# Patient Record
Sex: MALE | Race: WHITE | NOT HISPANIC OR LATINO | Employment: FULL TIME | URBAN - METROPOLITAN AREA
[De-identification: names, ages, dates, MRNs, and addresses within clinical notes are randomized per-mention and may not be internally consistent; named-entity substitution may affect disease eponyms.]

---

## 2023-09-13 ENCOUNTER — HOSPITAL ENCOUNTER (INPATIENT)
Dept: DATA CONVERSION | Facility: HOSPITAL | Age: 23
Discharge: HOME HEALTH | End: 2023-09-18
Attending: UROLOGY | Admitting: UROLOGY
Payer: COMMERCIAL

## 2023-09-13 DIAGNOSIS — D62 ACUTE POSTHEMORRHAGIC ANEMIA: ICD-10-CM

## 2023-09-13 DIAGNOSIS — J45.909 UNSPECIFIED ASTHMA, UNCOMPLICATED (HHS-HCC): ICD-10-CM

## 2023-09-13 DIAGNOSIS — T81.31XA DISRUPTION OF EXTERNAL OPERATION (SURGICAL) WOUND, NOT ELSEWHERE CLASSIFIED, INITIAL ENCOUNTER: ICD-10-CM

## 2023-09-13 DIAGNOSIS — F64.9 GENDER IDENTITY DISORDER, UNSPECIFIED: ICD-10-CM

## 2023-09-13 DIAGNOSIS — F64.0 TRANSSEXUALISM: ICD-10-CM

## 2023-09-14 LAB
ALBUMIN (G/DL) IN SER/PLAS: 3.6 G/DL (ref 3.4–5)
ANION GAP IN SER/PLAS: 12 MMOL/L (ref 10–20)
CALCIUM (MG/DL) IN SER/PLAS: 8.7 MG/DL (ref 8.6–10.6)
CARBON DIOXIDE, TOTAL (MMOL/L) IN SER/PLAS: 24 MMOL/L (ref 21–32)
CHLORIDE (MMOL/L) IN SER/PLAS: 105 MMOL/L (ref 98–107)
CREATININE (MG/DL) IN SER/PLAS: 0.74 MG/DL (ref 0.5–1.3)
ERYTHROCYTE DISTRIBUTION WIDTH (RATIO) BY AUTOMATED COUNT: 13.2 % (ref 11.5–14.5)
ERYTHROCYTE MEAN CORPUSCULAR HEMOGLOBIN CONCENTRATION (G/DL) BY AUTOMATED: 32.9 G/DL (ref 32–36)
ERYTHROCYTE MEAN CORPUSCULAR VOLUME (FL) BY AUTOMATED COUNT: 90 FL (ref 80–100)
ERYTHROCYTES (10*6/UL) IN BLOOD BY AUTOMATED COUNT: 4.17 X10E12/L (ref 4.5–5.9)
GFR MALE: >90 ML/MIN/1.73M2
GLUCOSE (MG/DL) IN SER/PLAS: 75 MG/DL (ref 74–99)
HEMATOCRIT (%) IN BLOOD BY AUTOMATED COUNT: 37.4 % (ref 41–52)
HEMOGLOBIN (G/DL) IN BLOOD: 12.3 G/DL (ref 13.5–17.5)
LEUKOCYTES (10*3/UL) IN BLOOD BY AUTOMATED COUNT: 12.7 X10E9/L (ref 4.4–11.3)
NRBC (PER 100 WBCS) BY AUTOMATED COUNT: 0 /100 WBC (ref 0–0)
PHOSPHATE (MG/DL) IN SER/PLAS: 3.8 MG/DL (ref 2.5–4.9)
PLATELETS (10*3/UL) IN BLOOD AUTOMATED COUNT: 196 X10E9/L (ref 150–450)
POTASSIUM (MMOL/L) IN SER/PLAS: 4.2 MMOL/L (ref 3.5–5.3)
SODIUM (MMOL/L) IN SER/PLAS: 137 MMOL/L (ref 136–145)
UREA NITROGEN (MG/DL) IN SER/PLAS: 11 MG/DL (ref 6–23)

## 2023-09-15 LAB
ALBUMIN (G/DL) IN SER/PLAS: 3.5 G/DL (ref 3.4–5)
ANION GAP IN SER/PLAS: 11 MMOL/L (ref 10–20)
CALCIUM (MG/DL) IN SER/PLAS: 8.6 MG/DL (ref 8.6–10.6)
CARBON DIOXIDE, TOTAL (MMOL/L) IN SER/PLAS: 27 MMOL/L (ref 21–32)
CHLORIDE (MMOL/L) IN SER/PLAS: 103 MMOL/L (ref 98–107)
CREATININE (MG/DL) IN SER/PLAS: 0.96 MG/DL (ref 0.5–1.3)
ERYTHROCYTE DISTRIBUTION WIDTH (RATIO) BY AUTOMATED COUNT: 13.1 % (ref 11.5–14.5)
ERYTHROCYTE MEAN CORPUSCULAR HEMOGLOBIN CONCENTRATION (G/DL) BY AUTOMATED: 32.3 G/DL (ref 32–36)
ERYTHROCYTE MEAN CORPUSCULAR VOLUME (FL) BY AUTOMATED COUNT: 93 FL (ref 80–100)
ERYTHROCYTES (10*6/UL) IN BLOOD BY AUTOMATED COUNT: 3.89 X10E12/L (ref 4.5–5.9)
GFR MALE: >90 ML/MIN/1.73M2
GLUCOSE (MG/DL) IN SER/PLAS: 83 MG/DL (ref 74–99)
HEMATOCRIT (%) IN BLOOD BY AUTOMATED COUNT: 36.2 % (ref 41–52)
HEMOGLOBIN (G/DL) IN BLOOD: 11.7 G/DL (ref 13.5–17.5)
LEUKOCYTES (10*3/UL) IN BLOOD BY AUTOMATED COUNT: 9.6 X10E9/L (ref 4.4–11.3)
NRBC (PER 100 WBCS) BY AUTOMATED COUNT: 0 /100 WBC (ref 0–0)
PHOSPHATE (MG/DL) IN SER/PLAS: 3.3 MG/DL (ref 2.5–4.9)
PLATELETS (10*3/UL) IN BLOOD AUTOMATED COUNT: 175 X10E9/L (ref 150–450)
POTASSIUM (MMOL/L) IN SER/PLAS: 4.2 MMOL/L (ref 3.5–5.3)
SODIUM (MMOL/L) IN SER/PLAS: 137 MMOL/L (ref 136–145)
UREA NITROGEN (MG/DL) IN SER/PLAS: 10 MG/DL (ref 6–23)

## 2023-09-16 LAB
ALBUMIN (G/DL) IN SER/PLAS: 3.4 G/DL (ref 3.4–5)
ANION GAP IN SER/PLAS: 12 MMOL/L (ref 10–20)
CALCIUM (MG/DL) IN SER/PLAS: 8.5 MG/DL (ref 8.6–10.6)
CARBON DIOXIDE, TOTAL (MMOL/L) IN SER/PLAS: 25 MMOL/L (ref 21–32)
CHLORIDE (MMOL/L) IN SER/PLAS: 103 MMOL/L (ref 98–107)
CREATININE (MG/DL) IN SER/PLAS: 0.75 MG/DL (ref 0.5–1.3)
ERYTHROCYTE DISTRIBUTION WIDTH (RATIO) BY AUTOMATED COUNT: 13.1 % (ref 11.5–14.5)
ERYTHROCYTE MEAN CORPUSCULAR HEMOGLOBIN CONCENTRATION (G/DL) BY AUTOMATED: 32.9 G/DL (ref 32–36)
ERYTHROCYTE MEAN CORPUSCULAR VOLUME (FL) BY AUTOMATED COUNT: 90 FL (ref 80–100)
ERYTHROCYTES (10*6/UL) IN BLOOD BY AUTOMATED COUNT: 4.04 X10E12/L (ref 4.5–5.9)
GFR MALE: >90 ML/MIN/1.73M2
GLUCOSE (MG/DL) IN SER/PLAS: 85 MG/DL (ref 74–99)
HEMATOCRIT (%) IN BLOOD BY AUTOMATED COUNT: 36.5 % (ref 41–52)
HEMOGLOBIN (G/DL) IN BLOOD: 12 G/DL (ref 13.5–17.5)
LEUKOCYTES (10*3/UL) IN BLOOD BY AUTOMATED COUNT: 8.6 X10E9/L (ref 4.4–11.3)
NRBC (PER 100 WBCS) BY AUTOMATED COUNT: 0 /100 WBC (ref 0–0)
PHOSPHATE (MG/DL) IN SER/PLAS: 2.8 MG/DL (ref 2.5–4.9)
PLATELETS (10*3/UL) IN BLOOD AUTOMATED COUNT: 180 X10E9/L (ref 150–450)
POTASSIUM (MMOL/L) IN SER/PLAS: 4.3 MMOL/L (ref 3.5–5.3)
SODIUM (MMOL/L) IN SER/PLAS: 136 MMOL/L (ref 136–145)
UREA NITROGEN (MG/DL) IN SER/PLAS: 10 MG/DL (ref 6–23)

## 2023-09-30 NOTE — PROGRESS NOTES
Service: Urology     Subjective Data:   ELLI REDMOND is a 23 year old Male who is Hospital Day # 5 and POD #4 for 1. ALT Phalloplasty Delayed;;2. Split Thickness Skin Graft Nerve Transfer/VAC;     NAEON. Penrose accidentally removed during dressing change yesterday. Sat in chair for 1hr yesterday.    Overnight Events: Patient had an uneventful night.     Objective Data:     Objective Information:        T   P  R  BP   MAP  SpO2   Value  37.1  67  16  115/71      97%  Date/Time 9/17 8:20 9/17 8:20 9/17 8:20 9/17 8:20    9/17 8:20  Range  (36.8C - 37.2C )  (61 - 73 )  (16 - 18 )  (107 - 120 )/ (66 - 76 )    (96% - 99% )  Highest temp of 37.2 C was recorded at 9/16 18:01         Intake                                   Output      Enteral - Oral         240 mL               Urine                  2400 mL   IV Fluids              300 mL    Physical Exam Narrative:  ·  Physical Exam:    General: Laying in bed. NAD.   Eyes: EOMI  ENMT: no apparent injury, no lesions seen, MMM  Head/neck: NCAT  Cardiac: regular rate in chart  Pulm: normal respiratory effort  GI: soft, NT/ND, no masses palpated  : Indwelling urethral catheter draining clear yellow urine. neophallus warm and without discoloration. ventral side covered in integra and xeroform  Msk: BOURNE  Extremities: normal extremities  Skin: warm, dry, no lesions noted  Neuro: AOx3  Psych: appropriate mood and behavior      Recent Lab Results:    Results:    CBC: 9/16/2023 06:39              \     Hgb     /                              \     12.0 L    /  WBC  ----------------  Plt               8.6       ----------------    180              /     Hct     \                              /     36.5 L    \            RBC: 4.04 L    MCV: 90           RFP: 9/16/2023 06:39  NA+        Cl-     BUN  /                         136    103    10  /  --------------------------------  Glucose                ---------------------------  85    K+     HCO3-   Creat \                          4.3    25    0.75  \  Calcium : 8.5 LAnion Gap : 12          Albumin : 3.4     Phos : 2.8      Assessment and Plan:   Daily Risk Screen:  ·  Does patient have an indwelling urinary catheter? yes   ·  Plan for indwelling urinary catheter removal today? yes   ·  Does patient have a central line? n/a consulting service     Code Status:  ·  Code Status Full Code     Assessment:    Beck Galdamez is a 23 year old Male who is presenting for a ALT phalloplasty w/left thigh flap with Inez Castillo and Francine (9/13). POst op course uncomplicated at this  time.      Plan:  - Reg diet  - pain control  - HLIV, no labs  - off bedrest  - IS 10x/hr  - Ancef  - SQH, SCDs  - perez in place  - appreciate co management by plastics    Seen and discussed with Chief resident Dr. Bourgeois.    Filipe Triana MD  Urology Resident (PGY-4)  Urology Adult [id number]  Urology Pediatric [id number]                 Attestation:   Note Completion:  I am a:  Resident/Fellow   Attending Attestation I reviewed the resident/fellow?s documentation and discussed the patient with the resident/fellow.  I agree with the resident/fellow?s medical  decision making as documented in the note.          Electronic Signatures:  Filipe Triana (Resident))  (Signed 17-Sep-2023 09:17)   Authored: Service, Subjective Data, Objective Data, Assessment  and Plan, Note Completion  Bladimir Castillo)  (Signed 19-Sep-2023 07:52)   Authored: Note Completion   Co-Signer: Service, Subjective Data, Objective Data, Assessment and Plan, Note Completion      Last Updated: 19-Sep-2023 07:52 by Bladimir Castillo)

## 2023-09-30 NOTE — PROGRESS NOTES
"        Service: Plastic Surgery     Subjective Data:   ELLI REDMOND is a 23 year old Male who is Hospital Day # 5 and POD #4 for 1. ALT Phalloplasty Delayed;;2. Split Thickness Skin Graft Nerve Transfer/VAC;     Pt sitting in bedside chair. Endorses nausea this AM relating to constipation and possible reflux from having to eat meals in bed while on bedrest. Concern overnight for urinary discomfort and feeling  of having to urinate but not being able to despite Perez catheter insertion. Urology has been paged to assess Perez. Patient has been able to get OOB without much issue but does not \"muscle pain\" to the LLE with dangling and extension. No fevers, chills,  SOB, CP.    Overnight Events: Patient had an uneventful night.     Objective Data:     Objective Information:      T   P  R  BP   MAP  SpO2   Value  37.1  67  16  115/71      97%  Date/Time 9/17 8:20 9/17 8:20 9/17 8:20 9/17 8:20    9/17 8:20  Range  (36.8C - 37.2C )  (61 - 73 )  (16 - 18 )  (107 - 120 )/ (66 - 76 )    (96% - 99% )  Highest temp of 37.2 C was recorded at 9/16 18:01      Pain reported at 9/16 8:04: 2 = Mild    ---- Intake and Output  -----  Mn/Dy/Year Time  Intake   Output  Net  Sep 17, 2023 6:00 am  0   450  -450  Sep 16, 2023 10:00 pm  0   650  -650  Sep 16, 2023 2:00 pm  540   1300  -760    The Intake and Output Totals for the last 24 hours are:      Intake   Output  Net      540   2400  -1860    Physical Exam by System:    Constitutional: A&Ox3, NAD, calm and cooperative.   Eyes: PERRL, EOMI, clear sclera.   ENMT: MMM.   Head/Neck: Normocephalic, atraumatic.   Respiratory/Thorax: Respirations even and unlabored.   Cardiovascular: RRR.   Gastrointestinal: Soft, non-tender, non-distended.   Genitourinary: Voiding per perez catheter in native  urethra with clear, yellow urine. Phallus in appropriate position, maintaining 45 degree angle with mesh panties/kerlix fluff for support. Phallus warm, soft and with appropriate coloring. Slightly " less edematous. Strong arterial doppler signals on R  lateral mid phallus and dorsal aspect of distal phallus. Suture line at base of phallus intact. Integra intact to the volar aspect of the phallus. S/p self removal of Penrose drain, scant bloody drainage to dressing at previous insertion site.   Extremities: LLE in ACE wrap with underlying wound  vac in place without alarm for leak or malfunction, holding appropriate suction at -125mmHg. RLE STSG donor site with overlying xeroform intact.   Neurological: Alert and oriented x3.   Psychological: Appropriate mood and behavior.   Skin: Warm and dry, no lesions, no rashes.     Medication:    Medications:          Continuous Medications       --------------------------------  No continuous medications are active       Scheduled Medications       --------------------------------    1. Acetaminophen:  650  mg  Oral  Every 6 Hours    2. Aspirin Chewable:  81  mg  Oral  Daily    3. ceFAZolin 2 gram/ D5W 100 mL Premix IVPB:  100  mL  IntraVenous Piggyback  Every 8 Hours    4. Docusate:  100  mg  Oral  2 Times a Day    5. Heparin SubCutaneous:  5000  unit(s)  SubCutaneous  Once    6. Heparin SubCutaneous:  5000  unit(s)  SubCutaneous  Every 8 Hours    7. Lidocaine 4% TransDermal:  2  patch  TransDermal  Every 24 Hours    8. Methocarbamol:  500  mg  Oral  4 Times a Day         PRN Medications       --------------------------------    1. Bisacodyl Enteric Coated:  5  mg  Oral  Once    2. HYDROmorphone Injectable:  0.2  mg  IntraVenous Push  Every 2 Hours    3. Ondansetron Injectable:  4  mg  IntraVenous Push  Every 6 Hours    4. oxyCODONE Immediate Release:  5  mg  Oral  Every 4 Hours    5. oxyCODONE Immediate Release:  10  mg  Oral  Every 4 Hours    6. Sodium Chloride 0.9% Injectable Flush:  10  mL  IntraVenous Flush  Every 8 Hours and as Needed        Assessment and Plan:   Daily Risk Screen:  ·  Does patient have an indwelling urinary catheter? yes   ·  Plan for indwelling  urinary catheter removal today? no   ·  The patient continues to require indwelling urinary catheterization for perioperative use for selected surgical procedures     Code Status:  ·  Code Status Full Code     Assessment:    ELLI REDMOND is a 23 year old male who is s/p delayed left ALT phalloplasty from left donor site, split thickness skin graft (right thigh donor site) Integra placement  and vac placement to left thigh ALT site on 9/13 with Urology and Plastic Surgery.      Plan/Recommendations:   S/p delayed left ALT phalloplasty and STSG   - Continue Q4hour phallus per nursing       · Assess temperature, turgor, color, incisions, positioning plus Doppler pulses along phallus shaft       · Please alert plastic surgery immediately with any acute changes or concerns including but not limited to pallor, coolness to touch, decreased or absent doppler pulse  - Maintain phallus at 45 degree angle with Kerlix fluffs   - Ok for warm compress to neophallus       · Ensure a barrier between neophallus and warm compress   - Maintain wound vac to LLE STSG site at -125mmHg low continuous suction       · Plan for removal of wound vac on POD5 (9/18) per plastic surgery APPs       · Monitor and record wound vac output      · Keep wound vac plugged in at all times while patient is resting in bed      · Notify plastic surgery DIONTE immediately with concerns of wound vac leak or malfunction  - Leave STSG donor site WOODY (s/p outer dressing removal POD#2)       · Leave residual Xeroform intact, dressing will dry out and self remove      · Okay to trim edges of Xeroform as it self removes   - OT consult for phallus support (to be completed Monday 9/18)   - Okay to continue regular diet  - Okay to be OOB from plastic surgery perspective       · Ensure neophallus is always supported and maintains postioning at 45degrees   - Crowe catheter per Urology  - Remainder of care per primary service   - Plastic surgery will continue to follow in  post operative period   - Follow up appointment with plastic surgery scheduled for 9/21     Patient and plan discussed with Dr. Francine Roque PA-C   Plastic and Reconstructive Surgery   Available via [email or web address], pager: [phone] or Team phones: [phone]     Time spent on the assessment of patient, gathering and interpreting data, review of medical record/patient history, personally reviewing radiographic imaging and formulation of this note 30 minutes. With greater than 50% spent in personal discussion with  patient.       Electronic Signatures:  Christine Roque (PAC)  (Signed 17-Sep-2023 09:56)   Authored: Service, Subjective Data, Objective Data, Assessment  and Plan, Note Completion      Last Updated: 17-Sep-2023 09:56 by Christine Roque (PAC)

## 2023-09-30 NOTE — H&P
History & Physical Reviewed:   I have reviewed the History and Physical dated:  13-Sep-2023   History and Physical reviewed and relevant findings noted. Patient examined to review pertinent physical  findings.: No significant changes   Home Medications Reviewed: no changes noted   Allergies Reviewed: no changes noted       ERAS (Enhanced Recovery After Surgery):  ·  ERAS Patient: no     Consent:   COVID-19 Consent:  ·  COVID-19 Risk Consent Surgeon has reviewed key risks related to the risk of eve COVID-19 and if they contract COVID-19 what the risks are.     Attestation:   Note Completion:  I am a:  Resident/Fellow   Attending Attestation I saw and evaluated the patient.  I personally obtained the key and critical portions of the history and physical exam or was physically present for key and  critical portions performed by the resident/fellow. I reviewed the resident/fellow?s documentation and discussed the patient with the resident/fellow.  I agree with the resident/fellow?s medical decision making as documented in the note.     I personally evaluated the patient on 13-Sep-2023         Electronic Signatures:  Juan Carlos Martinez (Resident))  (Signed 13-Sep-2023 05:02)   Authored: History & Physical Reviewed, ERAS, Consent,  Note Completion  Bladimir Castillo)  (Signed 13-Sep-2023 08:28)   Authored: Note Completion   Co-Signer: History & Physical Reviewed, ERAS, Consent, Note Completion      Last Updated: 13-Sep-2023 08:28 by Bladimir Castillo)

## 2023-09-30 NOTE — PROGRESS NOTES
Service: Urology     Subjective Data:   ELLI REDMOND is a 23 year old Male who is Hospital Day # 4 and POD #3 for 1. ALT Phalloplasty Delayed;;2. Split Thickness Skin Graft Nerve Transfer/VAC;     Patient doing well overnight.    Overnight Events: Patient had an uneventful night.     Objective Data:     Objective Information:      T   P  R  BP   MAP  SpO2   Value  36.8  73  16  120/76      96%  Date/Time 9/16 5:47 9/16 5:47 9/16 5:47 9/16 5:47    9/16 5:47  Range  (36.5C - 37C )  (72 - 91 )  (16 - 16 )  (107 - 120 )/ (61 - 76 )    (95% - 98% )  Highest temp of 37 C was recorded at 9/15 21:56      Pain reported at 9/15 21:40: 5 = Moderate    ---- Intake and Output  -----  Mn/Dy/Year Time  Intake   Output  Net  Sep 16, 2023 6:00 am  0   450  -450  Sep 15, 2023 10:00 pm  540   1400  -860  Sep 15, 2023 2:00 pm  960   1650  -690    The Intake and Output Totals for the last 24 hours are:      Intake   Output  Net      1500   3500  -2000    Physical Exam Narrative:  ·  Physical Exam:    General: Laying in bed. NAD.   Eyes: EOMI  ENMT: no apparent injury, no lesions seen, MMM  Head/neck: NCAT  Cardiac: regular rate in chart  Pulm: normal respiratory effort  GI: soft, NT/ND, no masses palpated  : Indwelling urethral catheter draining clear yellow urine. neophallus warm and without discoloration. ventral side covered in integra and xeroform  Msk: BOURNE  Extremities: normal extremities  Skin: warm, dry, no lesions noted  Neuro: AOx3  Psych: appropriate mood and behavior      Medication:    Medications:          Continuous Medications       --------------------------------    1. Sodium Chloride 0.9% Infusion:  1000  mL  IntraVenous  <Continuous>         Scheduled Medications       --------------------------------    1. Acetaminophen:  650  mg  Oral  Every 6 Hours    2. Aspirin Chewable:  81  mg  Oral  Daily    3. ceFAZolin 2 gram/ D5W 100 mL Premix IVPB:  100  mL  IntraVenous Piggyback  Every 8 Hours    4. Docusate:   100  mg  Oral  2 Times a Day    5. Heparin SubCutaneous:  5000  unit(s)  SubCutaneous  Once    6. Heparin SubCutaneous:  5000  unit(s)  SubCutaneous  Every 8 Hours    7. Lidocaine 4% TransDermal:  2  patch  TransDermal  Every 24 Hours    8. Methocarbamol:  500  mg  Oral  4 Times a Day         PRN Medications       --------------------------------    1. Bisacodyl Enteric Coated:  5  mg  Oral  Once    2. HYDROmorphone Injectable:  0.2  mg  IntraVenous Push  Every 2 Hours    3. Ondansetron Injectable:  4  mg  IntraVenous Push  Every 6 Hours    4. oxyCODONE Immediate Release:  5  mg  Oral  Every 4 Hours    5. oxyCODONE Immediate Release:  10  mg  Oral  Every 4 Hours    6. Sodium Chloride 0.9% Injectable Flush:  10  mL  IntraVenous Flush  Every 8 Hours and as Needed        Recent Lab Results:    Results:    CBC: 9/16/2023 06:39              \     Hgb     /                              \     12.0 L    /  WBC  ----------------  Plt               8.6       ----------------    180              /     Hct     \                              /     36.5 L    \            RBC: 4.04 L    MCV: 90           RFP: 9/16/2023 06:39  NA+        Cl-     BUN  /                         136    103    10  /  --------------------------------  Glucose                ---------------------------  85    K+     HCO3-   Creat \                         4.3    25    0.75  \  Calcium : 8.5 LAnion Gap : 12          Albumin : 3.4     Phos : 2.8      Assessment and Plan:   Daily Risk Screen:  ·  Does patient have an indwelling urinary catheter? yes   ·  Plan for indwelling urinary catheter removal today? yes   ·  Does patient have a central line? n/a consulting service     Code Status:  ·  Code Status Full Code     Assessment:    Beck Galdamez is a 23 year old Male who is presenting for a ALT phalloplasty w/left thigh flap with Inez Castillo and Francine yesterday.     Plan:  - Reg diet  - pain control  - HLIV, no labs  - off bedrest  - IS 10x/hr  -  Ancef  - SQH, SCDs  - perez in place  - appreciate co management by plastics    Seen and discussed with Chief resident Dr. Bourgeois.    Agustín Ward MD   Urology - PGY3  Pager [phone]                 Attestation:   Note Completion:  I am a:  Resident/Fellow   Attending Attestation I reviewed the resident/fellow?s documentation and discussed the patient with the resident/fellow.  I agree with the resident/fellow?s medical  decision making as documented in the note.          Electronic Signatures:  Agustín Wrad (Resident))  (Signed 16-Sep-2023 10:25)   Authored: Service, Subjective Data, Objective Data, Assessment  and Plan, Note Completion  Bladimir Castillo)  (Signed 19-Sep-2023 07:52)   Authored: Note Completion   Co-Signer: Service, Subjective Data, Objective Data, Assessment and Plan, Note Completion      Last Updated: 19-Sep-2023 07:52 by Bladimir Castillo)

## 2023-09-30 NOTE — PROGRESS NOTES
Service: Plastic Surgery     Subjective Data:   ELLI REDMOND is a 23 year old Male who is Hospital Day # 4 and POD #3 for 1. ALT Phalloplasty Delayed;;2. Split Thickness Skin Graft Nerve Transfer/VAC;     Pt resting comfortably in bed. Doing well this AM. No acute concerns but is slightly anxious about being OOB for first time. Pain well controlled. Tolerating diet with no N/V. No fevers, chills, SOB,  CP.    Overnight Events: Patient had an uneventful night.     Objective Data:     Objective Information:      T   P  R  BP   MAP  SpO2   Value  36.8  73  16  120/76      96%  Date/Time 9/16 5:47 9/16 5:47 9/16 5:47 9/16 5:47    9/16 5:47  Range  (36.5C - 37C )  (72 - 91 )  (16 - 16 )  (107 - 120 )/ (61 - 76 )    (95% - 98% )  Highest temp of 37 C was recorded at 9/15 21:56      Pain reported at 9/15 21:40: 5 = Moderate    Physical Exam by System:    Constitutional: A&Ox3, NAD, calm and cooperative.   Eyes: PERRL, EOMI, clear sclera.   ENMT: MMM.   Head/Neck: Normocephalic, atraumatic.   Respiratory/Thorax: Respirations even and unlabored.   Cardiovascular: RRR.   Gastrointestinal: Soft, non-tender, non-distended.   Genitourinary: Voiding per perez catheter in native  urethra with clear, yellow urine. Phallus in appropriate position, maintaining 45 degree angle with mesh panties/kerlix fluff for support. Phallus warm, soft and with appropriate coloring. Strong arterial doppler signals on R lateral mid phallus and dorsal  aspect of distal phallus. Suture line at base of phallus intact. Integra intact to the volar aspect of the phallus. Bloody drainage from penrose drain.   Extremities: LLE in ACE wrap with underlying wound  vac in place without alarm for leak or malfunction, holding appropriate suction at -125mmHg. RLE STSG donor site with overlying xeroform intact.   Neurological: Alert and oriented x3.   Psychological: Appropriate mood and behavior.   Skin: Warm and dry, no lesions, no rashes.      Medication:    Medications:          Continuous Medications       --------------------------------  No continuous medications are active       Scheduled Medications       --------------------------------    1. Acetaminophen:  650  mg  Oral  Every 6 Hours    2. Aspirin Chewable:  81  mg  Oral  Daily    3. ceFAZolin 2 gram/ D5W 100 mL Premix IVPB:  100  mL  IntraVenous Piggyback  Every 8 Hours    4. Docusate:  100  mg  Oral  2 Times a Day    5. Heparin SubCutaneous:  5000  unit(s)  SubCutaneous  Once    6. Heparin SubCutaneous:  5000  unit(s)  SubCutaneous  Every 8 Hours    7. Lidocaine 4% TransDermal:  2  patch  TransDermal  Every 24 Hours    8. Methocarbamol:  500  mg  Oral  4 Times a Day         PRN Medications       --------------------------------    1. Bisacodyl Enteric Coated:  5  mg  Oral  Once    2. HYDROmorphone Injectable:  0.2  mg  IntraVenous Push  Every 2 Hours    3. Ondansetron Injectable:  4  mg  IntraVenous Push  Every 6 Hours    4. oxyCODONE Immediate Release:  5  mg  Oral  Every 4 Hours    5. oxyCODONE Immediate Release:  10  mg  Oral  Every 4 Hours    6. Sodium Chloride 0.9% Injectable Flush:  10  mL  IntraVenous Flush  Every 8 Hours and as Needed        Recent Lab Results:    Results:    CBC: 9/16/2023 06:39              \     Hgb     /                              \     12.0 L    /  WBC  ----------------  Plt               8.6       ----------------    180              /     Hct     \                              /     36.5 L    \            RBC: 4.04 L    MCV: 90           RFP: 9/16/2023 06:39  NA+        Cl-     BUN  /                         136    103    10  /  --------------------------------  Glucose                ---------------------------  85    K+     HCO3-   Creat \                         4.3    25    0.75  \  Calcium : 8.5 LAnion Gap : 12          Albumin : 3.4     Phos : 2.8      Assessment and Plan:   Daily Risk Screen:  ·  Does patient have an indwelling urinary  catheter? yes   ·  Plan for indwelling urinary catheter removal today? no   ·  The patient continues to require indwelling urinary catheterization for perioperative use for selected surgical procedures     Code Status:  ·  Code Status Full Code     Assessment:    ELLI REDMOND is a 23 year old male who is s/p delayed left ALT phalloplasty from left donor site, split thickness skin graft (right thigh donor site) Integra placement  and vac placement to left thigh ALT site on 9/13 with Urology and Plastic Surgery.      Plan/Recommendations:   S/p delayed left ALT phalloplasty and STSG   - Continue Q4hour phallus per nursing       · Assess temperature, turgor, color, incisions, positioning plus Doppler pulses along phallus shaft       · Please alert plastic surgery immediately with any acute changes or concerns including but not limited to pallor, coolness to touch, decreased or absent doppler pulse  - Maintain phallus at 45 degree angle with Kerlix fluffs   - Ok for warm compress to neophallus       · Ensure a barrier between neophallus and warm compress   - Maintain wound vac to LLE STSG site at -125mmHg low continuous suction       · Plan for removal of wound vac on POD5 (9/18) per plastic surgery APPs       · Monitor and record wound vac output      · Keep wound vac plugged in at all times while patient is resting in bed      · Notify plastic surgery DIONTE immediately with concerns of wound vac leak or malfunction  - Leave STSG donor site WOODY (s/p outer dressing removal POD#2)       · Leave residual Xeroform intact, dressing will dry out and self remove      · Okay to trim edges of Xeroform as it self removes   - OT consult for phallus support   - Okay to continue regular diet  - Okay to be OOB from plastic surgery perspective       · Ensure neophallus is always supported and maintains postioning at 45degrees   - Crowe and penrose drain per Urology  - Remainder of care per primary service   - Plastic surgery will  continue to follow in post operative period   - Follow up appointment with plastic surgery scheduled for 9/21     Patient and plan discussed with Dr. Francine Roque PA-C   Plastic and Reconstructive Surgery   Available via [employer], pager: [phone] or Team phones: [phone]     Time spent on the assessment of patient, gathering and interpreting data, review of medical record/patient history, personally reviewing radiographic imaging and formulation of this note 30 minutes. With greater than 50% spent in personal discussion with  patient.       Electronic Signatures:  Christine Roque (PAC)  (Signed 16-Sep-2023 10:50)   Authored: Service, Subjective Data, Objective Data, Assessment  and Plan, Note Completion      Last Updated: 16-Sep-2023 10:50 by Christine Roque (PAC)

## 2023-09-30 NOTE — PROGRESS NOTES
Service: Urology     Subjective Data:   ELLI REDMOND is a 23 year old Male who is Hospital Day # 2 and POD #1 for 1. ALT Phalloplasty Delayed;;2. Split Thickness Skin Graft Nerve Transfer/VAC;     9/14. Patient doing well during AM rounds. His vitals signs are stable. Denies any nausea/vomiting.    Overnight Events: Patient had an uneventful night.     Objective Data:     Objective Information:      T   P  R  BP   MAP  SpO2   Value  36.4  86  17  103/53      98%  Date/Time 9/14 6:00 9/14 6:00 9/14 6:00 9/14 6:00    9/14 6:00  Range  (36.4C - 37C )  (71 - 86 )  (17 - 18 )  (103 - 120 )/ (53 - 76 )    (96% - 98% )  Highest temp of 37 C was recorded at 9/13 21:27      Pain reported at 9/14 5:00: 4 = Moderate    ---- Intake and Output  -----  Mn/Dy/Year Time  Intake   Output  Net  Sep 14, 2023 6:00 am  600   1100  -500  Sep 13, 2023 10:00 pm  2625   1805  820    The Intake and Output Totals for the last 24 hours are:      Intake   Output  Net      3225   2905  320      ---- Intake and Output  -----  Mn/Dy/Year Time  Intake   Output  Net  Sep 14, 2023 6:00 am  600   1100  -500  Sep 13, 2023 10:00 pm  2625   1805  820    The Intake and Output Totals for the last 24 hours are:      Intake   Output  Net      3225   2905  320    Physical Exam Narrative:  ·  Physical Exam:    General: Laying in bed. NAD.   Eyes: EOMI  ENMT: no apparent injury, no lesions seen, MMM  Head/neck: NCAT  Cardiac: regular rate in chart  Pulm: normal respiratory effort  GI: soft, NT/ND, no masses palpated  : Indwelling urethral catheter draining clear yellow urine   Msk: BOURNE  Extremities: normal extremities  Skin: warm, dry, no lesions noted  Neuro: AOx3  Psych: appropriate mood and behavior      Medication:    Medications:          Continuous Medications       --------------------------------    1. Sodium Chloride 0.9% Infusion:  1000  mL  IntraVenous  <Continuous>         Scheduled Medications       --------------------------------    1.  Acetaminophen:  650  mg  Oral  Every 6 Hours    2. Aspirin Chewable:  81  mg  Oral  Daily    3. ceFAZolin 2 gram/ D5W 100 mL Premix IVPB:  100  mL  IntraVenous Piggyback  Every 8 Hours    4. Docusate:  100  mg  Oral  2 Times a Day    5. Heparin SubCutaneous:  5000  unit(s)  SubCutaneous  Once    6. Heparin SubCutaneous:  5000  unit(s)  SubCutaneous  Every 8 Hours    7. Lidocaine 4% TransDermal:  2  patch  TransDermal  Every 24 Hours    8. Methocarbamol:  500  mg  Oral  4 Times a Day         PRN Medications       --------------------------------    1. Bisacodyl Enteric Coated:  5  mg  Oral  Once    2. HYDROmorphone Injectable:  0.2  mg  IntraVenous Push  Every 2 Hours    3. Ondansetron Injectable:  4  mg  IntraVenous Push  Every 6 Hours    4. oxyCODONE Immediate Release:  5  mg  Oral  Every 4 Hours    5. oxyCODONE Immediate Release:  10  mg  Oral  Every 4 Hours    6. Sodium Chloride 0.9% Injectable Flush:  10  mL  IntraVenous Flush  Every 8 Hours and as Needed        Recent Lab Results:    Results:        I have reviewed these laboratory results:    Complete Blood Count  14-Sep-2023 05:52:00      Result Value    White Blood Cell Count  12.7   H   Nucleated Erythrocyte Count  0.0    Red Blood Cell Count  4.17   L   HGB  12.3   L   HCT  37.4   L   MCV  90    MCHC  32.9    PLT  196    RDW-CV  13.2        Assessment and Plan:   Daily Risk Screen:  ·  Does patient have an indwelling urinary catheter? yes   ·  Plan for indwelling urinary catheter removal today? yes    ·  Does patient have a central line? n/a consulting service      Code Status:  ·  Code Status Full Code     Assessment:    Beck Galdamez is a 23 year old Male who is presenting for a ALT phalloplasty w/left thigh flap with Inez Castillo and Francine yesterday.     PMHx: Asthma, Anxiety ADD/ADHD  PSHx: Mastectomy, Hysterectomy  SH: Occasional alcohol, marijuana gummies  Fam Hx: Diabetes, CA  Alllergies: NKDA      9/14. Patient reports no issues during am rounds. His  vitals signs are stable. Denies any nausea/vomiting.    Plan:  - Keep patient NPO  - Continue bedrest  - F/U AM labs  - VAC dressing care  - Q1 hr flap checks   - Continue on antibiotics  - Appreciate  plastics co-management the patient  - Scheduled Tylenol, PRN Oxycodone for pain, PRN Dilaudid for breakthrough pa  - Maintain MAP >65  - Continue to monitor BP                                                                                                                                                                    - Encourage IS  - Maintain O2 >88%  - Bowel regimen  - PRN Zofran for nausea  - Daily BMP  - Strict I&Os  - Replete lytes PRN  - Trend CBC daily   - Transfusion not indicated at this time       Plan is preliminary until note is finalized.    Seen and discussed with Chief resident Dr. Rene.    Deny Ward MD  Urology Resident    [id number]                  Electronic Signatures:  Bladimir Castillo)  (Signed 14-Sep-2023 12:42)   Authored: Assessment and Plan, Note Completion   Co-Signer: Service, Subjective Data, Objective Data, Assessment and Plan  Deny Ward (ASST)  (Signed 14-Sep-2023 08:50)   Authored: Service, Subjective Data, Objective Data, Assessment  and Plan      Last Updated: 14-Sep-2023 12:42 by Bladimir Castillo)

## 2023-09-30 NOTE — PROGRESS NOTES
Service: Plastic Surgery     Subjective Data:   ELLI REDMOND is a 23 year old Male who is Hospital Day # 3 and POD #2 for 1. ALT Phalloplasty Delayed;;2. Split Thickness Skin Graft Nerve Transfer/VAC;     Pt resting comfortably in bed. Doing well this am. Pain well controlled. Tolerating diet with no N/V. No fevers, chills, SOB, CP.    Overnight Events: Patient had an uneventful night.     Objective Data:     Objective Information:      T   P  R  BP   MAP  SpO2   Value  36.7  75  16  107/61      95%  Date/Time 9/15 7:17 9/15 7:17 9/15 7:17 9/15 7:17    9/15 7:17  Range  (36.4C - 37.5C )  (71 - 86 )  (16 - 18 )  (103 - 112 )/ (51 - 61 )    (95% - 98% )  Highest temp of 37.5 C was recorded at 9/14 21:07      Pain reported at 9/15 9:53: 6 = Moderate    ---- Intake and Output  -----  Mn/Dy/Year Time  Intake   Output  Net  Sep 15, 2023 6:00 am  600   150  450  Sep 14, 2023 10:00 pm  300   650  -350  Sep 14, 2023 2:00 pm  0   1950  -1950    The Intake and Output Totals for the last 24 hours are:      Intake   Output  Net      900   2750  -1850    Physical Exam by System:    Constitutional: A&Ox3, NAD, calm and cooperative   Eyes: PERRL, EOMI, clear sclera   ENMT: MMM   Head/Neck: Normocephalic, atraumatic   Respiratory/Thorax: Respirations even and unlabored   Cardiovascular: RRR   Gastrointestinal: Soft, non-tender, non-distended   Genitourinary: Voiding per perez catheter in native  urethra with clear, yellow urine. Phallus in appropriate position, maintaining 45 degree angle with mesh panties/kerlix fluff for support. Phallus warm, soft and with appropriate coloring. Strong arterial doppler signals on R lateral mid phallus and dorsal  aspect of distal phallus. Suture line at base of phallus intact with scant bloody drainage. Integra intact to the volar aspect of the phallus. Bloody drainage from penrose drain.   Extremities: LLE in ACE wrap with underlying wound  vac in place without alarm for leak or  malfunction, holding appropriate suction at -125mmHg. RLE STSG donor site ACE wrap and ABD removed, xeroform intact overlying donor site with SS drainage.   Neurological: alert and oriented x3   Psychological: Appropriate mood and behavior   Skin: Warm and dry, no lesions, no rashes     Recent Lab Results:    Results:    CBC: 9/15/2023 06:55              \     Hgb     /                              \     11.7 L    /  WBC  ----------------  Plt               9.6       ----------------    175              /     Hct     \                              /     36.2 L    \            RBC: 3.89 L    MCV: 93           RFP: 9/15/2023 06:55  NA+        Cl-     BUN  /                         137    103    10  /  --------------------------------  Glucose                ---------------------------  83    K+     HCO3-   Creat \                         4.2    27    0.96  \  Calcium : 8.6Anion Gap : 11          Albumin : 3.5     Phos : 3.3      Assessment and Plan:   Daily Risk Screen:  ·  Does patient have an indwelling urinary catheter? yes   ·  Plan for indwelling urinary catheter removal today? no   ·  The patient continues to require indwelling urinary catheterization for perioperative use for selected surgical procedures     Code Status:  ·  Code Status Full Code     Assessment:    Assessment:  Mr. Galdamez is a 22yo male is who s/p delayed Left ALT phalloplasty from left donor site, Split thickness Skin graft (Right thigh donor site) and Integra placement, and vac placement to left thigh ALT site on 9/13/2023.     Doing well post-operatively. Pain well controlled. Phallus soft and warm with appropriate coloring, strong arterial doppler signals.     Plan:  - Phallus care:      · Doppler checks Q4h      · Maintain phallus at 45 degree angle with kerlix fluffs       · Assess temperature, turgor, color, incisions, positioning plus Doppler pulses along phallus shaft       · Please alert plastic surgery immediately with any  acute changes or concerns including but not limited to: pallor, coolness to touch,   decreased or absent doppler pulse      · Ok for warm compress to neophallus. Ensure a barrier between neophallus and warm compress.  - Wound vac care:      · Monitor and record wound vac output      · Keep wound vac plugged in at all times while patient is resting in bed      · Maintain low continuous suction at -125mmHg      · Notify plastic surgery DIONTE immediately with concerns of wound vac leak or malfunction      · Plan for removal of wound vac off on POD5 (9/18)  - STSG donor site care:       · Removed outer dressing on POD#2 (9/15), pt tolerated well. Xeroform intact with SS drainage      · Leave residual Xeroform intact and otherwise WOODY; Allow residual Xeroform to dry out and self remove  - OT consult for phallus support   - Regular diet  - IVF LR @75mL/hr, cont to decrease as PO intake increases  - Bedrest until POD3 (9/16), then OOB with assistance (ensure neophallus is always supported)  - Pain and nausea control per Urology  - Cont abx per Urology  - Crowe & penrose drain per Urology  - DVT ppx: ASA 81 mg QD, Heparin SQ, SCDs    Patient and plan discussed with Dr. Francine Cabrera PA-C   Plastic and Reconstructive Surgery   Available via [employer], pager: [phone] or Team phones: [phone]     Time spent on the assessment of patient, gathering and interpreting data, review of medical record/patient history, personally reviewing radiographic imaging and formulation of this note 30 minutes. With greater than 50% spent in personal discussion with  patient.        Electronic Signatures:  Vonda Cabrera (PAC)  (Signed 15-Sep-2023 12:52)   Authored: Service, Subjective Data, Objective Data, Assessment  and Plan, Note Completion      Last Updated: 15-Sep-2023 12:52 by Vonda Cabrera (PAC)

## 2023-09-30 NOTE — PROGRESS NOTES
Service: Urology     Subjective Data:   ELLI REDMOND is a 23 year old Male who is Hospital Day # 3 and POD #2 for 1. ALT Phalloplasty Delayed;;2. Split Thickness Skin Graft Nerve Transfer/VAC;     Patient doing well overnight. Still on bedrest. Denies fever, chills, nausea, vomiting.    Overnight Events: Patient had an uneventful night.     Objective Data:     Objective Information:      T   P  R  BP   MAP  SpO2   Value  36.7  75  16  107/61      95%  Date/Time 9/15 7:17 9/15 7:17 9/15 7:17 9/15 7:17    9/15 7:17  Range  (36.4C - 37.5C )  (71 - 86 )  (16 - 18 )  (103 - 112 )/ (51 - 61 )    (95% - 98% )  Highest temp of 37.5 C was recorded at 9/14 21:07      Pain reported at 9/15 0:09: 3 = Mild    ---- Intake and Output  -----  Mn/Dy/Year Time  Intake   Output  Net  Sep 15, 2023 6:00 am  600   150  450  Sep 14, 2023 10:00 pm  300   650  -350  Sep 14, 2023 2:00 pm  0   1950  -1950    The Intake and Output Totals for the last 24 hours are:      Intake   Output  Net      900   2750  -1850    Physical Exam Narrative:  ·  Physical Exam:    General: Laying in bed. NAD.   Eyes: EOMI  ENMT: no apparent injury, no lesions seen, MMM  Head/neck: NCAT  Cardiac: regular rate in chart  Pulm: normal respiratory effort  GI: soft, NT/ND, no masses palpated  : Indwelling urethral catheter draining clear yellow urine   Msk: BOURNE  Extremities: normal extremities  Skin: warm, dry, no lesions noted  Neuro: AOx3  Psych: appropriate mood and behavior      Recent Lab Results:    Results:    CBC: 9/15/2023 06:55              \     Hgb     /                              \     11.7 L    /  WBC  ----------------  Plt               9.6       ----------------    175              /     Hct     \                              /     36.2 L    \            RBC: 3.89 L    MCV: 93           RFP: 9/15/2023 06:55  NA+        Cl-     BUN  /                         137    103    10  /  --------------------------------  Glucose                 ---------------------------  83    K+     HCO3-   Creat \                         4.2    27    0.96  \  Calcium : 8.6Anion Gap : 11          Albumin : 3.5     Phos : 3.3      Assessment and Plan:   Daily Risk Screen:  ·  Does patient have an indwelling urinary catheter? yes   ·  Plan for indwelling urinary catheter removal today? yes   ·  Does patient have a central line? n/a consulting service     Code Status:  ·  Code Status Full Code     Assessment:    Beck Galdamez is a 23 year old Male who is presenting for a ALT phalloplasty w/left thigh flap with Inez Castillo and Francine yesterday.     PMHx: Asthma, Anxiety ADD/ADHD  PSHx: Mastectomy, Hysterectomy  SH: Occasional alcohol, marijuana gummies  Fam Hx: Diabetes, CA  Alllergies: NKDA    Plan:  - Reg diet  - Continue bedrest for 3 days post op  - F/U AM labs  - VAC dressing care  - Q4 hr flap checks   - Continue on antibiotics  - Appreciate  plastics co-management the patient  - Scheduled Tylenol, PRN Oxycodone for pain, PRN Dilaudid for breakthrough pa  - Maintain MAP >65  - Continue to monitor BP                                                                                                                                                                    - Encourage IS  - Maintain O2 >88%  - Bowel regimen  - PRN Zofran for nausea  - Daily BMP  - Strict I&Os  - Replete lytes PRN  - Trend CBC daily   - Transfusion not indicated at this time    Seen and discussed with Chief resident Dr. Rene.    Edil Chinchilla MD  Urology - PGY1  Pager [phone]                 Attestation:   Note Completion:  I am a:  Resident/Fellow   Attending Attestation I reviewed the resident/fellow?s documentation and discussed the patient with the resident/fellow.  I agree with the resident/fellow?s medical  decision making as documented in the note.          Electronic Signatures:  Bladimir Castillo)  (Signed 19-Sep-2023 07:51)   Authored: Note Completion   Co-Signer: Assessment and  Plan, Note Completion  Edil Chinchilla (Resident))  (Signed 15-Sep-2023 09:02)   Authored: Service, Subjective Data, Objective Data, Assessment  and Plan, Note Completion      Last Updated: 19-Sep-2023 07:51 by Bladimir Castillo)

## 2023-09-30 NOTE — PROGRESS NOTES
Service: Plastic Surgery     Subjective Data:   ELLI REDMOND is a 23 year old Male who is Hospital Day # 2 and POD #1 for 1. ALT Phalloplasty Delayed;;2. Split Thickness Skin Graft Nerve Transfer/VAC;     Pt resting comfortably in bed. States he is doing well this am, pain is well controlled. No fevers, chills, SOB, CP, N/V/D.    Overnight Events: Patient had an uneventful night.     Objective Data:     Objective Information:      T   P  R  BP   MAP  SpO2   Value  36.4  86  17  103/53      98%  Date/Time 9/14 6:00 9/14 6:00 9/14 6:00 9/14 6:00    9/14 6:00  Range  (36.4C - 37C )  (71 - 86 )  (17 - 18 )  (103 - 120 )/ (53 - 76 )    (96% - 98% )  Highest temp of 37 C was recorded at 9/13 21:27      Pain reported at 9/14 5:00: 4 = Moderate    ---- Intake and Output  -----  Mn/Dy/Year Time  Intake   Output  Net  Sep 14, 2023 6:00 am  600   1100  -500  Sep 13, 2023 10:00 pm  2625   1805  820    The Intake and Output Totals for the last 24 hours are:      Intake   Output  Net      7112   2905  320    Physical Exam by System:    Constitutional: A&Ox3, NAD, calm and cooperative   Eyes: PERRL, EOMI, clear sclera   ENMT: MMM   Head/Neck: Normocephalic, atraumatic   Respiratory/Thorax: Respirations even and unlabored   Cardiovascular: RRR   Gastrointestinal: Soft, non-tender, non-distended   Genitourinary: Voiding per perez catheter in native  urethra with clear, yellow urine. Phallus in appropriate position, maintaining 45 degree angle with mesh panties/kerlix fluff for support. Phallus warm, soft and with appropriate coloring. Strong arterial doppler signals on R lateral mid phallus and dorsal  aspect of distal phallus. Suture line at base of phallus intact with scant bloody drainage. Integra intact to the volar aspect of the phallus. Bloody drainage from penrose drain.   Extremities: LLE in ACE wrap with underlying wound  vac in place without alarm for leak or malfunction, holding appropriate suction at -125mmHg.  RLE STSG donor site wrapped in kerlix without signs of bleeding or strikethrough.   Neurological: alert and oriented x3   Psychological: Appropriate mood and behavior   Skin: Warm and dry, no lesions, no rashes     Medication:    Medications:          Continuous Medications       --------------------------------    1. Sodium Chloride 0.9% Infusion:  1000  mL  IntraVenous  <Continuous>         Scheduled Medications       --------------------------------    1. Acetaminophen:  650  mg  Oral  Every 6 Hours    2. Aspirin Chewable:  81  mg  Oral  Daily    3. ceFAZolin 2 gram/ D5W 100 mL Premix IVPB:  100  mL  IntraVenous Piggyback  Every 8 Hours    4. Docusate:  100  mg  Oral  2 Times a Day    5. Heparin SubCutaneous:  5000  unit(s)  SubCutaneous  Once    6. Heparin SubCutaneous:  5000  unit(s)  SubCutaneous  Every 8 Hours    7. Lidocaine 4% TransDermal:  2  patch  TransDermal  Every 24 Hours    8. Methocarbamol:  500  mg  Oral  4 Times a Day         PRN Medications       --------------------------------    1. Bisacodyl Enteric Coated:  5  mg  Oral  Once    2. HYDROmorphone Injectable:  0.2  mg  IntraVenous Push  Every 2 Hours    3. Ondansetron Injectable:  4  mg  IntraVenous Push  Every 6 Hours    4. oxyCODONE Immediate Release:  5  mg  Oral  Every 4 Hours    5. oxyCODONE Immediate Release:  10  mg  Oral  Every 4 Hours    6. Sodium Chloride 0.9% Injectable Flush:  10  mL  IntraVenous Flush  Every 8 Hours and as Needed        Recent Lab Results:    Results:    CBC: 9/14/2023 05:52              \     Hgb     /                              \     12.3 L    /  WBC  ----------------  Plt               12.7 H    ----------------    196              /     Hct     \                              /     37.4 L    \            RBC: 4.17 L    MCV: 90           RFP: 9/14/2023 05:52  NA+        Cl-     BUN  /                         137    105    11  /  --------------------------------  Glucose                 ---------------------------  75    K+     HCO3-   Creat \                         4.2    24    0.74  \  Calcium : 8.7Anion Gap : 12          Albumin : 3.6     Phos : 3.8      Assessment and Plan:   Daily Risk Screen:  ·  Does patient have an indwelling urinary catheter? yes   ·  Plan for indwelling urinary catheter removal today? no    ·  The patient continues to require indwelling urinary catheterization for perioperative use for selected surgical procedures     Code Status:  ·  Code Status Full Code     Assessment:    Assessment:  Mr. Galdamez is a 24yo male is who s/p delayed Left ALT phalloplasty from left donor site, Split thickness Skin graft (Right thigh donor site) and Integra placement, and vac placement to left thigh ALT site on 9/13/2023.     Doing well post-operatively. Pain well controlled. Phallus soft and warm with appropriate coloring, strong arterial doppler signals.     Plan:  - Phallus care:      > Doppler checks Q1 hour x 24hrs      > Maintain phallus at 45 degree angle with kerlix fluffs       > Assess temperature, turgor, color, incisions, positioning plus Doppler pulses along phallus shaft       > Please alert plastic surgery immediately with any acute changes or concerns including but not limited to: pallor, coolness to touch,  decreased or absent doppler pulse      > Ok for warm compress to neophallus. Ensure a barrier between neophallus and warm compress.  - Wound vac care:      > Monitor and record wound vac output      > Keep wound vac plugged in at all times while patient is resting in bed; maintain suction at 125mmHg      > Notify plastic surgery DIONTE immediately with concerns of wound vac leak or malfunction      > Plan for removal of wound vac off on POD5 (9/18)  - STSG donor site care:       > Plastics team will remove ace wrap on POD #2 (Friday 9/15)      > Notify plastics team of any drainage or strikethrough bleeding, reinforce PRN      > Upon external dressing removal, leave  residual Xeroform intact and otherwise WOODY; Allow residual Xeroform to dry out and self remove over time   - OT consult for phallus support   - OK for regular diet today   - IVF LR @100mL/hr while NPO, ok to decrease once tolerating diet  - Bedrest until POD3, then OOB with assistance (ensure neophallus is always supported)  - Pain and nausea control per Urology  - Cont abx per Urology  - Crowe & penrose drain per Urology  - DVT ppx: ASA 81 mg QD, Heparin SQ, SCDs    Patient and plan discussed with Dr. Francine Cabrera PA-C   Plastic and Reconstructive Surgery   Available via [employer], pager: [phone] or Team phones: [phone]     Time spent on the assessment of patient, gathering and interpreting data, review of medical record/patient history, personally reviewing radiographic imaging and formulation of this note 30 minutes. With greater than 50% spent in personal discussion with  patient.        Electronic Signatures:  oVnda Cabrera (PAC)  (Signed 14-Sep-2023 16:07)   Authored: Service, Subjective Data, Objective Data, Assessment  and Plan, Note Completion      Last Updated: 14-Sep-2023 16:07 by Vonda Cabrera (PAC)

## 2023-09-30 NOTE — PROGRESS NOTES
Service: Plastic Surgery     Subjective Data:   ELLI REDMOND is a 23 year old Male who is Hospital Day # 6 and POD #5 for 1. ALT Phalloplasty Delayed;;2. Split Thickness Skin Graft Nerve Transfer/VAC;     Pt awake resting in bed. Pain overall controlled. Continues to be OOB and doing well. Perez DC'd yesterday without issue, voiding independently. Anxious to have wound vac removed. No fevers, chills,  SOB, CP.    Overnight Events: Patient had an uneventful night.     Objective Data:     Objective Information:      T   P  R  BP   MAP  SpO2   Value  36.3  72  16  125/67      98%  Date/Time 9/18 5:38 9/18 5:38 9/18 5:38 9/18 5:38    9/18 5:38  Range  (36.3C - 37.3C )  (67 - 87 )  (16 - 16 )  (114 - 130 )/ (66 - 87 )    (97% - 100% )  Highest temp of 37.3 C was recorded at 9/17 22:16      Pain reported at 9/18 5:38: 3 = Mild    ---- Intake and Output  -----  Mn/Dy/Year Time  Intake   Output  Net  Sep 18, 2023 6:00 am  200   500  -300  Sep 17, 2023 10:00 pm  120   950  -830  Sep 17, 2023 2:00 pm  580   300  280    The Intake and Output Totals for the last 24 hours are:      Intake   Output  Net      900   1750  -850    Physical Exam by System:    Constitutional: A&Ox3, NAD, calm and cooperative.   Eyes: PERRL, EOMI, clear sclera.   ENMT: MMM.   Head/Neck: Normocephalic, atraumatic.   Respiratory/Thorax: Respirations even and unlabored.   Cardiovascular: RRR.   Gastrointestinal: Soft, non-tender, non-distended.   Genitourinary: S/p perez removal. Phallus in appropriate  position, maintaining 45 degree angle with mesh panties/kerlix fluff for support. Phallus warm, soft and with appropriate coloring. Slightly less edematous. Strong arterial doppler signals on R lateral mid phallus and dorsal aspect of distal phallus.  Suture line at base of phallus intact. Integra intact to the volar aspect of the phallus. S/p self removal of Penrose drain, scant bloody drainage to dressing at previous insertion site.    Extremities: Wound vac removed . RLE STSG donor site  with overlying xeroform intact. Skin graft recipient site intact with small area of shearing of graft in top left lateral corner, otherwise well healing appearing with minimal bloody oozing. Small region of numbness inferior to STSG (baseline from previous  surgical intervention per patient).   Neurological: Alert and oriented x3.   Psychological: Appropriate mood and behavior.   Skin: Warm and dry, no lesions, no rashes.     Medication:    Medications:          Continuous Medications       --------------------------------  No continuous medications are active       Scheduled Medications       --------------------------------    1. Acetaminophen:  650  mg  Oral  Every 6 Hours    2. Aspirin Chewable:  81  mg  Oral  Daily    3. Bacitracin 500 Units/gram Topical:  1  application(s)  Topical  Daily    4. ceFAZolin 2 gram/ D5W 100 mL Premix IVPB:  100  mL  IntraVenous Piggyback  Every 8 Hours    5. Docusate:  100  mg  Oral  2 Times a Day    6. Heparin SubCutaneous:  5000  unit(s)  SubCutaneous  Once    7. Heparin SubCutaneous:  5000  unit(s)  SubCutaneous  Every 8 Hours    8. Lidocaine 4% TransDermal:  2  patch  TransDermal  Every 24 Hours    9. Methocarbamol:  500  mg  Oral  4 Times a Day         PRN Medications       --------------------------------    1. Bisacodyl Enteric Coated:  5  mg  Oral  Once    2. Calcium Carbonate Chewable:  1000  mg  Oral  Every 2 Hours    3. HYDROmorphone Injectable:  0.2  mg  IntraVenous Push  Every 2 Hours    4. Ondansetron Injectable:  4  mg  IntraVenous Push  Every 6 Hours    5. Oxybutynin:  5  mg  Oral  3 Times a Day    6. oxyCODONE Immediate Release:  5  mg  Oral  Every 4 Hours    7. oxyCODONE Immediate Release:  10  mg  Oral  Every 4 Hours    8. Sodium Chloride 0.9% Injectable Flush:  10  mL  IntraVenous Flush  Every 8 Hours and as Needed        Assessment and Plan:   Daily Risk Screen:  ·  Does patient have an indwelling urinary  catheter? yes   ·  Plan for indwelling urinary catheter removal today? no   ·  The patient continues to require indwelling urinary catheterization for perioperative use for selected surgical procedures     Code Status:  ·  Code Status Full Code     Assessment:    ELLI REDMOND is a 23 year old male who is s/p delayed left ALT phalloplasty from left donor site, split thickness skin graft (right thigh donor site) Integra placement  and vac placement to left thigh ALT site on 9/13 with Urology and Plastic Surgery.      Plan/Recommendations:   S/p delayed left ALT phalloplasty and STSG   - Continue Q4hour phallus per nursing       · Assess temperature, turgor, color, incisions, positioning plus Doppler pulses along phallus shaft       · Please alert plastic surgery immediately with any acute changes or concerns including but not limited to pallor, coolness to touch, decreased or absent doppler pulse  - Maintain phallus at 45 degree angle with Kerlix fluffs  - Ok for warm compress to neophallus       · Ensure a barrier between neophallus and warm compress   - Wound vac to STSG on LLE removed at bedside per plastic surgery APPs without complication        · Pt to dress site with bacitracin, xeroform, ABD pad and secure with Kerlix daily   - Leave STSG donor site WOODY (s/p outer dressing removal POD#2)       · Leave residual Xeroform intact, dressing will dry out and self remove      · Okay to trim edges of Xeroform as it self removes   - OT consult for phallus support (to be completed Monday 9/18)   - Okay to continue regular diet  - Okay to be OOB from plastic surgery perspective       · Ensure neophallus is always supported and maintains postioning at 45degrees   - S/p removal of Crowe catheter per urology 9/17  - Remainder of care per primary service   - Okay for discharge from plastic surgery perspective once medically stable  - Plastic surgery will continue to follow in post operative period   - Follow up appointment  with plastic surgery scheduled for 9/21     Patient and plan discussed with Dr. Francine Roque PAMeghaC   Plastic and Reconstructive Surgery   Available via [employer], pager: [phone] or Team phones: [phone]     Time spent on the assessment of patient, gathering and interpreting data, review of medical record/patient history, personally reviewing radiographic imaging and formulation of this note 30 minutes. With greater than 50% spent in personal discussion with  patient.       Electronic Signatures:  Christine Roque (PAC)  (Signed 18-Sep-2023 08:15)   Authored: Service, Subjective Data, Objective Data, Assessment  and Plan, Note Completion      Last Updated: 18-Sep-2023 08:15 by Christine Roque (PAC)

## 2023-10-01 NOTE — OP NOTE
PROCEDURE DETAILS    Preoperative Diagnosis:  Gender identity disorder, unspecified, F64.9      Postoperative Diagnosis:  Gender Dysphoria  Surgeon: Alexander Escamilla  Resident/Fellow/Other Assistant: Bharti Giles    Procedure:  1.  Inset of previously delayed left ALT phalloplasty-this required increased procedural time and service due to significant adherence and scarring of ADM and previous flap  2.  Preparation of left wound bed for skin grafting, total 11 x 15 cm  3.  Split-thickness skin graft, 11 x 15 cm  4.  Placement of VAC greater than 50 cm  5.  Intraoperative ICG spy angiography for flap perfusion assessment  6.  Placement of Integra skin substitute     Anesthesia: Cosic, Ranjit  Estimated Blood Loss: 50  Findings: ~13cm Left ALT flap, doppler signals heard at the base and middle of the flap, Good perfusion of the flap using SPY. Doppler signals at the distal shaft at the end of the case.    Specimens(s) Collected: no,     Complications: None  Urine Output: 700  Drains and/or Catheters: perez catheter and Left thigh wound vac  Additional Details:     No qualified resident was available and Bharti Giles served as my full and primary assistant during the entire case.    Patient Returned To/Condition: PACU in a stable condition         Operative Report:     INDICATION  Damian is a 23-year-old transmale, who has met all WPATH criteria, previously we attempted ALT phalloplasty, but noted suboptimal perforators and perform surgical delay with placement of ADM.  He returns today for inset and split-thickness skin graft.  The patient is indicated for the above-stated procedure.       INFORMED CONSENT  Patient was told the risks, benefits, INDICATIONS, contraindications, and alternatives to the procedure. Risks include but not limited to pain, infection, bleeding, hematoma, seroma, injury to neurovascular and tendinous structures, bulky flap, delayed  closure of flap required, partial flap loss, insensate phallus  requiring second stage reconstruction, and need for subsequent surgeries.     The patient demonstrated understanding of these risks and agreed to proceed with surgery.   Advance directives discussed.  Team approach explained.      The patient consented and wished to proceed with the procedure and for medical photography if needed.     PROCEDURAL PAUSE  Prior to the beginning of the procedure, the patient's correct identity, side, site, and procedure to be performed were verified.  The patient was given intravenous antibiotics prior to skin incision.     PROCEDURAL NOTE  Damian  was brought to the operative theater and was transferred operating room table.  All bony prominences were well padded, and sequential compression devices were placed to each lower extremity. Patient was then secured with safety straps.    The patient was then placed under IV sedation, and our anesthesia colleagues administered general endotracheal anesthesia.    We began by marking the measurements of the previous ALT phalloplasty, we decrease the width of the phalloplasty approximately 2 cm on the lateral aspect.  We then reopened all incisions medially distally and made the new lateral incision.  We dissected  down medially and encountered a significant and exorbitant amount of scarring which is significantly delayed the procedure requiring increased procedural time and service.  We dissected meticulously to identify the previously placed acellular dermal matrix,  we dissected this distally and identified the 2 perforators which were previously marked and wrapped with ADM.  We then dissected laterally and inferiorly in order to free the flap, we performed the epithelialization of the left leg proximally to capture  venous perforators.  We reopened the medial incision and dissected free the flap as far as we could go medially, we then dissected deep to the rectus for Clayton muscle, following the descending branch of the lateral circumflex  femoral artery as far medially  as its takeoff from the profunda.  We then dissected circumferentially around the rectus femoris and delivered the flap under the rectus femoris.  We then dissected circumferentially around the sartorius muscle to create a connection to the pubic area.   At the pubis we created a circular incision in order to inset the flap.  From the pubic we dissected subcutaneously laterally until we were able to identify the previous pocket dissected from beneath the rectus and sartorius.  We carefully protected  the femoral arteries, thus created a pocket beneath the rectus femoris, sartorius and subcutaneous skin to deliver the phallus to the pubic area.  We then performed ICG angiography of the phallus before rolling it, proving excellent perfusion.  After this was completed we delivered the phallus into the pubic area, there was noted to be significant stricture upon the pedicle, pedicle was released  from all lateral connections to allow for more medialization.  We then incised and connected the pubic incision to the thigh dissection incision in order to release tension, we excised a portion of the sartorius muscle in order to create a trench to prevent  kinking on the pedicle.  After this was completed we dopplered the pedicle and confirmed Doppler inflow.      We then performed limited debulking of the flap, we attempted to roll the flap and close the flap but were unsure that the Lausier could be performed safely without compromising blood flow, and the decision was made to loosely close the phallus and place  Integra monolayer over the ventrum.  We did so with spanning 2-0 PDS suture, and we inset an Integra bilayer matrix over the ventral portion of the phallus.  After this we began insetting the phallus, tacking the proximal dermal flag subdermally using circumferential interrupted simple 2-0 deep dermal suture.  This was followed by interrupted simple 3-0 Monocryl suture in the skin.   A Penrose drain  was left exiting on the inferior lateral portion of the closure.    We then turned our attention to closing the donor site.  We performed quadricepsplasty to close the interval between the rectus femoris and the vastus lateralis with a running 3-0 STRATAFIX suture.  We then performed closure of the proximal medial  extension incision using running 3-0 strata fix and 2-0 PDS, followed by subcuticular 3-0 STRATAFIX suture.  We then performed circumferential imbricating/pursestring suture of the donor site to decrease the size from 14 x 16 to approximately  11 x 15 cm.  We then performed preparation of the wound bed in anticipation of skin grafting.  We turned attention to the contralateral thigh and obtained a split-thickness skin graft, 2 strips were taken, with a 3 inch guard, set to 1/12,000 of an inch, these were then meshed 3: 1.  We then customized the mesh skin graft to the left thigh donor  site with running 4-0 plain gut suture.  Over the right donor site we placed Kerecis omega-3 surgery close meshed 2: 1.  This was followed by Xeroform which was stapled to the skin, Telfa, ABD, Ace bandage.  A VAC bolster was placed over the donor site on the left thigh and set to 125 mmHg covered by an ABD and Ace bandage.  Bulky dressing was in place circumferentially around the phallus, Doppler was identified and mesh panties were placed with a hole cut  out for the phallus.    Total aspirin was placed 325 mg.    The patient tolerated the procedure well and was awakened from anesthesia without any difficulties, was transferred to the patient in stable condition, all needle counts were correct.    POST OP PLAN:  Patient will remain inpatient.  Patient will remain on bedrest for 3 days.  We will perform flap checks every for the next 48 hours followed by  every 2 for 48 hours thereafter.                        Attestation:   Note Completion:  Attending Attestation I was present for the entire procedure           Electronic Signatures:  Alexander Escamilla)  (Signed 14-Sep-2023 09:20)   Authored: Post-Operative Note, Chart Review, Note Completion   Co-Signer: Post-Operative Note, Chart Review  Bharti Giles (PA (PHYSICIAN))  (Signed 13-Sep-2023 18:11)   Authored: Post-Operative Note, Chart Review      Last Updated: 14-Sep-2023 09:20 by Alexander Escamilla)

## 2023-10-01 NOTE — OP NOTE
PROCEDURE DETAILS    Preoperative Diagnosis:  Gender Dysphoria  Postoperative Diagnosis:  Gender Dysphoria  Surgeon: Bladimir Castillo; Alexander Escamilla   Resident/Fellow/Other Assistant:  Juan Carlos Martinez      Procedure:  ALT Phalloplasty (Left thigh donor site)   Split thickness Skin graft (Right thigh donor site) and Integra placement  Anesthesia: General  Estimated Blood Loss: 50  Findings: ~13cm Left ALT flap, doppler signals heard at the base and middle of the flap, Good perfusion of the flap using SPY. Doppler signals at the distal shaft at the end of the case.    Specimens(s) Collected: no,     Complications: None  Urine Output: 700  Drains and/or Catheters: perez catheter and Left thigh wound vac  Patient Returned To/Condition: PACU in a stable condition                                 Attestation:   Note Completion:  Attending Attestation I was present for the entire procedure    I am a: Resident/Fellow         Electronic Signatures:  Juan Carlos Martinez (Resident))  (Signed 13-Sep-2023 16:28)   Authored: Post-Operative Note, Chart Review, Note Completion  Bladimir Castillo)  (Signed 14-Sep-2023 12:40)   Authored: Post-Operative Note, Chart Review, Note Completion   Co-Signer: Post-Operative Note, Chart Review, Note Completion      Last Updated: 14-Sep-2023 12:40 by Bladimir Castillo)

## 2023-10-09 ENCOUNTER — TELEPHONE (OUTPATIENT)
Dept: UROLOGY | Facility: CLINIC | Age: 23
End: 2023-10-09
Payer: COMMERCIAL

## 2023-10-09 DIAGNOSIS — G89.18 POST-OP PAIN: ICD-10-CM

## 2023-10-09 RX ORDER — GABAPENTIN 100 MG/1
100 CAPSULE ORAL 2 TIMES DAILY
Qty: 28 CAPSULE | Refills: 0 | Status: SHIPPED | OUTPATIENT
Start: 2023-10-09 | End: 2023-11-13 | Stop reason: HOSPADM

## 2023-10-31 RX ORDER — TESTOSTERONE CYPIONATE 1000 MG/10ML
0.6 INJECTION, SOLUTION INTRAMUSCULAR
COMMUNITY
Start: 2023-09-05

## 2023-10-31 NOTE — PREPROCEDURE INSTRUCTIONS
Current Medications   Medication Instructions    testosterone cypionate (Depo-Testosterone) 100 mg/mL injection As Directed                   NPO Instructions:    Do not eat any food after midnight the night before your surgery/procedure.    Additional Instructions:     Review your medication instructions, take indicated medications    Arrive in registration at 6:00am for 7:30am surgery    Enter through main entrance of [facility] hospital building, located at [address]. Proceed to registration, located in the back right hand corner. You will need your ID and insurance card for registration. Please ensure you have a responsible adult to drive you home.     Shower the morning of or night before your procedure. After you shower avoid lotions, powders, deodorants or anything applied to the skin. If you wear contacts or glasses, wear the glasses. If you do not have glasses, please bring a case for your contacts. You may wear hearing aids and dentures, bring a case for them or we will provide one. Make sure you wear something loose and comfortable. Keep in mind your surgery type and wear something that will accommodate incisions or bandages. Please remove all jewelry.     Nothing to eat or drink after midnight (including coffee, tea, gum etc). You may take medications discussed during phone call with a small sip of water.    For further questions Brenda MANN can be contacted at [phone] between 7AM-3PM.

## 2023-11-01 ENCOUNTER — ANESTHESIA (OUTPATIENT)
Dept: OPERATING ROOM | Facility: HOSPITAL | Age: 23
DRG: 876 | End: 2023-11-01
Payer: COMMERCIAL

## 2023-11-01 ENCOUNTER — ANESTHESIA EVENT (OUTPATIENT)
Dept: OPERATING ROOM | Facility: HOSPITAL | Age: 23
DRG: 876 | End: 2023-11-01
Payer: COMMERCIAL

## 2023-11-01 ENCOUNTER — HOSPITAL ENCOUNTER (OUTPATIENT)
Facility: HOSPITAL | Age: 23
Discharge: HOME | DRG: 876 | End: 2023-11-02
Attending: UROLOGY | Admitting: UROLOGY
Payer: COMMERCIAL

## 2023-11-01 DIAGNOSIS — S31.501A UNSPECIFIED OPEN WOUND OF UNSPECIFIED EXTERNAL GENITAL ORGANS, MALE, INITIAL ENCOUNTER: ICD-10-CM

## 2023-11-01 DIAGNOSIS — G89.18 ACUTE POSTOPERATIVE PAIN: ICD-10-CM

## 2023-11-01 DIAGNOSIS — F64.9 GENDER DYSPHORIA: Primary | ICD-10-CM

## 2023-11-01 LAB
ANION GAP SERPL CALC-SCNC: 10 MMOL/L (ref 10–20)
BUN SERPL-MCNC: 19 MG/DL (ref 6–23)
CALCIUM SERPL-MCNC: 9.1 MG/DL (ref 8.6–10.3)
CHLORIDE SERPL-SCNC: 105 MMOL/L (ref 98–107)
CO2 SERPL-SCNC: 28 MMOL/L (ref 21–32)
CREAT SERPL-MCNC: 0.68 MG/DL (ref 0.5–1.3)
ERYTHROCYTE [DISTWIDTH] IN BLOOD BY AUTOMATED COUNT: 12.7 % (ref 11.5–14.5)
GFR SERPL CREATININE-BSD FRML MDRD: >90 ML/MIN/1.73M*2
GLUCOSE SERPL-MCNC: 90 MG/DL (ref 74–99)
HCT VFR BLD AUTO: 43.9 % (ref 36–52)
HGB BLD-MCNC: 14.4 G/DL (ref 12–17.5)
MCH RBC QN AUTO: 30.1 PG (ref 26–34)
MCHC RBC AUTO-ENTMCNC: 32.8 G/DL (ref 32–36)
MCV RBC AUTO: 92 FL (ref 80–100)
NRBC BLD-RTO: 0 /100 WBCS (ref 0–0)
PLATELET # BLD AUTO: 291 X10*3/UL (ref 150–450)
PMV BLD AUTO: 9.8 FL (ref 7.5–11.5)
POTASSIUM SERPL-SCNC: 4.2 MMOL/L (ref 3.5–5.3)
RBC # BLD AUTO: 4.79 X10*6/UL (ref 4–5.9)
SODIUM SERPL-SCNC: 139 MMOL/L (ref 136–145)
WBC # BLD AUTO: 6.3 X10*3/UL (ref 4.4–11.3)

## 2023-11-01 PROCEDURE — 2500000004 HC RX 250 GENERAL PHARMACY W/ HCPCS (ALT 636 FOR OP/ED): Performed by: NURSE PRACTITIONER

## 2023-11-01 PROCEDURE — 2500000004 HC RX 250 GENERAL PHARMACY W/ HCPCS (ALT 636 FOR OP/ED)

## 2023-11-01 PROCEDURE — 2500000004 HC RX 250 GENERAL PHARMACY W/ HCPCS (ALT 636 FOR OP/ED): Performed by: UROLOGY

## 2023-11-01 PROCEDURE — 88305 TISSUE EXAM BY PATHOLOGIST: CPT | Mod: TC,SUR,PARLAB | Performed by: UROLOGY

## 2023-11-01 PROCEDURE — 3600000009 HC OR TIME - EACH INCREMENTAL 1 MINUTE - PROCEDURE LEVEL FOUR: Performed by: UROLOGY

## 2023-11-01 PROCEDURE — A15275 PR SUB GRFT F/S/N/H/F/G/M/D /<100SCM /<1ST 25 SCM

## 2023-11-01 PROCEDURE — 7100000001 HC RECOVERY ROOM TIME - INITIAL BASE CHARGE: Performed by: UROLOGY

## 2023-11-01 PROCEDURE — 2500000004 HC RX 250 GENERAL PHARMACY W/ HCPCS (ALT 636 FOR OP/ED): Performed by: ANESTHESIOLOGY

## 2023-11-01 PROCEDURE — 2500000001 HC RX 250 WO HCPCS SELF ADMINISTERED DRUGS (ALT 637 FOR MEDICARE OP): Performed by: NURSE PRACTITIONER

## 2023-11-01 PROCEDURE — A15275 PR SUB GRFT F/S/N/H/F/G/M/D /<100SCM /<1ST 25 SCM: Performed by: ANESTHESIOLOGY

## 2023-11-01 PROCEDURE — 2720000007 HC OR 272 NO HCPCS: Performed by: UROLOGY

## 2023-11-01 PROCEDURE — A4217 STERILE WATER/SALINE, 500 ML: HCPCS | Performed by: UROLOGY

## 2023-11-01 PROCEDURE — 2780000003 HC OR 278 NO HCPCS: Performed by: UROLOGY

## 2023-11-01 PROCEDURE — 92242 FLUORESCEIN&ICG ANGIOGRAPHY: CPT | Performed by: UROLOGY

## 2023-11-01 PROCEDURE — 85027 COMPLETE CBC AUTOMATED: CPT | Performed by: NURSE PRACTITIONER

## 2023-11-01 PROCEDURE — 80048 BASIC METABOLIC PNL TOTAL CA: CPT | Performed by: NURSE PRACTITIONER

## 2023-11-01 PROCEDURE — 3700000001 HC GENERAL ANESTHESIA TIME - INITIAL BASE CHARGE: Performed by: UROLOGY

## 2023-11-01 PROCEDURE — 1100000001 HC PRIVATE ROOM DAILY

## 2023-11-01 PROCEDURE — 14301 TIS TRNFR ANY 30.1-60 SQ CM: CPT | Performed by: UROLOGY

## 2023-11-01 PROCEDURE — 7100000002 HC RECOVERY ROOM TIME - EACH INCREMENTAL 1 MINUTE: Performed by: UROLOGY

## 2023-11-01 PROCEDURE — 14302 TIS TRNFR ADDL 30 SQ CM: CPT | Performed by: UROLOGY

## 2023-11-01 PROCEDURE — 3600000004 HC OR TIME - INITIAL BASE CHARGE - PROCEDURE LEVEL FOUR: Performed by: UROLOGY

## 2023-11-01 PROCEDURE — 88305 TISSUE EXAM BY PATHOLOGIST: CPT | Mod: TC,PARLAB | Performed by: UROLOGY

## 2023-11-01 PROCEDURE — 2500000005 HC RX 250 GENERAL PHARMACY W/O HCPCS

## 2023-11-01 PROCEDURE — 3700000002 HC GENERAL ANESTHESIA TIME - EACH INCREMENTAL 1 MINUTE: Performed by: UROLOGY

## 2023-11-01 PROCEDURE — 36415 COLL VENOUS BLD VENIPUNCTURE: CPT | Performed by: NURSE PRACTITIONER

## 2023-11-01 PROCEDURE — 15275 SKIN SUB GRAFT FACE/NK/HF/G: CPT | Performed by: UROLOGY

## 2023-11-01 PROCEDURE — 88305 TISSUE EXAM BY PATHOLOGIST: CPT | Performed by: STUDENT IN AN ORGANIZED HEALTH CARE EDUCATION/TRAINING PROGRAM

## 2023-11-01 DEVICE — FISH-SKIN GRAFT FOR SURGICAL USE. KERECIS® SURGICLOSE® IS INDICATED FOR THE MANAGEMENT OF WOUNDS INCLUDING: PARTIAL AND FULL THICKNESS WOUNDS, PRESSURE ULCERS, CHRONIC VASCULAR ULCERS, DIABETIC ULCERS, TRAUMA WOUNDS (ABRASIONS, LACERATIONS, SECOND-DEGREE BURNS, SKIN TEARS), SURGICAL WOUNDS (DONOR SITE/GRAFTS, POST-MOHS SURGERY, POST-LASER SURGERY, PODIATRIC, WOUND DEHISCENCE) AND DRAINING WOUNDS.IFU: HTTPS://WWW.KERECIS.COM/IFUS/IFU-KERECIS-SURGICLOSE/
Type: IMPLANTABLE DEVICE | Site: PENIS | Status: FUNCTIONAL
Brand: KERECIS® SURGICLOSE®

## 2023-11-01 RX ORDER — ACETAMINOPHEN 500 MG
1000 TABLET ORAL EVERY 6 HOURS PRN
Qty: 30 TABLET | Refills: 0 | Status: ON HOLD | OUTPATIENT
Start: 2023-11-01 | End: 2023-11-13

## 2023-11-01 RX ORDER — ACETAMINOPHEN 325 MG/1
975 TABLET ORAL ONCE
Status: COMPLETED | OUTPATIENT
Start: 2023-11-01 | End: 2023-11-01

## 2023-11-01 RX ORDER — SODIUM CHLORIDE 0.9 G/100ML
IRRIGANT IRRIGATION AS NEEDED
Status: DISCONTINUED | OUTPATIENT
Start: 2023-11-01 | End: 2023-11-01 | Stop reason: HOSPADM

## 2023-11-01 RX ORDER — MEPERIDINE HYDROCHLORIDE 25 MG/ML
12.5 INJECTION INTRAMUSCULAR; INTRAVENOUS; SUBCUTANEOUS EVERY 10 MIN PRN
Status: DISCONTINUED | OUTPATIENT
Start: 2023-11-01 | End: 2023-11-01 | Stop reason: HOSPADM

## 2023-11-01 RX ORDER — SODIUM CHLORIDE 9 MG/ML
75 INJECTION, SOLUTION INTRAVENOUS CONTINUOUS
Status: DISCONTINUED | OUTPATIENT
Start: 2023-11-01 | End: 2023-11-01

## 2023-11-01 RX ORDER — CEFAZOLIN SODIUM 2 G/100ML
2 INJECTION, SOLUTION INTRAVENOUS ONCE
Status: COMPLETED | OUTPATIENT
Start: 2023-11-01 | End: 2023-11-01

## 2023-11-01 RX ORDER — SODIUM CHLORIDE, SODIUM LACTATE, POTASSIUM CHLORIDE, CALCIUM CHLORIDE 600; 310; 30; 20 MG/100ML; MG/100ML; MG/100ML; MG/100ML
100 INJECTION, SOLUTION INTRAVENOUS CONTINUOUS
Status: DISCONTINUED | OUTPATIENT
Start: 2023-11-01 | End: 2023-11-01

## 2023-11-01 RX ORDER — ALBUTEROL SULFATE 0.83 MG/ML
2.5 SOLUTION RESPIRATORY (INHALATION) ONCE AS NEEDED
Status: DISCONTINUED | OUTPATIENT
Start: 2023-11-01 | End: 2023-11-01 | Stop reason: HOSPADM

## 2023-11-01 RX ORDER — HYDRALAZINE HYDROCHLORIDE 20 MG/ML
5 INJECTION INTRAMUSCULAR; INTRAVENOUS EVERY 30 MIN PRN
Status: DISCONTINUED | OUTPATIENT
Start: 2023-11-01 | End: 2023-11-01 | Stop reason: HOSPADM

## 2023-11-01 RX ORDER — LIDOCAINE HCL/PF 100 MG/5ML
SYRINGE (ML) INTRAVENOUS AS NEEDED
Status: DISCONTINUED | OUTPATIENT
Start: 2023-11-01 | End: 2023-11-01

## 2023-11-01 RX ORDER — MIDAZOLAM HYDROCHLORIDE 1 MG/ML
1 INJECTION, SOLUTION INTRAMUSCULAR; INTRAVENOUS ONCE AS NEEDED
Status: DISCONTINUED | OUTPATIENT
Start: 2023-11-01 | End: 2023-11-01 | Stop reason: HOSPADM

## 2023-11-01 RX ORDER — LABETALOL HYDROCHLORIDE 5 MG/ML
5 INJECTION, SOLUTION INTRAVENOUS ONCE AS NEEDED
Status: DISCONTINUED | OUTPATIENT
Start: 2023-11-01 | End: 2023-11-01 | Stop reason: HOSPADM

## 2023-11-01 RX ORDER — DIPHENHYDRAMINE HYDROCHLORIDE 50 MG/ML
25 INJECTION INTRAMUSCULAR; INTRAVENOUS ONCE AS NEEDED
Status: DISCONTINUED | OUTPATIENT
Start: 2023-11-01 | End: 2023-11-01 | Stop reason: HOSPADM

## 2023-11-01 RX ORDER — KETOROLAC TROMETHAMINE 30 MG/ML
15 INJECTION, SOLUTION INTRAMUSCULAR; INTRAVENOUS EVERY 6 HOURS
Status: DISCONTINUED | OUTPATIENT
Start: 2023-11-01 | End: 2023-11-02 | Stop reason: HOSPADM

## 2023-11-01 RX ORDER — AMOXICILLIN 250 MG
2 CAPSULE ORAL 2 TIMES DAILY
Status: DISCONTINUED | OUTPATIENT
Start: 2023-11-01 | End: 2023-11-02 | Stop reason: HOSPADM

## 2023-11-01 RX ORDER — MORPHINE SULFATE 2 MG/ML
2 INJECTION, SOLUTION INTRAMUSCULAR; INTRAVENOUS EVERY 5 MIN PRN
Status: DISCONTINUED | OUTPATIENT
Start: 2023-11-01 | End: 2023-11-01 | Stop reason: HOSPADM

## 2023-11-01 RX ORDER — OXYCODONE HYDROCHLORIDE 5 MG/1
5 TABLET ORAL EVERY 4 HOURS PRN
Status: DISCONTINUED | OUTPATIENT
Start: 2023-11-01 | End: 2023-11-02 | Stop reason: HOSPADM

## 2023-11-01 RX ORDER — OXYCODONE HYDROCHLORIDE 5 MG/1
5 TABLET ORAL EVERY 6 HOURS PRN
Qty: 4 TABLET | Refills: 0 | Status: SHIPPED | OUTPATIENT
Start: 2023-11-01 | End: 2023-11-13 | Stop reason: HOSPADM

## 2023-11-01 RX ORDER — METOPROLOL TARTRATE 1 MG/ML
5 INJECTION, SOLUTION INTRAVENOUS ONCE
Status: DISCONTINUED | OUTPATIENT
Start: 2023-11-01 | End: 2023-11-01 | Stop reason: HOSPADM

## 2023-11-01 RX ORDER — ONDANSETRON HYDROCHLORIDE 2 MG/ML
INJECTION, SOLUTION INTRAVENOUS AS NEEDED
Status: DISCONTINUED | OUTPATIENT
Start: 2023-11-01 | End: 2023-11-01

## 2023-11-01 RX ORDER — FENTANYL CITRATE 50 UG/ML
INJECTION, SOLUTION INTRAMUSCULAR; INTRAVENOUS AS NEEDED
Status: DISCONTINUED | OUTPATIENT
Start: 2023-11-01 | End: 2023-11-01

## 2023-11-01 RX ORDER — MEPERIDINE HYDROCHLORIDE 25 MG/ML
INJECTION INTRAMUSCULAR; INTRAVENOUS; SUBCUTANEOUS AS NEEDED
Status: DISCONTINUED | OUTPATIENT
Start: 2023-11-01 | End: 2023-11-01

## 2023-11-01 RX ORDER — ACETAMINOPHEN 325 MG/1
975 TABLET ORAL EVERY 6 HOURS
Status: DISCONTINUED | OUTPATIENT
Start: 2023-11-01 | End: 2023-11-02 | Stop reason: HOSPADM

## 2023-11-01 RX ORDER — PROMETHAZINE HYDROCHLORIDE 50 MG/ML
12.5 INJECTION, SOLUTION INTRAMUSCULAR; INTRAVENOUS ONCE AS NEEDED
Status: DISCONTINUED | OUTPATIENT
Start: 2023-11-01 | End: 2023-11-01 | Stop reason: HOSPADM

## 2023-11-01 RX ORDER — INDOCYANINE GREEN AND WATER 25 MG
KIT INJECTION AS NEEDED
Status: DISCONTINUED | OUTPATIENT
Start: 2023-11-01 | End: 2023-11-01

## 2023-11-01 RX ORDER — ONDANSETRON HYDROCHLORIDE 2 MG/ML
4 INJECTION, SOLUTION INTRAVENOUS EVERY 6 HOURS PRN
Status: DISCONTINUED | OUTPATIENT
Start: 2023-11-01 | End: 2023-11-02 | Stop reason: HOSPADM

## 2023-11-01 RX ORDER — PROPOFOL 10 MG/ML
INJECTION, EMULSION INTRAVENOUS AS NEEDED
Status: DISCONTINUED | OUTPATIENT
Start: 2023-11-01 | End: 2023-11-01

## 2023-11-01 RX ORDER — HYDROMORPHONE HYDROCHLORIDE 1 MG/ML
1 INJECTION, SOLUTION INTRAMUSCULAR; INTRAVENOUS; SUBCUTANEOUS EVERY 5 MIN PRN
Status: DISCONTINUED | OUTPATIENT
Start: 2023-11-01 | End: 2023-11-01 | Stop reason: HOSPADM

## 2023-11-01 RX ORDER — ONDANSETRON 4 MG/1
4 TABLET, FILM COATED ORAL EVERY 6 HOURS PRN
Status: DISCONTINUED | OUTPATIENT
Start: 2023-11-01 | End: 2023-11-02 | Stop reason: HOSPADM

## 2023-11-01 RX ORDER — FAMOTIDINE 10 MG/ML
20 INJECTION INTRAVENOUS ONCE
Status: COMPLETED | OUTPATIENT
Start: 2023-11-01 | End: 2023-11-01

## 2023-11-01 RX ORDER — DEXAMETHASONE SODIUM PHOSPHATE 4 MG/ML
INJECTION, SOLUTION INTRA-ARTICULAR; INTRALESIONAL; INTRAMUSCULAR; INTRAVENOUS; SOFT TISSUE AS NEEDED
Status: DISCONTINUED | OUTPATIENT
Start: 2023-11-01 | End: 2023-11-01

## 2023-11-01 RX ORDER — OXYCODONE HYDROCHLORIDE 5 MG/1
10 TABLET ORAL EVERY 4 HOURS PRN
Status: DISCONTINUED | OUTPATIENT
Start: 2023-11-01 | End: 2023-11-02 | Stop reason: HOSPADM

## 2023-11-01 RX ORDER — ONDANSETRON HYDROCHLORIDE 2 MG/ML
4 INJECTION, SOLUTION INTRAVENOUS ONCE AS NEEDED
Status: DISCONTINUED | OUTPATIENT
Start: 2023-11-01 | End: 2023-11-01 | Stop reason: HOSPADM

## 2023-11-01 RX ORDER — SODIUM CHLORIDE, SODIUM LACTATE, POTASSIUM CHLORIDE, CALCIUM CHLORIDE 600; 310; 30; 20 MG/100ML; MG/100ML; MG/100ML; MG/100ML
100 INJECTION, SOLUTION INTRAVENOUS CONTINUOUS
Status: DISCONTINUED | OUTPATIENT
Start: 2023-11-01 | End: 2023-11-01 | Stop reason: HOSPADM

## 2023-11-01 RX ORDER — SCOLOPAMINE TRANSDERMAL SYSTEM 1 MG/1
1 PATCH, EXTENDED RELEASE TRANSDERMAL ONCE
Status: DISCONTINUED | OUTPATIENT
Start: 2023-11-01 | End: 2023-11-01 | Stop reason: HOSPADM

## 2023-11-01 RX ORDER — MIDAZOLAM HYDROCHLORIDE 1 MG/ML
INJECTION, SOLUTION INTRAMUSCULAR; INTRAVENOUS AS NEEDED
Status: DISCONTINUED | OUTPATIENT
Start: 2023-11-01 | End: 2023-11-01

## 2023-11-01 RX ADMIN — MIDAZOLAM 2 MG: 1 INJECTION INTRAMUSCULAR; INTRAVENOUS at 07:31

## 2023-11-01 RX ADMIN — FAMOTIDINE 20 MG: 10 INJECTION, SOLUTION INTRAVENOUS at 10:29

## 2023-11-01 RX ADMIN — KETOROLAC TROMETHAMINE 15 MG: 30 INJECTION, SOLUTION INTRAMUSCULAR; INTRAVENOUS at 20:30

## 2023-11-01 RX ADMIN — MEPERIDINE HYDROCHLORIDE 25 MG: 25 INJECTION INTRAMUSCULAR; INTRAVENOUS; SUBCUTANEOUS at 09:30

## 2023-11-01 RX ADMIN — CEFAZOLIN SODIUM 2 G: 2 INJECTION, SOLUTION INTRAVENOUS at 07:33

## 2023-11-01 RX ADMIN — SENNOSIDES AND DOCUSATE SODIUM 2 TABLET: 50; 8.6 TABLET ORAL at 21:51

## 2023-11-01 RX ADMIN — INDOCYANINE GREEN AND WATER 5 MG: KIT at 08:51

## 2023-11-01 RX ADMIN — LIDOCAINE HYDROCHLORIDE 60 MG: 20 INJECTION INTRAVENOUS at 07:39

## 2023-11-01 RX ADMIN — SODIUM CHLORIDE, SODIUM LACTATE, POTASSIUM CHLORIDE, AND CALCIUM CHLORIDE: .6; .31; .03; .02 INJECTION, SOLUTION INTRAVENOUS at 07:29

## 2023-11-01 RX ADMIN — DEXAMETHASONE SODIUM PHOSPHATE 8 MG: 4 INJECTION, SOLUTION INTRAMUSCULAR; INTRAVENOUS at 07:47

## 2023-11-01 RX ADMIN — INDOCYANINE GREEN AND WATER 5 MG: KIT at 08:25

## 2023-11-01 RX ADMIN — FENTANYL CITRATE 50 MCG: 50 INJECTION, SOLUTION INTRAMUSCULAR; INTRAVENOUS at 09:26

## 2023-11-01 RX ADMIN — FENTANYL CITRATE 50 MCG: 50 INJECTION, SOLUTION INTRAMUSCULAR; INTRAVENOUS at 08:17

## 2023-11-01 RX ADMIN — FENTANYL CITRATE 100 MCG: 50 INJECTION, SOLUTION INTRAMUSCULAR; INTRAVENOUS at 07:36

## 2023-11-01 RX ADMIN — PROPOFOL 200 MG: 10 INJECTION, EMULSION INTRAVENOUS at 07:39

## 2023-11-01 RX ADMIN — SODIUM CHLORIDE 75 ML/HR: 9 INJECTION, SOLUTION INTRAVENOUS at 14:21

## 2023-11-01 RX ADMIN — ACETAMINOPHEN 975 MG: 325 TABLET ORAL at 10:30

## 2023-11-01 RX ADMIN — SENNOSIDES AND DOCUSATE SODIUM 2 TABLET: 50; 8.6 TABLET ORAL at 14:22

## 2023-11-01 RX ADMIN — ACETAMINOPHEN 975 MG: 325 TABLET ORAL at 23:30

## 2023-11-01 RX ADMIN — KETOROLAC TROMETHAMINE 15 MG: 30 INJECTION, SOLUTION INTRAMUSCULAR; INTRAVENOUS at 14:21

## 2023-11-01 RX ADMIN — SODIUM CHLORIDE, POTASSIUM CHLORIDE, SODIUM LACTATE AND CALCIUM CHLORIDE 100 ML/HR: 600; 310; 30; 20 INJECTION, SOLUTION INTRAVENOUS at 06:46

## 2023-11-01 RX ADMIN — ONDANSETRON 4 MG: 2 INJECTION INTRAMUSCULAR; INTRAVENOUS at 08:43

## 2023-11-01 RX ADMIN — SODIUM CHLORIDE, POTASSIUM CHLORIDE, SODIUM LACTATE AND CALCIUM CHLORIDE 100 ML/HR: 600; 310; 30; 20 INJECTION, SOLUTION INTRAVENOUS at 10:36

## 2023-11-01 SDOH — SOCIAL STABILITY: SOCIAL INSECURITY: DO YOU FEEL ANYONE HAS EXPLOITED OR TAKEN ADVANTAGE OF YOU FINANCIALLY OR OF YOUR PERSONAL PROPERTY?: NO

## 2023-11-01 SDOH — SOCIAL STABILITY: SOCIAL INSECURITY: ARE YOU OR HAVE YOU BEEN THREATENED OR ABUSED PHYSICALLY, EMOTIONALLY, OR SEXUALLY BY ANYONE?: NO

## 2023-11-01 SDOH — SOCIAL STABILITY: SOCIAL INSECURITY: DOES ANYONE TRY TO KEEP YOU FROM HAVING/CONTACTING OTHER FRIENDS OR DOING THINGS OUTSIDE YOUR HOME?: NO

## 2023-11-01 SDOH — SOCIAL STABILITY: SOCIAL INSECURITY: HAVE YOU HAD THOUGHTS OF HARMING ANYONE ELSE?: NO

## 2023-11-01 SDOH — SOCIAL STABILITY: SOCIAL INSECURITY: DO YOU FEEL UNSAFE GOING BACK TO THE PLACE WHERE YOU ARE LIVING?: NO

## 2023-11-01 SDOH — SOCIAL STABILITY: SOCIAL INSECURITY: WERE YOU ABLE TO COMPLETE ALL THE BEHAVIORAL HEALTH SCREENINGS?: YES

## 2023-11-01 SDOH — SOCIAL STABILITY: SOCIAL INSECURITY: HAS ANYONE EVER THREATENED TO HURT YOUR FAMILY OR YOUR PETS?: NO

## 2023-11-01 SDOH — SOCIAL STABILITY: SOCIAL INSECURITY: ARE THERE ANY APPARENT SIGNS OF INJURIES/BEHAVIORS THAT COULD BE RELATED TO ABUSE/NEGLECT?: NO

## 2023-11-01 SDOH — SOCIAL STABILITY: SOCIAL INSECURITY: ABUSE: ADULT

## 2023-11-01 ASSESSMENT — LIFESTYLE VARIABLES
AUDIT-C TOTAL SCORE: 1
SUBSTANCE_ABUSE_PAST_12_MONTHS: YES
HOW OFTEN DO YOU HAVE 6 OR MORE DRINKS ON ONE OCCASION: NEVER
PRESCIPTION_ABUSE_PAST_12_MONTHS: NO
AUDIT-C TOTAL SCORE: 1
SKIP TO QUESTIONS 9-10: 1
HOW MANY STANDARD DRINKS CONTAINING ALCOHOL DO YOU HAVE ON A TYPICAL DAY: 1 OR 2
HOW OFTEN DO YOU HAVE A DRINK CONTAINING ALCOHOL: MONTHLY OR LESS

## 2023-11-01 ASSESSMENT — COGNITIVE AND FUNCTIONAL STATUS - GENERAL
PATIENT BASELINE BEDBOUND: NO
MOBILITY SCORE: 24
DAILY ACTIVITIY SCORE: 24

## 2023-11-01 ASSESSMENT — PATIENT HEALTH QUESTIONNAIRE - PHQ9
1. LITTLE INTEREST OR PLEASURE IN DOING THINGS: NOT AT ALL
2. FEELING DOWN, DEPRESSED OR HOPELESS: NOT AT ALL
SUM OF ALL RESPONSES TO PHQ9 QUESTIONS 1 & 2: 0

## 2023-11-01 ASSESSMENT — ACTIVITIES OF DAILY LIVING (ADL)
BATHING: INDEPENDENT
GROOMING: INDEPENDENT
HEARING - RIGHT EAR: FUNCTIONAL
DRESSING YOURSELF: INDEPENDENT
HEARING - LEFT EAR: FUNCTIONAL
FEEDING YOURSELF: INDEPENDENT
WALKS IN HOME: INDEPENDENT
PATIENT'S MEMORY ADEQUATE TO SAFELY COMPLETE DAILY ACTIVITIES?: YES
ADEQUATE_TO_COMPLETE_ADL: YES
JUDGMENT_ADEQUATE_SAFELY_COMPLETE_DAILY_ACTIVITIES: YES
LACK_OF_TRANSPORTATION: NO
TOILETING: INDEPENDENT

## 2023-11-01 ASSESSMENT — PAIN SCALES - GENERAL
PAINLEVEL_OUTOF10: 0 - NO PAIN
PAINLEVEL_OUTOF10: 2
PAIN_LEVEL: 0
PAINLEVEL_OUTOF10: 0 - NO PAIN
PAINLEVEL_OUTOF10: 0 - NO PAIN

## 2023-11-01 ASSESSMENT — PAIN - FUNCTIONAL ASSESSMENT
PAIN_FUNCTIONAL_ASSESSMENT: 0-10

## 2023-11-01 ASSESSMENT — COLUMBIA-SUICIDE SEVERITY RATING SCALE - C-SSRS
6. HAVE YOU EVER DONE ANYTHING, STARTED TO DO ANYTHING, OR PREPARED TO DO ANYTHING TO END YOUR LIFE?: NO
1. IN THE PAST MONTH, HAVE YOU WISHED YOU WERE DEAD OR WISHED YOU COULD GO TO SLEEP AND NOT WAKE UP?: NO
1. IN THE PAST MONTH, HAVE YOU WISHED YOU WERE DEAD OR WISHED YOU COULD GO TO SLEEP AND NOT WAKE UP?: NO
6. HAVE YOU EVER DONE ANYTHING, STARTED TO DO ANYTHING, OR PREPARED TO DO ANYTHING TO END YOUR LIFE?: NO
2. HAVE YOU ACTUALLY HAD ANY THOUGHTS OF KILLING YOURSELF?: NO
2. HAVE YOU ACTUALLY HAD ANY THOUGHTS OF KILLING YOURSELF?: NO

## 2023-11-01 NOTE — ANESTHESIA PROCEDURE NOTES
Airway  Date/Time: 11/1/2023 7:40 AM  Urgency: elective    Airway not difficult    Staffing  Performed: CRNA   Authorized by: Tremayne Elena MD    Performed by: ADRIANO Whyte-STEVE  Patient location during procedure: OR    Indications and Patient Condition  Indications for airway management: anesthesia and airway protection  Spontaneous Ventilation: absent  Sedation level: deep  Preoxygenated: yes  Patient position: sniffing  MILS maintained throughout  Mask difficulty assessment: 0 - not attempted    Final Airway Details  Final airway type: supraglottic airway      Successful airway: Size 4     Number of attempts at approach: 1

## 2023-11-01 NOTE — DISCHARGE INSTRUCTIONS
DEPARTMENT OF UROLOGY  DISCHARGE INSTRUCTIONS --   Outpatient Surgery      Damian Galdamez      Call [phone] during regular daytime business hours (8:00 am - 5:00 pm) and after 5:00 pm ask for the Urology resident with any questions or concerns.      If it is a life-threatening situation, proceed to the nearest emergency department.        Follow-up appointment:  Dr Castillo / Sunday Yusuf    Thank you for the opportunity to care for you today.  Your health and healing are very important to us.  We hope we made you feel as comfortable as possible and are committed to your recovery and continued well-being.      The following is a brief overview of your circumcision procedure today. Some of the information contained on this summary may be confidential.  This information should be kept in your records and should be shared with your regular doctor.      Procedure performed: removed the foreskin from your penis  Pending results:  none     What to Expect During your Recovery and Home Care  Anesthesia Side Effects   You received  anesthesia today.  You may feel sleepy, tired, or have a sore throat.   You may also feel drowsiness, dizziness, or inability to think clearly.  For your safety, do not drive, drink alcoholic beverages, take any unprescribed medication or make any important decisions for 24 hours.  A responsible adult should be with you for 24 hours.        Activity and Recovery    No heavy lifting and rest the next few days. Refrain from sexual activity and getting an erection until all sutures have been dissolved and incision has healed.     Pain Control  Unfortunately, you may experience pain after your procedure.  Adequate pain management can include alternative measures to help ease your pain and that can include over the counter Tylenol/ibuprofen or oxycodone which can be taken as prescribed as needed for breakthrough pain. Do not take more than 4,000mg of Tylenol in a 24-hour period.       Nausea/Vomiting   Clear liquids are best tolerated at first. Start slow, advance your diet as tolerated to normal foods. Avoid spicy, greasy, heavy foods at first. Also, you may feel nauseous or like you need to vomit if you take any type of medication on an empty stomach.  Call your physician if you are unable to eat or drink and have persistent vomiting.    Signs of Bleeding   Minor bleeding or drainage may occur from the surgical site; however, excessive or consistent bleeding should be reported to your surgeon. Excessive bleeding is defined as blood that is dripping from wound, soaking your bandages, and is ketchup colored, thick with possible blood clots.  Consistent is defined as bleeding that does not stop.      Treatment/wound care:   Keep area(s) clean and dry.   It is okay to shower 24 hours after time of surgery.    Do not scrub wound(s), pat dry.    Do not submerge wound(s) in standing water until seen for follow up appointment (no tub bathing, swimming, or hot tubs).    Clean with mild soap, gentle washing, pat dry, cover with bandage as needed.  Avoid waterproof bandages.  No oils or lotions on incisions.  Do not remove the sutures on your own, they will dissolve or fall out on their own time.    Please visually inspect your wound(s) at least once daily.  If the wound(s) are in a difficult to see location, please use a mirror or have someone else assist with visual inspection.    Signs of Infection  Signs of infection can include fever, redness, heat, swelling, drainage(green/yellow), chills, burning sensation with passing of urine, or severe abdominal pain.  If you see any of these occur, please contact your doctor's office at [phone]. Any fever higher than 100.4, especially if associated with an ill feeling, abdominal pain, chills, or nausea should be reported to your surgeon.      Assist in bowel movements/urination  Increase fiber in diet  Increase water (6 to 8 glasses)  Increase walking    Urination should occur within 6 hours of anesthesia  If you have tried these methods and your bladder still feels full and you cannot use the bathroom, please go to your nearest Emergency room/contact your physician.    Additional Instructions:   Use the antibiotic ointment(bacitracin) provided to you and place on your incision daily.

## 2023-11-01 NOTE — ANESTHESIA POSTPROCEDURE EVALUATION
Patient: Damian Galdamez    Procedure Summary       Date: 11/01/23 Room / Location: [facility] OR 06 / Virtual [facility]    Anesthesia Start: 0729 Anesthesia Stop: 0931    Procedure: COMPLEX REPAIR OF GENITALIA (Penis) Diagnosis:       Unspecified open wound of unspecified external genital organs, male, initial encounter      (Unspecified open wound of unspecified external genital organs, male, initial encounter [S31.501A])    Surgeons: Bladimir Castillo MD Responsible Provider: Tremayne Elena MD    Anesthesia Type: general ASA Status: 2            Anesthesia Type: general    Vitals Value Taken Time   /71 11/01/23 0931   Temp 36.7 11/01/23 0931   Pulse 91 11/01/23 0931   Resp 16 11/01/23 0931   SpO2 100 11/01/23 0931       Anesthesia Post Evaluation    Patient location during evaluation: PACU  Patient participation: complete - patient participated  Level of consciousness: awake and alert  Pain score: 0  Pain management: satisfactory to patient  Airway patency: patent  Cardiovascular status: acceptable, hemodynamically stable and blood pressure returned to baseline  Respiratory status: acceptable and face mask  Hydration status: acceptable      There were no known notable events for this encounter.

## 2023-11-01 NOTE — H&P
History Of Present Illness  Damian Galdamez is a 23 y.o. adult presenting with gender dysphoria, complex wound of ALT neophallus.     Past Medical History  No past medical history on file.    Surgical History  No past surgical history on file.     Social History  He has no history on file for tobacco use, alcohol use, and drug use.    Family History  No family history on file.     Allergies  Patient has no known allergies.           Assessment/Plan   Active Problems:  There are no active Hospital Problems.      Plan  Proceed to OR for complex repair of neophallus           Sho De Anda MD

## 2023-11-01 NOTE — OP NOTE
COMPLEX REPAIR OF GENITALIA Operative Note     Date: 2023  OR Location: [facility]    Name: Damian Galdamez, : 2000, Age: 23 y.o., MRN: [id number], Sex: adult    Diagnosis  Pre-op Diagnosis     * Unspecified open wound of unspecified external genital organs, male, initial encounter [S31.501A] Post-op Diagnosis     * Unspecified open wound of unspecified external genital organs, male, initial encounter [S31.501A]     Procedures  COMPLEX REPAIR OF GENITALIA  27893 - DE SUB GRFT F/S/N/H/F/G/M/D <100SQ CM 1ST 25 SQ CM    Complex repair of genitalia 13 cm  Adjacent tissue transfer 13 cm x 6 cm equals 78 cm².  Use of spy angiography  Placement of Kerecis acellular graft in the wound bed to allow for closure    Surgeons      * Bladimir Castillo - Primary    Resident/Fellow/Other Assistant:  Surgeon(s) and Role:     * Sho De Anda MD - Resident - Observing    Procedure Summary  Anesthesia: General  ASA: II  Anesthesia Staff: Anesthesiologist: Tremayne Elena MD  CRNA: ADRIANO Whyte-CRNA  Estimated Blood Loss: 10 mL  Intra-op Medications:   Medication Name Total Dose   sodium chloride 0.9 % irrigation solution 500 mL   ceFAZolin in dextrose (iso-os) (Ancef) IVPB 2 g 2 g              Anesthesia Record               Intraprocedure I/O Totals          Intake    .00 mL    Propofol Drip 0.00 mL    The total shown is the total volume documented since Anesthesia Start was filed.    Total Intake 700 mL          Specimen:   ID Type Source Tests Collected by Time   1 : PENIS SCAR Tissue PENIS RESECTION SURGICAL PATHOLOGY EXAM Bladimir Castillo MD 2023 0830        Staff:   Circulator: Elvi Carson RN; Danilo Kaur RN  Relief Circulator: Tiffanie Yang RN  Scrub Person: Denise Mendoza         Drains and/or Catheters: * None in log *    Tourniquet Times:         Implants:  Implants       Type Name Action Serial No.      Implant SURGICLOSE, OMEGA3, 126CM2 - [id number] Implanted               Findings: Excision of  scar tissue with partial closure of the flap achieved.    Indications: Damian Galdamez is an 23 y.o. adult who is having surgery for closure of phallus.  He had an anterolateral thigh phalloplasty about 6 weeks ago.  Ventral closure was not achieved because of flap tightness.  He is here for attempting closure of the ventral aspect.  The patient was seen in the preoperative area. The risks, benefits, complications, treatment options, non-operative alternatives, expected recovery and outcomes were discussed with the patient. The possibilities of reaction to medication, pulmonary aspiration, injury to surrounding structures, bleeding, recurrent infection, the need for additional procedures, failure to diagnose a condition, and creating a complication requiring transfusion or operation were discussed with the patient. The patient concurred with the proposed plan, giving informed consent.  The site of surgery was properly noted/marked if necessary per policy. The patient has been actively warmed in preoperative area. Preoperative antibiotics have been ordered and given within 1 hours of incision. Venous thrombosis prophylaxis have been ordered including bilateral sequential compression devices    Procedure Details: Patient was brought to the operating suite, laid supine on the operating table.  A huddle was performed.  He was anesthetized.  An LMA was placed.  Using a supine position, and prepped and draped in a standard fashion.  We inspected the ventral aspect of the phallus.  There was noted to be some ventral chordee due to some of the contracture.  There is noted to be a wide swath of granulation tissue in the middle, with lateral edges of scarring and contracture.  A timeout was taken.  The granulation tissue in the associated scar underneath as well as along the sides of it was completely excised using Bovie electrocautery.  This allowed the ventral aspect of the phallus to be completely freed up.  We measured the  dimensions of the defect.  It was 13 cm long.  In order to try and close it primarily, we mobilized the base of the phallus where it was connected to the groin skin.  This was done bilaterally but more on the left side.  Left flap was raised by incising along the line of the implantation for about 5 cm, the right flap was raised for about 3 cm.  After this, an assessment of the wound size was made.  This was 13 cm x 6 cm.  We advanced the inferior flaps medially to allow them to meet in the midline.  Superiorly, some debulking was done in order to allow skin flaps to be able to close.  There was noted to be some residual edema in the tissue.  After judicious debulking, it was apparent that we were only able to close part of the flap and of the entirety of it.  Closure of the entirety of the flap would lead to undue tension.  At this time, the distal aspect of the flap was closed with interrupted PDS and 3-0 Monocryl, over the proximal aspect was left open.  A spy angiography was performed after sculpting of the phallus to assess for perfusion and the dye was perfused and infused per 's instructions.  Angiography showed good perfusion of the flap with some focal areas of reduced perfusion but with no areas of absent perfusion.  This was sufficient enough for us.  Therefore the end, we performed a sculpting of the distal half of the phallus, advancement of the base of the phallus flaps, and the proximal aspect of the ventral midline was left open.  This was advanced close to each other but as we still had about 5 cm x 2 cm area that is open.  We decided to place Kerecis in this wound and this was rolled in and placed in this wound to protect the Mike as well as filling the graft.  Mepitel and Xeroform and Kerlix were applied.  Patient tolerated the procedure well.  Complications:  None; patient tolerated the procedure well.    Disposition: PACU - hemodynamically stable.  Condition: stable          Additional Details: We will keep him in-house overnight just to monitor his lab, he should be able to go home tomorrow.    Attending Attestation: I was present and scrubbed for the entire procedure.    Bladimir Castilol  Phone Number: [phone]

## 2023-11-01 NOTE — ANESTHESIA PREPROCEDURE EVALUATION
Patient: Damian Galdamez    Procedure Information       Date/Time: 11/01/23 0730    Procedure: COMPLEX REPAIR OF GENITALIA    Location: [facility] 06 / [facility]    Surgeons: Bladimir aCstillo MD            Relevant Problems   Anesthesia (within normal limits)       Clinical information reviewed:    Allergies  Meds     OB Status           NPO Detail:  NPO/Void Status  Date of Last Liquid: 10/31/23  Time of Last Liquid: 0528  Date of Last Solid: 10/31/23  Time of Last Solid: 1930         Physical Exam    Airway  Mallampati: II  TM distance: >3 FB  Neck ROM: full     Cardiovascular - normal exam  Rhythm: regular  Rate: normal     Dental    Pulmonary - normal exam     Abdominal            Anesthesia Plan    ASA 2     general     intravenous induction   Anesthetic plan and risks discussed with patient.    Plan discussed with attending and CRNA.

## 2023-11-01 NOTE — PERIOPERATIVE NURSING NOTE
Floor asked for hold on patient due to situation on the floor. Patient and family member advised for the delay on transfer to room 215

## 2023-11-02 VITALS
WEIGHT: 170 LBS | HEART RATE: 87 BPM | OXYGEN SATURATION: 99 % | BODY MASS INDEX: 25.76 KG/M2 | RESPIRATION RATE: 18 BRPM | SYSTOLIC BLOOD PRESSURE: 133 MMHG | HEIGHT: 68 IN | TEMPERATURE: 99.1 F | DIASTOLIC BLOOD PRESSURE: 78 MMHG

## 2023-11-02 LAB
ANION GAP SERPL CALC-SCNC: 10 MMOL/L (ref 10–20)
BUN SERPL-MCNC: 16 MG/DL (ref 6–23)
CALCIUM SERPL-MCNC: 8.8 MG/DL (ref 8.6–10.3)
CHLORIDE SERPL-SCNC: 108 MMOL/L (ref 98–107)
CO2 SERPL-SCNC: 27 MMOL/L (ref 21–32)
CREAT SERPL-MCNC: 0.83 MG/DL (ref 0.5–1.3)
ERYTHROCYTE [DISTWIDTH] IN BLOOD BY AUTOMATED COUNT: 12.8 % (ref 11.5–14.5)
GFR SERPL CREATININE-BSD FRML MDRD: >90 ML/MIN/1.73M*2
GLUCOSE SERPL-MCNC: 94 MG/DL (ref 74–99)
HCT VFR BLD AUTO: 40.3 % (ref 36–52)
HGB BLD-MCNC: 13 G/DL (ref 12–17.5)
MCH RBC QN AUTO: 30.3 PG (ref 26–34)
MCHC RBC AUTO-ENTMCNC: 32.3 G/DL (ref 32–36)
MCV RBC AUTO: 94 FL (ref 80–100)
NRBC BLD-RTO: 0 /100 WBCS (ref 0–0)
PLATELET # BLD AUTO: 286 X10*3/UL (ref 150–450)
POTASSIUM SERPL-SCNC: 4.9 MMOL/L (ref 3.5–5.3)
RBC # BLD AUTO: 4.29 X10*6/UL (ref 4–5.9)
SODIUM SERPL-SCNC: 140 MMOL/L (ref 136–145)
WBC # BLD AUTO: 14.7 X10*3/UL (ref 4.4–11.3)

## 2023-11-02 PROCEDURE — 99239 HOSP IP/OBS DSCHRG MGMT >30: CPT | Performed by: NURSE PRACTITIONER

## 2023-11-02 PROCEDURE — 36415 COLL VENOUS BLD VENIPUNCTURE: CPT | Performed by: NURSE PRACTITIONER

## 2023-11-02 PROCEDURE — 80048 BASIC METABOLIC PNL TOTAL CA: CPT | Performed by: NURSE PRACTITIONER

## 2023-11-02 PROCEDURE — 85027 COMPLETE CBC AUTOMATED: CPT | Performed by: NURSE PRACTITIONER

## 2023-11-02 PROCEDURE — 2500000004 HC RX 250 GENERAL PHARMACY W/ HCPCS (ALT 636 FOR OP/ED): Performed by: NURSE PRACTITIONER

## 2023-11-02 PROCEDURE — 2500000001 HC RX 250 WO HCPCS SELF ADMINISTERED DRUGS (ALT 637 FOR MEDICARE OP): Performed by: NURSE PRACTITIONER

## 2023-11-02 RX ADMIN — KETOROLAC TROMETHAMINE 15 MG: 30 INJECTION, SOLUTION INTRAMUSCULAR; INTRAVENOUS at 08:54

## 2023-11-02 RX ADMIN — KETOROLAC TROMETHAMINE 15 MG: 30 INJECTION, SOLUTION INTRAMUSCULAR; INTRAVENOUS at 02:37

## 2023-11-02 RX ADMIN — ACETAMINOPHEN 975 MG: 325 TABLET ORAL at 05:43

## 2023-11-02 RX ADMIN — SENNOSIDES AND DOCUSATE SODIUM 2 TABLET: 50; 8.6 TABLET ORAL at 08:42

## 2023-11-02 ASSESSMENT — PAIN SCALES - GENERAL
PAINLEVEL_OUTOF10: 0 - NO PAIN

## 2023-11-02 NOTE — CARE PLAN
Problem: Pain - Adult  Goal: Verbalizes/displays adequate comfort level or baseline comfort level  Outcome: Met     Problem: Safety - Adult  Goal: Free from fall injury  Outcome: Met     Problem: Discharge Planning  Goal: Discharge to home or other facility with appropriate resources  Outcome: Met     Problem: Chronic Conditions and Co-morbidities  Goal: Patient's chronic conditions and co-morbidity symptoms are monitored and maintained or improved  Outcome: Met   The patient's goals for the shift include      The clinical goals for the shift include pain management

## 2023-11-02 NOTE — PROGRESS NOTES
11/2/2023   Met with pt in room, introduced self and explained role.  Verified insurance, address, phone.Pt is from home in [place]. Lives with amy. Stated completely independent.  Performs own ADL's.  Has support from fiance and family.  Sister is here and will take home upon discharge.  Plan is for home.  Support provided.   Jamila Larson RN TCC

## 2023-11-02 NOTE — CARE PLAN
The patient's goals for the shift include      The clinical goals for the shift include keep patient safe and pain free      Problem: Pain - Adult  Goal: Verbalizes/displays adequate comfort level or baseline comfort level  Outcome: Progressing     Problem: Safety - Adult  Goal: Free from fall injury  Outcome: Progressing     Problem: Discharge Planning  Goal: Discharge to home or other facility with appropriate resources  Outcome: Progressing     Problem: Chronic Conditions and Co-morbidities  Goal: Patient's chronic conditions and co-morbidity symptoms are monitored and maintained or improved  Outcome: Progressing     Problem: Skin  Goal: Decreased wound size/increased tissue granulation at next dressing change  Flowsheets (Taken 11/1/2023 2359)  Decreased wound size/increased tissue granulation at next dressing change: Promote sleep for wound healing  Goal: Participates in plan/prevention/treatment measures  Flowsheets (Taken 11/1/2023 2359)  Participates in plan/prevention/treatment measures: Elevate heels  Goal: Prevent/manage excess moisture  Flowsheets (Taken 11/1/2023 2359)  Prevent/manage excess moisture: Follow provider orders for dressing changes  Goal: Prevent/minimize sheer/friction injuries  Flowsheets (Taken 11/1/2023 2359)  Prevent/minimize sheer/friction injuries:   Turn/reposition every 2 hours/use positioning/transfer devices   HOB 30 degrees or less  Goal: Promote/optimize nutrition  Flowsheets (Taken 11/1/2023 2359)  Promote/optimize nutrition:   Offer water/supplements/favorite foods   Monitor/record intake including meals  Goal: Promote skin healing  Flowsheets (Taken 11/1/2023 2359)  Promote skin healing:   Assess skin/pad under line(s)/device(s)   Turn/reposition every 2 hours/use positioning/transfer devices

## 2023-11-02 NOTE — DISCHARGE SUMMARY
Discharge Diagnosis  Acute postoperative pain    Issues Requiring Follow-Up  Post-op visit, wound check    Test Results Pending At Discharge  Pending Labs       Order Current Status    Surgical Pathology Exam In process            Hospital Course   24 yo male with gender dysphoria and flap closure. He had ALT thigh phalloplasty 6 weeks ago and ventral closure was not achieved because of flap tightness. On 11/1 he underwent excision of scar tissue with partial closure of the flap with Kerecis mesh in place. Post-operative recovery is as expected. Pain is well controlled. VSS. Phallus is warm to touch with normal skin tone, no new bruising, and good cap refill. Incisions remain intact and Kerecis mesh in place. Mepitel, Xeroform and Kerlix dressing applied to ventral side of phallus. Wound care education completed.     Pertinent Physical Exam At Time of Discharge  Physical Exam  Constitutional:       Appearance: Normal appearance.   HENT:      Mouth/Throat:      Mouth: Mucous membranes are moist.   Cardiovascular:      Rate and Rhythm: Normal rate and regular rhythm.   Pulmonary:      Effort: Pulmonary effort is normal.      Breath sounds: Normal breath sounds.   Abdominal:      Palpations: Abdomen is soft.   Genitourinary:     Comments: Phallus is warm to touch with normal skin tone, good cap refill and no bruising. Ventral aspect of phallus with incisions clean and intact, kereics mesh in place with sutures and staples. Area covered with Mepitel, Xeroform, and Kerlix. Base of phallus incision is clean and intact.   Skin:     General: Skin is warm and dry.   Neurological:      Mental Status: He is alert and oriented to person, place, and time. Mental status is at baseline.   Psychiatric:         Mood and Affect: Mood normal.         Behavior: Behavior normal.         Home Medications     Medication List      START taking these medications     acetaminophen 500 mg tablet; Commonly known as: Tylenol; Take 2 tablets    (1,000 mg) by mouth every 6 hours if needed for mild pain (1 - 3) for up   to 5 days.   oxyCODONE 5 mg immediate release tablet; Commonly known as: Roxicodone;   Take 1 tablet (5 mg) by mouth every 6 hours if needed for severe pain (7 -   10) for up to 4 doses.     CONTINUE taking these medications     gabapentin 100 mg capsule; Commonly known as: Neurontin; Take 1 capsule   (100 mg) by mouth 2 times a day for 14 days.   testosterone cypionate 100 mg/mL injection; Commonly known as:   Depo-Testosterone       Outpatient Follow-Up  Future Appointments   Date Time Provider Department Center   12/11/2023  1:30 PM Bladimir Castillo MD [id number] [place]       ADRIANO Park-CNP

## 2023-11-04 ENCOUNTER — TELEPHONE (OUTPATIENT)
Dept: UROLOGY | Facility: HOSPITAL | Age: 23
End: 2023-11-04
Payer: COMMERCIAL

## 2023-11-04 NOTE — TELEPHONE ENCOUNTER
Received page from [facility] regarding this patient at 1636 on 11/4/23.    Briefly, the patient is a 23 year old transmale who underwent ALT phalloplasty in September and is most recently s/p complex ventral phallus closure. Patient noted that there was increased opening of the surgical wound today in the shower and presented to the ED near his home in [facility]. Per discussion with the ED physician Dr. Jigar Negrete, the surgical wound appeared to be open but without any purulence, necrosis, or erythema. No fevers, chills, or systemic signs of infection. Wound cultures obtained, labs within normal limits, vitals stable.    Based on clinical picture, it did not appear necessary for the patient to emergently present to the  ED. However, he would benefit from being seen sooner than his 6-week post-operative appointment in December.     Plan discussed with [facility] ED:  - Discharge with oral antibiotics  - Will arrange outpatient follow up with gender care team this upcoming week for a wound check  - Patient to present to the [facility] [place] ED with any signs of wound or systemic infection and/or call with concerns    Darlene Green MD PhD  Urology PGY4

## 2023-11-06 ENCOUNTER — OFFICE VISIT (OUTPATIENT)
Dept: UROLOGY | Facility: CLINIC | Age: 23
End: 2023-11-06
Payer: COMMERCIAL

## 2023-11-06 ENCOUNTER — PREP FOR PROCEDURE (OUTPATIENT)
Dept: UROLOGY | Facility: CLINIC | Age: 23
End: 2023-11-06

## 2023-11-06 ENCOUNTER — HOSPITAL ENCOUNTER (INPATIENT)
Facility: HOSPITAL | Age: 23
LOS: 7 days | Discharge: HOME | DRG: 909 | End: 2023-11-13
Attending: UROLOGY | Admitting: UROLOGY
Payer: COMMERCIAL

## 2023-11-06 VITALS
HEIGHT: 68 IN | BODY MASS INDEX: 26.22 KG/M2 | HEART RATE: 82 BPM | WEIGHT: 173 LBS | SYSTOLIC BLOOD PRESSURE: 113 MMHG | TEMPERATURE: 98.7 F | DIASTOLIC BLOOD PRESSURE: 102 MMHG

## 2023-11-06 DIAGNOSIS — T14.8XXA WOUND INFECTION: Primary | ICD-10-CM

## 2023-11-06 DIAGNOSIS — F64.9 GENDER DYSPHORIA: Primary | ICD-10-CM

## 2023-11-06 DIAGNOSIS — F64.9 GENDER DYSPHORIA: ICD-10-CM

## 2023-11-06 DIAGNOSIS — L08.9 WOUND INFECTION: Primary | ICD-10-CM

## 2023-11-06 DIAGNOSIS — T81.49XA SURGICAL SITE INFECTION: Primary | ICD-10-CM

## 2023-11-06 DIAGNOSIS — G89.18 ACUTE POSTOPERATIVE PAIN: ICD-10-CM

## 2023-11-06 LAB
ANION GAP SERPL CALC-SCNC: 11 MMOL/L (ref 10–20)
BUN SERPL-MCNC: 20 MG/DL (ref 6–23)
CALCIUM SERPL-MCNC: 9.3 MG/DL (ref 8.6–10.3)
CHLORIDE SERPL-SCNC: 103 MMOL/L (ref 98–107)
CO2 SERPL-SCNC: 27 MMOL/L (ref 21–32)
CREAT SERPL-MCNC: 0.94 MG/DL (ref 0.5–1.3)
ERYTHROCYTE [DISTWIDTH] IN BLOOD BY AUTOMATED COUNT: 12.2 % (ref 11.5–14.5)
GFR SERPL CREATININE-BSD FRML MDRD: 87 ML/MIN/1.73M*2
GLUCOSE SERPL-MCNC: 124 MG/DL (ref 74–99)
HCT VFR BLD AUTO: 41.3 % (ref 36–52)
HGB BLD-MCNC: 14.1 G/DL (ref 12–17.5)
MCH RBC QN AUTO: 30.9 PG (ref 26–34)
MCHC RBC AUTO-ENTMCNC: 34.1 G/DL (ref 32–36)
MCV RBC AUTO: 91 FL (ref 80–100)
NRBC BLD-RTO: 0 /100 WBCS (ref 0–0)
PLATELET # BLD AUTO: 288 X10*3/UL (ref 150–450)
POTASSIUM SERPL-SCNC: 3.8 MMOL/L (ref 3.5–5.3)
RBC # BLD AUTO: 4.56 X10*6/UL (ref 4–5.9)
SODIUM SERPL-SCNC: 137 MMOL/L (ref 136–145)
WBC # BLD AUTO: 9.1 X10*3/UL (ref 4.4–11.3)

## 2023-11-06 PROCEDURE — 1036F TOBACCO NON-USER: CPT | Performed by: UROLOGY

## 2023-11-06 PROCEDURE — 2500000001 HC RX 250 WO HCPCS SELF ADMINISTERED DRUGS (ALT 637 FOR MEDICARE OP): Performed by: NURSE PRACTITIONER

## 2023-11-06 PROCEDURE — 99024 POSTOP FOLLOW-UP VISIT: CPT | Performed by: UROLOGY

## 2023-11-06 PROCEDURE — 99232 SBSQ HOSP IP/OBS MODERATE 35: CPT | Performed by: NURSE PRACTITIONER

## 2023-11-06 PROCEDURE — 36415 COLL VENOUS BLD VENIPUNCTURE: CPT | Performed by: NURSE PRACTITIONER

## 2023-11-06 PROCEDURE — 85027 COMPLETE CBC AUTOMATED: CPT | Performed by: NURSE PRACTITIONER

## 2023-11-06 PROCEDURE — 80048 BASIC METABOLIC PNL TOTAL CA: CPT | Performed by: NURSE PRACTITIONER

## 2023-11-06 PROCEDURE — 2500000004 HC RX 250 GENERAL PHARMACY W/ HCPCS (ALT 636 FOR OP/ED): Performed by: NURSE PRACTITIONER

## 2023-11-06 PROCEDURE — 1100000001 HC PRIVATE ROOM DAILY

## 2023-11-06 PROCEDURE — 96372 THER/PROPH/DIAG INJ SC/IM: CPT | Performed by: NURSE PRACTITIONER

## 2023-11-06 RX ORDER — KETOROLAC TROMETHAMINE 30 MG/ML
15 INJECTION, SOLUTION INTRAMUSCULAR; INTRAVENOUS EVERY 6 HOURS
Status: DISPENSED | OUTPATIENT
Start: 2023-11-06 | End: 2023-11-09

## 2023-11-06 RX ORDER — AMOXICILLIN 250 MG
2 CAPSULE ORAL 2 TIMES DAILY
Status: DISCONTINUED | OUTPATIENT
Start: 2023-11-06 | End: 2023-11-13 | Stop reason: HOSPADM

## 2023-11-06 RX ORDER — ONDANSETRON 4 MG/1
4 TABLET, FILM COATED ORAL EVERY 6 HOURS PRN
Status: DISCONTINUED | OUTPATIENT
Start: 2023-11-06 | End: 2023-11-13 | Stop reason: HOSPADM

## 2023-11-06 RX ORDER — GABAPENTIN 300 MG/1
600 CAPSULE ORAL ONCE
Status: CANCELLED | OUTPATIENT
Start: 2023-11-06 | End: 2023-11-06

## 2023-11-06 RX ORDER — ACETAMINOPHEN 325 MG/1
975 TABLET ORAL EVERY 6 HOURS
Status: DISCONTINUED | OUTPATIENT
Start: 2023-11-06 | End: 2023-11-13 | Stop reason: HOSPADM

## 2023-11-06 RX ORDER — ACETAMINOPHEN 325 MG/1
975 TABLET ORAL ONCE
Status: CANCELLED | OUTPATIENT
Start: 2023-11-06 | End: 2023-11-06

## 2023-11-06 RX ORDER — OXYCODONE HYDROCHLORIDE 5 MG/1
5 TABLET ORAL EVERY 4 HOURS PRN
Status: DISCONTINUED | OUTPATIENT
Start: 2023-11-06 | End: 2023-11-13 | Stop reason: HOSPADM

## 2023-11-06 RX ORDER — SODIUM CHLORIDE 9 MG/ML
100 INJECTION, SOLUTION INTRAVENOUS CONTINUOUS
Status: CANCELLED | OUTPATIENT
Start: 2023-11-06

## 2023-11-06 RX ORDER — ONDANSETRON HYDROCHLORIDE 2 MG/ML
4 INJECTION, SOLUTION INTRAVENOUS EVERY 6 HOURS PRN
Status: DISCONTINUED | OUTPATIENT
Start: 2023-11-06 | End: 2023-11-13 | Stop reason: HOSPADM

## 2023-11-06 RX ORDER — CELECOXIB 50 MG/1
400 CAPSULE ORAL ONCE
Status: CANCELLED | OUTPATIENT
Start: 2023-11-06 | End: 2023-11-06

## 2023-11-06 RX ORDER — SULFAMETHOXAZOLE AND TRIMETHOPRIM 800; 160 MG/1; MG/1
1 TABLET ORAL 2 TIMES DAILY
COMMUNITY
Start: 2023-11-04 | End: 2023-11-13 | Stop reason: HOSPADM

## 2023-11-06 RX ORDER — ENOXAPARIN SODIUM 100 MG/ML
40 INJECTION SUBCUTANEOUS EVERY 24 HOURS
Status: DISCONTINUED | OUTPATIENT
Start: 2023-11-06 | End: 2023-11-13 | Stop reason: HOSPADM

## 2023-11-06 RX ORDER — OXYCODONE HYDROCHLORIDE 5 MG/1
10 TABLET ORAL EVERY 4 HOURS PRN
Status: DISCONTINUED | OUTPATIENT
Start: 2023-11-06 | End: 2023-11-13 | Stop reason: HOSPADM

## 2023-11-06 RX ADMIN — ACETAMINOPHEN 975 MG: 325 TABLET ORAL at 13:54

## 2023-11-06 RX ADMIN — KETOROLAC TROMETHAMINE 15 MG: 30 INJECTION, SOLUTION INTRAMUSCULAR; INTRAVENOUS at 13:55

## 2023-11-06 RX ADMIN — ENOXAPARIN SODIUM 40 MG: 100 INJECTION SUBCUTANEOUS at 13:55

## 2023-11-06 RX ADMIN — VANCOMYCIN HYDROCHLORIDE 1.25 G: 1.25 INJECTION, POWDER, LYOPHILIZED, FOR SOLUTION INTRAVENOUS at 15:08

## 2023-11-06 RX ADMIN — KETOROLAC TROMETHAMINE 15 MG: 30 INJECTION, SOLUTION INTRAMUSCULAR; INTRAVENOUS at 21:21

## 2023-11-06 RX ADMIN — SENNOSIDES AND DOCUSATE SODIUM 2 TABLET: 8.6; 5 TABLET ORAL at 21:21

## 2023-11-06 RX ADMIN — SENNOSIDES AND DOCUSATE SODIUM 2 TABLET: 8.6; 5 TABLET ORAL at 13:58

## 2023-11-06 RX ADMIN — ACETAMINOPHEN 975 MG: 325 TABLET ORAL at 21:21

## 2023-11-06 RX ADMIN — GENTAMICIN SULFATE 240 MG: 40 INJECTION, SOLUTION INTRAMUSCULAR; INTRAVENOUS at 13:55

## 2023-11-06 SDOH — SOCIAL STABILITY: SOCIAL INSECURITY: ABUSE: ADULT

## 2023-11-06 SDOH — SOCIAL STABILITY: SOCIAL INSECURITY: HAS ANYONE EVER THREATENED TO HURT YOUR FAMILY OR YOUR PETS?: NO

## 2023-11-06 SDOH — SOCIAL STABILITY: SOCIAL INSECURITY: HAVE YOU HAD THOUGHTS OF HARMING ANYONE ELSE?: NO

## 2023-11-06 SDOH — SOCIAL STABILITY: SOCIAL INSECURITY: DO YOU FEEL UNSAFE GOING BACK TO THE PLACE WHERE YOU ARE LIVING?: NO

## 2023-11-06 SDOH — SOCIAL STABILITY: SOCIAL INSECURITY: DO YOU FEEL ANYONE HAS EXPLOITED OR TAKEN ADVANTAGE OF YOU FINANCIALLY OR OF YOUR PERSONAL PROPERTY?: NO

## 2023-11-06 SDOH — SOCIAL STABILITY: SOCIAL INSECURITY: WERE YOU ABLE TO COMPLETE ALL THE BEHAVIORAL HEALTH SCREENINGS?: YES

## 2023-11-06 SDOH — SOCIAL STABILITY: SOCIAL INSECURITY: ARE YOU OR HAVE YOU BEEN THREATENED OR ABUSED PHYSICALLY, EMOTIONALLY, OR SEXUALLY BY ANYONE?: NO

## 2023-11-06 SDOH — SOCIAL STABILITY: SOCIAL INSECURITY: DOES ANYONE TRY TO KEEP YOU FROM HAVING/CONTACTING OTHER FRIENDS OR DOING THINGS OUTSIDE YOUR HOME?: NO

## 2023-11-06 SDOH — SOCIAL STABILITY: SOCIAL INSECURITY: ARE THERE ANY APPARENT SIGNS OF INJURIES/BEHAVIORS THAT COULD BE RELATED TO ABUSE/NEGLECT?: NO

## 2023-11-06 ASSESSMENT — LIFESTYLE VARIABLES
PRESCIPTION_ABUSE_PAST_12_MONTHS: NO
HOW OFTEN DO YOU HAVE A DRINK CONTAINING ALCOHOL: MONTHLY OR LESS
SKIP TO QUESTIONS 9-10: 1
HOW OFTEN DO YOU HAVE 6 OR MORE DRINKS ON ONE OCCASION: NEVER
AUDIT-C TOTAL SCORE: 1
AUDIT-C TOTAL SCORE: 1
AUDIT-C TOTAL SCORE: 0
HOW MANY STANDARD DRINKS CONTAINING ALCOHOL DO YOU HAVE ON A TYPICAL DAY: PATIENT DOES NOT DRINK
HOW MANY STANDARD DRINKS CONTAINING ALCOHOL DO YOU HAVE ON A TYPICAL DAY: 1 OR 2
AUDIT-C TOTAL SCORE: 0
HOW OFTEN DO YOU HAVE 6 OR MORE DRINKS ON ONE OCCASION: NEVER
SUBSTANCE_ABUSE_PAST_12_MONTHS: NO
PRESCIPTION_ABUSE_PAST_12_MONTHS: NO
SKIP TO QUESTIONS 9-10: 1
SUBSTANCE_ABUSE_PAST_12_MONTHS: NO
SUBSTANCE_ABUSE_PAST_12_MONTHS: NO
HOW OFTEN DO YOU HAVE 6 OR MORE DRINKS ON ONE OCCASION: NEVER
AUDIT-C TOTAL SCORE: 1
HOW MANY STANDARD DRINKS CONTAINING ALCOHOL DO YOU HAVE ON A TYPICAL DAY: 1 OR 2
AUDIT-C TOTAL SCORE: 1
SKIP TO QUESTIONS 9-10: 1
PRESCIPTION_ABUSE_PAST_12_MONTHS: NO
HOW OFTEN DO YOU HAVE A DRINK CONTAINING ALCOHOL: NEVER
HOW OFTEN DO YOU HAVE A DRINK CONTAINING ALCOHOL: MONTHLY OR LESS

## 2023-11-06 ASSESSMENT — ACTIVITIES OF DAILY LIVING (ADL)
BATHING: INDEPENDENT
GROOMING: INDEPENDENT
BATHING: INDEPENDENT
GROOMING: INDEPENDENT
PATIENT'S MEMORY ADEQUATE TO SAFELY COMPLETE DAILY ACTIVITIES?: YES
WALKS IN HOME: INDEPENDENT
WALKS IN HOME: INDEPENDENT
HEARING - LEFT EAR: FUNCTIONAL
TOILETING: INDEPENDENT
ADEQUATE_TO_COMPLETE_ADL: YES
JUDGMENT_ADEQUATE_SAFELY_COMPLETE_DAILY_ACTIVITIES: YES
PATIENT'S MEMORY ADEQUATE TO SAFELY COMPLETE DAILY ACTIVITIES?: YES
TOILETING: INDEPENDENT
FEEDING YOURSELF: INDEPENDENT
ADEQUATE_TO_COMPLETE_ADL: YES
DRESSING YOURSELF: INDEPENDENT
LACK_OF_TRANSPORTATION: NO
HEARING - RIGHT EAR: FUNCTIONAL
HEARING - LEFT EAR: FUNCTIONAL
FEEDING YOURSELF: INDEPENDENT
JUDGMENT_ADEQUATE_SAFELY_COMPLETE_DAILY_ACTIVITIES: YES
HEARING - RIGHT EAR: FUNCTIONAL
DRESSING YOURSELF: INDEPENDENT

## 2023-11-06 ASSESSMENT — COGNITIVE AND FUNCTIONAL STATUS - GENERAL
MOBILITY SCORE: 22
WALKING IN HOSPITAL ROOM: A LITTLE
CLIMB 3 TO 5 STEPS WITH RAILING: A LITTLE
PATIENT BASELINE BEDBOUND: NO
DAILY ACTIVITIY SCORE: 24
PATIENT BASELINE BEDBOUND: YES

## 2023-11-06 ASSESSMENT — COLUMBIA-SUICIDE SEVERITY RATING SCALE - C-SSRS
6. HAVE YOU EVER DONE ANYTHING, STARTED TO DO ANYTHING, OR PREPARED TO DO ANYTHING TO END YOUR LIFE?: NO
1. IN THE PAST MONTH, HAVE YOU WISHED YOU WERE DEAD OR WISHED YOU COULD GO TO SLEEP AND NOT WAKE UP?: NO
6. HAVE YOU EVER DONE ANYTHING, STARTED TO DO ANYTHING, OR PREPARED TO DO ANYTHING TO END YOUR LIFE?: NO
2. HAVE YOU ACTUALLY HAD ANY THOUGHTS OF KILLING YOURSELF?: NO
1. IN THE PAST MONTH, HAVE YOU WISHED YOU WERE DEAD OR WISHED YOU COULD GO TO SLEEP AND NOT WAKE UP?: NO
2. HAVE YOU ACTUALLY HAD ANY THOUGHTS OF KILLING YOURSELF?: NO

## 2023-11-06 ASSESSMENT — PATIENT HEALTH QUESTIONNAIRE - PHQ9
SUM OF ALL RESPONSES TO PHQ9 QUESTIONS 1 & 2: 0
1. LITTLE INTEREST OR PLEASURE IN DOING THINGS: NOT AT ALL
2. FEELING DOWN, DEPRESSED OR HOPELESS: NOT AT ALL
SUM OF ALL RESPONSES TO PHQ9 QUESTIONS 1 & 2: 0
2. FEELING DOWN, DEPRESSED OR HOPELESS: NOT AT ALL
1. LITTLE INTEREST OR PLEASURE IN DOING THINGS: NOT AT ALL
1. LITTLE INTEREST OR PLEASURE IN DOING THINGS: NOT AT ALL
2. FEELING DOWN, DEPRESSED OR HOPELESS: NOT AT ALL
SUM OF ALL RESPONSES TO PHQ9 QUESTIONS 1 & 2: 0

## 2023-11-06 ASSESSMENT — PAIN SCALES - GENERAL
PAINLEVEL_OUTOF10: 0 - NO PAIN
PAINLEVEL_OUTOF10: 0 - NO PAIN

## 2023-11-06 NOTE — PROGRESS NOTES
GENDER CARE POST-OP     HISTORY OF PRESENT ILLNESS:   Damian Galdamez is a 23 y.o. trans man s/pLAT closure last week. Has wound opening    INTERVAL HISTORY:  Wound dehiscence     PAST MEDICAL HISTORY:  No past medical history on file.    PAST SURGICAL HISTORY:  No past surgical history on file.     ALLERGIES:   No Known Allergies     MEDICATIONS:     Current Outpatient Medications:     testosterone cypionate (Depo-Testosterone) 100 mg/mL injection, Inject 0.6 mL (60 mg) into the muscle 1 (one) time per week., Disp: , Rfl:     acetaminophen (Tylenol) 500 mg tablet, Take 2 tablets (1,000 mg) by mouth every 6 hours if needed for mild pain (1 - 3) for up to 5 days. (Patient not taking: Reported on 11/6/2023), Disp: 30 tablet, Rfl: 0    gabapentin (Neurontin) 100 mg capsule, Take 1 capsule (100 mg) by mouth 2 times a day for 14 days., Disp: 28 capsule, Rfl: 0    oxyCODONE (Roxicodone) 5 mg immediate release tablet, Take 1 tablet (5 mg) by mouth every 6 hours if needed for severe pain (7 - 10) for up to 4 doses. (Patient not taking: Reported on 11/6/2023), Disp: 4 tablet, Rfl: 0     SOCIAL HISTORY:  Patient  reports that he has never smoked. He has never used smokeless tobacco. He reports current alcohol use. He reports current drug use. Drug: Marijuana.   Social History     Socioeconomic History    Marital status: Significant Other     Spouse name: Not on file    Number of children: Not on file    Years of education: Not on file    Highest education level: Not on file   Occupational History    Not on file   Tobacco Use    Smoking status: Never    Smokeless tobacco: Never   Substance and Sexual Activity    Alcohol use: Yes     Comment: 1 drink every 2 weeks    Drug use: Yes     Types: Marijuana     Comment: medical reasons    Sexual activity: Defer   Other Topics Concern    Not on file   Social History Narrative    Not on file     Social Determinants of Health     Financial Resource Strain: Low Risk  (11/1/2023)    Overall  Financial Resource Strain (CARDIA)     Difficulty of Paying Living Expenses: Not hard at all   Food Insecurity: Not on file   Transportation Needs: No Transportation Needs (11/1/2023)    PRAPARE - Transportation     Lack of Transportation (Medical): No     Lack of Transportation (Non-Medical): No   Physical Activity: Not on file   Stress: Not on file   Social Connections: Not on file   Intimate Partner Violence: Not on file   Housing Stability: Low Risk  (11/1/2023)    Housing Stability Vital Sign     Unable to Pay for Housing in the Last Year: No     Number of Places Lived in the Last Year: 2     Unstable Housing in the Last Year: No       FAMILY HISTORY:  No family history on file.    REVIEW OF SYSTEMS:  Constitutional: Negative for fever and chills.  Eyes: Negative for visual disturbance.   Respiratory: Negative for shortness of breath.    Cardiovascular: Negative for edema.   Gastrointestinal: Negative for nausea and vomiting.   Genitourinary: See interval history above.  Skin: Negative for rash.   Neurological: Negative for dizziness.   Psychiatric/Behavioral: Negative for decreased concentration.     PHYSICAL EXAM:  Visit Vitals  BP (!) 113/102   Pulse 82   Temp 37.1 °C (98.7 °F)     Constitutional: Patient appears well-developed and well-nourished. No distress.    Head: Normocephalic and atraumatic.    Neck: Normal range of motion.    Cardiovascular: Normal rate.    Pulmonary/Chest: Effort normal. No respiratory distress.   Abdominal: Nondistended.  : ventral surface of phallus >50% dehisced   Musculoskeletal: Normal range of motion.    Neurological: Alert and oriented to person, place, and time.  Psychiatric: Normal mood and affect. Thought content normal.      LABORATORY REVIEW:   Lab Results   Component Value Date    BUN 16 11/02/2023    CREATININE 0.83 11/02/2023    EGFR >90 11/02/2023     11/02/2023    K 4.9 11/02/2023     (H) 11/02/2023    CO2 27 11/02/2023    CALCIUM 8.8 11/02/2023       Lab Results   Component Value Date    WBC 14.7 (H) 11/02/2023    RBC 4.29 11/02/2023    HGB 13.0 11/02/2023    HCT 40.3 11/02/2023    MCV 94 11/02/2023    MCH 30.3 11/02/2023    MCHC 32.3 11/02/2023    RDW 12.8 11/02/2023     11/02/2023    MPV 9.8 11/01/2023               Assessment:   No diagnosis found.    Plan admit, IV abx, wound vac     Plan:   Continue wound care as instructed  Reviewed use of heat/ice for pain and swelling  Scheduled NSAID and/or acetaminophen for swelling  Measures to prevent constipation in the post-op period  RTC in 2-3 weeks for reassessment or sooner if needed  Encouraged to call in the interim with any questions, concerns

## 2023-11-06 NOTE — PROGRESS NOTES
Damian Galdamez is a 23 y.o. adult on day 0 of admission presenting with Wound infection.    Subjective   No acute distress. He is resting comfortably in bed and has minimal pain.        Objective     Physical Exam  Constitutional:       Appearance: Normal appearance.   HENT:      Mouth/Throat:      Mouth: Mucous membranes are moist.   Cardiovascular:      Rate and Rhythm: Normal rate and regular rhythm.   Pulmonary:      Effort: Pulmonary effort is normal.      Breath sounds: Normal breath sounds.   Abdominal:      Palpations: Abdomen is soft.   Genitourinary:     Comments: Neophallus is covered extensively with kerlix, patient hesitant to unwrap to prevent further incision breakdown. Will defer physical exam, patient was just evaluated in clinic and will be going to surgery tomorrow. Portion of phallus evaluated with good cap refill, warm to touch and normal skin tone  Skin:     General: Skin is warm and dry.   Neurological:      Mental Status: He is alert and oriented to person, place, and time. Mental status is at baseline.   Psychiatric:         Mood and Affect: Mood normal.         Behavior: Behavior normal.         Last Recorded Vitals  There were no vitals taken for this visit.  Intake/Output last 3 Shifts:  No intake/output data recorded.    Relevant Results  Scheduled medications  acetaminophen, 975 mg, oral, q6h  enoxaparin, 40 mg, subcutaneous, q24h  gentamicin, 3 mg/kg, intravenous, Once  ketorolac, 15 mg, intravenous, q6h  sennosides-docusate sodium, 2 tablet, oral, BID  vancomycin, 1,250 mg, intravenous, q12h      Continuous medications     PRN medications  PRN medications: ondansetron **OR** ondansetron, oxyCODONE, oxyCODONE    Results for orders placed or performed during the hospital encounter of 11/06/23 (from the past 24 hour(s))   CBC   Result Value Ref Range    WBC 9.1 4.4 - 11.3 x10*3/uL    nRBC 0.0 0.0 - 0.0 /100 WBCs    RBC 4.56 4.00 - 5.90 x10*6/uL    Hemoglobin 14.1 12.0 - 17.5 g/dL     Hematocrit 41.3 36.0 - 52.0 %    MCV 91 80 - 100 fL    MCH 30.9 26.0 - 34.0 pg    MCHC 34.1 32.0 - 36.0 g/dL    RDW 12.2 11.5 - 14.5 %    Platelets 288 150 - 450 x10*3/uL   Basic metabolic panel   Result Value Ref Range    Glucose 124 (H) 74 - 99 mg/dL    Sodium 137 136 - 145 mmol/L    Potassium 3.8 3.5 - 5.3 mmol/L    Chloride 103 98 - 107 mmol/L    Bicarbonate 27 21 - 32 mmol/L    Anion Gap 11 10 - 20 mmol/L    Urea Nitrogen 20 6 - 23 mg/dL    Creatinine 0.94 0.50 - 1.30 mg/dL    eGFR 87 >60 mL/min/1.73m*2    Calcium 9.3 8.6 - 10.3 mg/dL       No results found.                           Assessment/Plan   Principal Problem:    Wound infection    22 yo male with gender dysphoria s/p ALT thigh phalloplasty 6 weeks ago and ventral closure not achieved due to flap tightness. He returned on 11/1 and underwent excision of scar tissue with partial closure of flap with Kerecis mesh in place. Post-operative course as expected and patient was discharged home with incision care supplies and instructions. He returned to [facility] ER on Saturday with new wound dehiscence who deferred care to us. He was directly admitted from clinic today for wound dehiscence and possible surgical site infection. Labs WNL, No leukocytosis. Diastolic HTN, 113/102    - Keep phallus wrapped and elevated at 45 degrees with kerlix fluff  - Multimodal pain management  > Ice and hot packs OK to use as needed  - Continue bowel regimen to prevent constipation, avoid straining  - Continue Vancomycin and Gentamicin for surgical site infection  - Consult placed for infectious disease, appreciate recommendations  - Regular diet, NPO at 0000   - OR tomorrow for Exam under anesthesia and wound VAC placement  - Labs today and in AM  - Monitor blood pressure for ongoing HTN, notify provider if diastolic remains >90    Ppx: SCDs and subcutaneous Lovenox    Dispo: Home 11/13       I spent 30 minutes in the professional and overall care of this  patient.      Dee Dee Whal, APRN-CNP

## 2023-11-06 NOTE — H&P (VIEW-ONLY)
History Of Present Illness  Damian Galdamez is a 23 y.o. adult presenting with      Past Medical History  He has no past medical history on file.    Surgical History  He has no past surgical history on file.     Social History  He reports that he has never smoked. He has never used smokeless tobacco. He reports current alcohol use. He reports current drug use. Drug: Marijuana.    Family History  No family history on file.     Allergies  Patient has no known allergies.    Review of Systems     Physical Exam     Last Recorded Vitals  There were no vitals taken for this visit.    Relevant Results    Results for orders placed or performed during the hospital encounter of 11/06/23 (from the past 24 hour(s))   CBC   Result Value Ref Range    WBC 9.1 4.4 - 11.3 x10*3/uL    nRBC 0.0 0.0 - 0.0 /100 WBCs    RBC 4.56 4.00 - 5.90 x10*6/uL    Hemoglobin 14.1 12.0 - 17.5 g/dL    Hematocrit 41.3 36.0 - 52.0 %    MCV 91 80 - 100 fL    MCH 30.9 26.0 - 34.0 pg    MCHC 34.1 32.0 - 36.0 g/dL    RDW 12.2 11.5 - 14.5 %    Platelets 288 150 - 450 x10*3/uL   Basic metabolic panel   Result Value Ref Range    Glucose 124 (H) 74 - 99 mg/dL    Sodium 137 136 - 145 mmol/L    Potassium 3.8 3.5 - 5.3 mmol/L    Chloride 103 98 - 107 mmol/L    Bicarbonate 27 21 - 32 mmol/L    Anion Gap 11 10 - 20 mmol/L    Urea Nitrogen 20 6 - 23 mg/dL    Creatinine 0.94 0.50 - 1.30 mg/dL    eGFR 87 >60 mL/min/1.73m*2    Calcium 9.3 8.6 - 10.3 mg/dL       No results found.         Assessment/Plan              I spent  minutes in the professional and overall care of this patient.      Bladimir Castillo MD

## 2023-11-06 NOTE — H&P
History Of Present Illness  Damian Galdamez is a 23 y.o. adult presenting with      Past Medical History  He has no past medical history on file.    Surgical History  He has no past surgical history on file.     Social History  He reports that he has never smoked. He has never used smokeless tobacco. He reports current alcohol use. He reports current drug use. Drug: Marijuana.    Family History  No family history on file.     Allergies  Patient has no known allergies.    Review of Systems     Physical Exam     Last Recorded Vitals  There were no vitals taken for this visit.    Relevant Results    Results for orders placed or performed during the hospital encounter of 11/06/23 (from the past 24 hour(s))   CBC   Result Value Ref Range    WBC 9.1 4.4 - 11.3 x10*3/uL    nRBC 0.0 0.0 - 0.0 /100 WBCs    RBC 4.56 4.00 - 5.90 x10*6/uL    Hemoglobin 14.1 12.0 - 17.5 g/dL    Hematocrit 41.3 36.0 - 52.0 %    MCV 91 80 - 100 fL    MCH 30.9 26.0 - 34.0 pg    MCHC 34.1 32.0 - 36.0 g/dL    RDW 12.2 11.5 - 14.5 %    Platelets 288 150 - 450 x10*3/uL   Basic metabolic panel   Result Value Ref Range    Glucose 124 (H) 74 - 99 mg/dL    Sodium 137 136 - 145 mmol/L    Potassium 3.8 3.5 - 5.3 mmol/L    Chloride 103 98 - 107 mmol/L    Bicarbonate 27 21 - 32 mmol/L    Anion Gap 11 10 - 20 mmol/L    Urea Nitrogen 20 6 - 23 mg/dL    Creatinine 0.94 0.50 - 1.30 mg/dL    eGFR 87 >60 mL/min/1.73m*2    Calcium 9.3 8.6 - 10.3 mg/dL       No results found.         Assessment/Plan              I spent  minutes in the professional and overall care of this patient.      Bladimri Castillo MD

## 2023-11-07 ENCOUNTER — ANESTHESIA EVENT (OUTPATIENT)
Dept: OPERATING ROOM | Facility: HOSPITAL | Age: 23
DRG: 909 | End: 2023-11-07
Payer: COMMERCIAL

## 2023-11-07 ENCOUNTER — ANESTHESIA (OUTPATIENT)
Dept: OPERATING ROOM | Facility: HOSPITAL | Age: 23
DRG: 909 | End: 2023-11-07
Payer: COMMERCIAL

## 2023-11-07 LAB
ANION GAP SERPL CALC-SCNC: 11 MMOL/L (ref 10–20)
BUN SERPL-MCNC: 25 MG/DL (ref 6–23)
CALCIUM SERPL-MCNC: 9.3 MG/DL (ref 8.6–10.3)
CHLORIDE SERPL-SCNC: 101 MMOL/L (ref 98–107)
CO2 SERPL-SCNC: 26 MMOL/L (ref 21–32)
CREAT SERPL-MCNC: 1.07 MG/DL (ref 0.5–1.3)
ERYTHROCYTE [DISTWIDTH] IN BLOOD BY AUTOMATED COUNT: 12.5 % (ref 11.5–14.5)
GFR SERPL CREATININE-BSD FRML MDRD: 74 ML/MIN/1.73M*2
GLUCOSE SERPL-MCNC: 81 MG/DL (ref 74–99)
HCT VFR BLD AUTO: 40.2 % (ref 36–52)
HGB BLD-MCNC: 13 G/DL (ref 12–17.5)
MCH RBC QN AUTO: 29.8 PG (ref 26–34)
MCHC RBC AUTO-ENTMCNC: 32.3 G/DL (ref 32–36)
MCV RBC AUTO: 92 FL (ref 80–100)
NRBC BLD-RTO: 0 /100 WBCS (ref 0–0)
PLATELET # BLD AUTO: 269 X10*3/UL (ref 150–450)
POTASSIUM SERPL-SCNC: 4.4 MMOL/L (ref 3.5–5.3)
RBC # BLD AUTO: 4.36 X10*6/UL (ref 4–5.9)
SODIUM SERPL-SCNC: 134 MMOL/L (ref 136–145)
WBC # BLD AUTO: 5.4 X10*3/UL (ref 4.4–11.3)

## 2023-11-07 PROCEDURE — 1100000001 HC PRIVATE ROOM DAILY

## 2023-11-07 PROCEDURE — 2500000004 HC RX 250 GENERAL PHARMACY W/ HCPCS (ALT 636 FOR OP/ED)

## 2023-11-07 PROCEDURE — 11004 DBRDMT SKIN XTRNL GENT&PER: CPT | Performed by: UROLOGY

## 2023-11-07 PROCEDURE — 85027 COMPLETE CBC AUTOMATED: CPT | Performed by: NURSE PRACTITIONER

## 2023-11-07 PROCEDURE — 3600000009 HC OR TIME - EACH INCREMENTAL 1 MINUTE - PROCEDURE LEVEL FOUR: Performed by: UROLOGY

## 2023-11-07 PROCEDURE — 97606 NEG PRS WND THER DME>50 SQCM: CPT | Performed by: UROLOGY

## 2023-11-07 PROCEDURE — 2500000005 HC RX 250 GENERAL PHARMACY W/O HCPCS: Performed by: NURSE ANESTHETIST, CERTIFIED REGISTERED

## 2023-11-07 PROCEDURE — 7100000002 HC RECOVERY ROOM TIME - EACH INCREMENTAL 1 MINUTE: Performed by: UROLOGY

## 2023-11-07 PROCEDURE — 2500000004 HC RX 250 GENERAL PHARMACY W/ HCPCS (ALT 636 FOR OP/ED): Performed by: NURSE ANESTHETIST, CERTIFIED REGISTERED

## 2023-11-07 PROCEDURE — 3700000002 HC GENERAL ANESTHESIA TIME - EACH INCREMENTAL 1 MINUTE: Performed by: UROLOGY

## 2023-11-07 PROCEDURE — 36415 COLL VENOUS BLD VENIPUNCTURE: CPT | Performed by: NURSE PRACTITIONER

## 2023-11-07 PROCEDURE — 3700000001 HC GENERAL ANESTHESIA TIME - INITIAL BASE CHARGE: Performed by: UROLOGY

## 2023-11-07 PROCEDURE — A11004 PR DEBRIDE NECROTIC SKIN/ TISSUE, GENIT AND PERINM: Performed by: ANESTHESIOLOGY

## 2023-11-07 PROCEDURE — 7100000001 HC RECOVERY ROOM TIME - INITIAL BASE CHARGE: Performed by: UROLOGY

## 2023-11-07 PROCEDURE — 0VQS0ZZ REPAIR PENIS, OPEN APPROACH: ICD-10-PCS | Performed by: UROLOGY

## 2023-11-07 PROCEDURE — 87185 SC STD ENZYME DETCJ PER NZM: CPT | Mod: PARLAB | Performed by: UROLOGY

## 2023-11-07 PROCEDURE — 2500000004 HC RX 250 GENERAL PHARMACY W/ HCPCS (ALT 636 FOR OP/ED): Performed by: UROLOGY

## 2023-11-07 PROCEDURE — 2500000004 HC RX 250 GENERAL PHARMACY W/ HCPCS (ALT 636 FOR OP/ED): Performed by: NURSE PRACTITIONER

## 2023-11-07 PROCEDURE — 2720000007 HC OR 272 NO HCPCS: Performed by: UROLOGY

## 2023-11-07 PROCEDURE — A11004 PR DEBRIDE NECROTIC SKIN/ TISSUE, GENIT AND PERINM: Performed by: NURSE ANESTHETIST, CERTIFIED REGISTERED

## 2023-11-07 PROCEDURE — 2780000003 HC OR 278 NO HCPCS: Performed by: UROLOGY

## 2023-11-07 PROCEDURE — 3600000004 HC OR TIME - INITIAL BASE CHARGE - PROCEDURE LEVEL FOUR: Performed by: UROLOGY

## 2023-11-07 PROCEDURE — A4217 STERILE WATER/SALINE, 500 ML: HCPCS | Performed by: UROLOGY

## 2023-11-07 PROCEDURE — 2500000001 HC RX 250 WO HCPCS SELF ADMINISTERED DRUGS (ALT 637 FOR MEDICARE OP): Performed by: NURSE PRACTITIONER

## 2023-11-07 PROCEDURE — 0HD9XZZ EXTRACTION OF PERINEUM SKIN, EXTERNAL APPROACH: ICD-10-PCS | Performed by: UROLOGY

## 2023-11-07 PROCEDURE — 2500000001 HC RX 250 WO HCPCS SELF ADMINISTERED DRUGS (ALT 637 FOR MEDICARE OP): Performed by: UROLOGY

## 2023-11-07 PROCEDURE — 80048 BASIC METABOLIC PNL TOTAL CA: CPT | Performed by: NURSE PRACTITIONER

## 2023-11-07 DEVICE — FISH-SKIN GRAFT FOR SURGICAL USE. KERECIS® SURGICLOSE® IS INDICATED FOR THE MANAGEMENT OF WOUNDS INCLUDING: PARTIAL AND FULL THICKNESS WOUNDS, PRESSURE ULCERS, CHRONIC VASCULAR ULCERS, DIABETIC ULCERS, TRAUMA WOUNDS (ABRASIONS, LACERATIONS, SECOND-DEGREE BURNS, SKIN TEARS), SURGICAL WOUNDS (DONOR SITE/GRAFTS, POST-MOHS SURGERY, POST-LASER SURGERY, PODIATRIC, WOUND DEHISCENCE) AND DRAINING WOUNDS.IFU: HTTPS://WWW.KERECIS.COM/IFUS/IFU-KERECIS-SURGICLOSE/
Type: IMPLANTABLE DEVICE | Site: PENIS | Status: FUNCTIONAL
Brand: KERECIS® SURGICLOSE®

## 2023-11-07 RX ORDER — ACETAMINOPHEN 325 MG/1
650 TABLET ORAL EVERY 4 HOURS PRN
Status: DISCONTINUED | OUTPATIENT
Start: 2023-11-07 | End: 2023-11-07 | Stop reason: HOSPADM

## 2023-11-07 RX ORDER — PROPOFOL 10 MG/ML
INJECTION, EMULSION INTRAVENOUS AS NEEDED
Status: DISCONTINUED | OUTPATIENT
Start: 2023-11-07 | End: 2023-11-07

## 2023-11-07 RX ORDER — PROMETHAZINE HYDROCHLORIDE 50 MG/ML
12.5 INJECTION, SOLUTION INTRAMUSCULAR; INTRAVENOUS ONCE AS NEEDED
Status: DISCONTINUED | OUTPATIENT
Start: 2023-11-07 | End: 2023-11-07 | Stop reason: HOSPADM

## 2023-11-07 RX ORDER — PHENYLEPHRINE HCL IN 0.9% NACL 1 MG/10 ML
SYRINGE (ML) INTRAVENOUS AS NEEDED
Status: DISCONTINUED | OUTPATIENT
Start: 2023-11-07 | End: 2023-11-07

## 2023-11-07 RX ORDER — ONDANSETRON HYDROCHLORIDE 2 MG/ML
4 INJECTION, SOLUTION INTRAVENOUS ONCE AS NEEDED
Status: DISCONTINUED | OUTPATIENT
Start: 2023-11-07 | End: 2023-11-07 | Stop reason: HOSPADM

## 2023-11-07 RX ORDER — CELECOXIB 100 MG/1
400 CAPSULE ORAL ONCE
Status: COMPLETED | OUTPATIENT
Start: 2023-11-07 | End: 2023-11-07

## 2023-11-07 RX ORDER — HYDRALAZINE HYDROCHLORIDE 20 MG/ML
5 INJECTION INTRAMUSCULAR; INTRAVENOUS EVERY 30 MIN PRN
Status: DISCONTINUED | OUTPATIENT
Start: 2023-11-07 | End: 2023-11-07 | Stop reason: HOSPADM

## 2023-11-07 RX ORDER — SODIUM CHLORIDE, SODIUM LACTATE, POTASSIUM CHLORIDE, CALCIUM CHLORIDE 600; 310; 30; 20 MG/100ML; MG/100ML; MG/100ML; MG/100ML
100 INJECTION, SOLUTION INTRAVENOUS CONTINUOUS
Status: DISCONTINUED | OUTPATIENT
Start: 2023-11-07 | End: 2023-11-07 | Stop reason: HOSPADM

## 2023-11-07 RX ORDER — DIPHENHYDRAMINE HYDROCHLORIDE 50 MG/ML
12.5 INJECTION INTRAMUSCULAR; INTRAVENOUS ONCE AS NEEDED
Status: DISCONTINUED | OUTPATIENT
Start: 2023-11-07 | End: 2023-11-07 | Stop reason: HOSPADM

## 2023-11-07 RX ORDER — FENTANYL CITRATE 50 UG/ML
INJECTION, SOLUTION INTRAMUSCULAR; INTRAVENOUS AS NEEDED
Status: DISCONTINUED | OUTPATIENT
Start: 2023-11-07 | End: 2023-11-07

## 2023-11-07 RX ORDER — LIDOCAINE HYDROCHLORIDE 20 MG/ML
INJECTION, SOLUTION EPIDURAL; INFILTRATION; INTRACAUDAL; PERINEURAL AS NEEDED
Status: DISCONTINUED | OUTPATIENT
Start: 2023-11-07 | End: 2023-11-07

## 2023-11-07 RX ORDER — MIDAZOLAM HYDROCHLORIDE 1 MG/ML
INJECTION, SOLUTION INTRAMUSCULAR; INTRAVENOUS AS NEEDED
Status: DISCONTINUED | OUTPATIENT
Start: 2023-11-07 | End: 2023-11-07

## 2023-11-07 RX ORDER — GABAPENTIN 300 MG/1
600 CAPSULE ORAL ONCE
Status: COMPLETED | OUTPATIENT
Start: 2023-11-07 | End: 2023-11-07

## 2023-11-07 RX ORDER — ACETAMINOPHEN 325 MG/1
975 TABLET ORAL ONCE
Status: COMPLETED | OUTPATIENT
Start: 2023-11-07 | End: 2023-11-07

## 2023-11-07 RX ORDER — LABETALOL HYDROCHLORIDE 5 MG/ML
5 INJECTION, SOLUTION INTRAVENOUS ONCE AS NEEDED
Status: DISCONTINUED | OUTPATIENT
Start: 2023-11-07 | End: 2023-11-07 | Stop reason: HOSPADM

## 2023-11-07 RX ORDER — SODIUM CHLORIDE 0.9 G/100ML
IRRIGANT IRRIGATION AS NEEDED
Status: DISCONTINUED | OUTPATIENT
Start: 2023-11-07 | End: 2023-11-07 | Stop reason: HOSPADM

## 2023-11-07 RX ORDER — HYDROMORPHONE HYDROCHLORIDE 1 MG/ML
1 INJECTION, SOLUTION INTRAMUSCULAR; INTRAVENOUS; SUBCUTANEOUS EVERY 5 MIN PRN
Status: DISCONTINUED | OUTPATIENT
Start: 2023-11-07 | End: 2023-11-07 | Stop reason: HOSPADM

## 2023-11-07 RX ORDER — MIDAZOLAM HYDROCHLORIDE 1 MG/ML
1 INJECTION, SOLUTION INTRAMUSCULAR; INTRAVENOUS ONCE AS NEEDED
Status: DISCONTINUED | OUTPATIENT
Start: 2023-11-07 | End: 2023-11-07 | Stop reason: HOSPADM

## 2023-11-07 RX ORDER — SODIUM CHLORIDE 9 MG/ML
100 INJECTION, SOLUTION INTRAVENOUS CONTINUOUS
Status: DISCONTINUED | OUTPATIENT
Start: 2023-11-07 | End: 2023-11-08

## 2023-11-07 RX ADMIN — SODIUM CHLORIDE 100 ML/HR: 9 INJECTION, SOLUTION INTRAVENOUS at 21:10

## 2023-11-07 RX ADMIN — KETOROLAC TROMETHAMINE 15 MG: 30 INJECTION, SOLUTION INTRAMUSCULAR; INTRAVENOUS at 20:56

## 2023-11-07 RX ADMIN — Medication 100 MCG: at 14:21

## 2023-11-07 RX ADMIN — AMPICILLIN SODIUM AND SULBACTAM SODIUM 3 G: 2; 1 INJECTION, POWDER, FOR SOLUTION INTRAMUSCULAR; INTRAVENOUS at 20:00

## 2023-11-07 RX ADMIN — VANCOMYCIN HYDROCHLORIDE 1.25 G: 1.25 INJECTION, POWDER, LYOPHILIZED, FOR SOLUTION INTRAVENOUS at 03:08

## 2023-11-07 RX ADMIN — KETOROLAC TROMETHAMINE 15 MG: 30 INJECTION, SOLUTION INTRAMUSCULAR; INTRAVENOUS at 03:06

## 2023-11-07 RX ADMIN — Medication 100 MCG: at 15:07

## 2023-11-07 RX ADMIN — SENNOSIDES AND DOCUSATE SODIUM 2 TABLET: 8.6; 5 TABLET ORAL at 20:56

## 2023-11-07 RX ADMIN — ACETAMINOPHEN 975 MG: 325 TABLET ORAL at 20:56

## 2023-11-07 RX ADMIN — SODIUM CHLORIDE: 9 INJECTION, SOLUTION INTRAVENOUS at 14:07

## 2023-11-07 RX ADMIN — SODIUM CHLORIDE: 9 INJECTION, SOLUTION INTRAVENOUS at 15:34

## 2023-11-07 RX ADMIN — GENTAMICIN SULFATE 390 MG: 40 INJECTION, SOLUTION INTRAMUSCULAR; INTRAVENOUS at 14:22

## 2023-11-07 RX ADMIN — Medication 100 MCG: at 15:12

## 2023-11-07 RX ADMIN — CELECOXIB 400 MG: 100 CAPSULE ORAL at 13:28

## 2023-11-07 RX ADMIN — ACETAMINOPHEN 975 MG: 325 TABLET ORAL at 13:28

## 2023-11-07 RX ADMIN — Medication 100 MCG: at 15:25

## 2023-11-07 RX ADMIN — KETOROLAC TROMETHAMINE 30 MG: 30 INJECTION, SOLUTION INTRAMUSCULAR; INTRAVENOUS at 15:12

## 2023-11-07 RX ADMIN — PROPOFOL 200 MG: 10 INJECTION, EMULSION INTRAVENOUS at 14:14

## 2023-11-07 RX ADMIN — Medication 100 MCG: at 15:33

## 2023-11-07 RX ADMIN — FENTANYL CITRATE 100 MCG: 50 INJECTION, SOLUTION INTRAMUSCULAR; INTRAVENOUS at 14:14

## 2023-11-07 RX ADMIN — MIDAZOLAM 2 MG: 1 INJECTION INTRAMUSCULAR; INTRAVENOUS at 14:14

## 2023-11-07 RX ADMIN — LIDOCAINE HYDROCHLORIDE 100 MG: 20 INJECTION, SOLUTION EPIDURAL; INFILTRATION; INTRACAUDAL; PERINEURAL at 14:14

## 2023-11-07 RX ADMIN — ONDANSETRON 4 MG: 2 INJECTION INTRAMUSCULAR; INTRAVENOUS at 15:12

## 2023-11-07 RX ADMIN — GABAPENTIN 600 MG: 300 CAPSULE ORAL at 13:28

## 2023-11-07 SDOH — HEALTH STABILITY: MENTAL HEALTH: CURRENT SMOKER: 0

## 2023-11-07 ASSESSMENT — COGNITIVE AND FUNCTIONAL STATUS - GENERAL
MOBILITY SCORE: 24
DAILY ACTIVITIY SCORE: 24
MOBILITY SCORE: 18
MOVING TO AND FROM BED TO CHAIR: A LITTLE
MOVING FROM LYING ON BACK TO SITTING ON SIDE OF FLAT BED WITH BEDRAILS: A LITTLE
MOBILITY SCORE: 24
STANDING UP FROM CHAIR USING ARMS: A LITTLE
TURNING FROM BACK TO SIDE WHILE IN FLAT BAD: A LITTLE
WALKING IN HOSPITAL ROOM: A LITTLE
DAILY ACTIVITIY SCORE: 24
DAILY ACTIVITIY SCORE: 24
CLIMB 3 TO 5 STEPS WITH RAILING: A LITTLE

## 2023-11-07 ASSESSMENT — PAIN SCALES - GENERAL
PAINLEVEL_OUTOF10: 3
PAIN_LEVEL: 0
PAINLEVEL_OUTOF10: 0 - NO PAIN
PAINLEVEL_OUTOF10: 0 - NO PAIN
PAINLEVEL_OUTOF10: 2
PAINLEVEL_OUTOF10: 3
PAINLEVEL_OUTOF10: 0 - NO PAIN
PAINLEVEL_OUTOF10: 1
PAINLEVEL_OUTOF10: 3
PAINLEVEL_OUTOF10: 0 - NO PAIN

## 2023-11-07 ASSESSMENT — PAIN - FUNCTIONAL ASSESSMENT
PAIN_FUNCTIONAL_ASSESSMENT: 0-10

## 2023-11-07 NOTE — ANESTHESIA PREPROCEDURE EVALUATION
Patient: Damian Galdamez    Procedure Information       Date/Time: 11/07/23 1406    Procedure: EXAM UNDER  ANESTHESIA/ WOUND DEBRIDEMENT OF SURGICAL SITE / PLACEMENT OF WOUND VAC    Location: [facility] 09 / [facility]    Surgeons: Bladimir Castillo MD            Relevant Problems   Infectious Disease   (+) Wound infection       Clinical information reviewed:   Tobacco  Allergies  Meds   Med Hx  Surg Hx  OB Status  Fam Hx  Soc   Hx        NPO Detail:  NPO/Void Status  Date of Last Liquid: 11/06/23  Time of Last Liquid: 1800  Date of Last Solid: 11/06/23  Time of Last Solid: 2200         Physical Exam    Airway  Mallampati: I  TM distance: >3 FB  Neck ROM: full     Cardiovascular - normal exam  Rhythm: regular  Rate: normal     Dental - normal exam     Pulmonary - normal exam     Abdominal            Anesthesia Plan    ASA 1     general     The patient is not a current smoker.    intravenous induction   Postoperative administration of opioids is intended.  Trial extubation is planned.  Anesthetic plan and risks discussed with patient.    Plan discussed with CRNA.

## 2023-11-07 NOTE — CARE PLAN
Problem: Fall/Injury  Goal: Not fall by end of shift  Outcome: Progressing  Goal: Be free from injury by end of the shift  Outcome: Progressing  Goal: Verbalize understanding of personal risk factors for fall in the hospital  Outcome: Progressing  Goal: Verbalize understanding of risk factor reduction measures to prevent injury from fall in the home  Outcome: Progressing  Goal: Use assistive devices by end of the shift  Outcome: Progressing  Goal: Pace activities to prevent fatigue by end of the shift  Outcome: Progressing     Problem: Skin  Goal: Decreased wound size/increased tissue granulation at next dressing change  Outcome: Progressing  Goal: Participates in plan/prevention/treatment measures  Outcome: Progressing  Goal: Prevent/manage excess moisture  Outcome: Progressing  Goal: Prevent/minimize sheer/friction injuries  Outcome: Progressing  Goal: Promote/optimize nutrition  Outcome: Progressing  Goal: Promote skin healing  Outcome: Progressing     Problem: Pain - Adult  Goal: Verbalizes/displays adequate comfort level or baseline comfort level  Outcome: Progressing     Problem: Safety - Adult  Goal: Free from fall injury  Outcome: Progressing     Problem: Discharge Planning  Goal: Discharge to home or other facility with appropriate resources  Outcome: Progressing     Problem: Chronic Conditions and Co-morbidities  Goal: Patient's chronic conditions and co-morbidity symptoms are monitored and maintained or improved  Outcome: Progressing

## 2023-11-07 NOTE — PROGRESS NOTES
Damian Galdamez is a 23 y.o. adult on day 1 of admission presenting with Wound infection.    Attempted to contact patient at bedside. No answer. [facility] 24. Patient resides at home with his parent. Do not anticipate any needs but Care Transitions will continue to follow during course of hospital stay for any changes to discharge needs.  Yvette Barrett RN

## 2023-11-07 NOTE — ANESTHESIA POSTPROCEDURE EVALUATION
Patient: Damian Galdamez    Procedure Summary       Date: 11/07/23 Room / Location: [facility] 09 / [facility]    Anesthesia Start: 1407 Anesthesia Stop:     Procedure: EXAM UNDER  ANESTHESIA/ WOUND DEBRIDEMENT OF SURGICAL SITE / PLACEMENT OF WOUND VAC Diagnosis:       Gender dysphoria      (Gender dysphoria [F64.9])    Surgeons: Bladimir Castillo MD Responsible Provider: Leonel Sal MD    Anesthesia Type: general ASA Status: 1            Anesthesia Type: general    Vitals Value Taken Time   /74 11/07/23 1548   Temp 37.1 11/07/23 1548   Pulse 83 11/07/23 1548   Resp 16 11/07/23 1548   SpO2 100% 11/07/23 1547   Vitals shown include unvalidated device data.    Anesthesia Post Evaluation    Patient location during evaluation: bedside  Patient participation: complete - patient participated  Level of consciousness: awake and alert  Pain score: 0  Pain management: adequate  Airway patency: patent  Cardiovascular status: acceptable and blood pressure returned to baseline  Respiratory status: acceptable  Hydration status: acceptable        No notable events documented.

## 2023-11-07 NOTE — OP NOTE
EXAM UNDER  ANESTHESIA/ WOUND DEBRIDEMENT OF SURGICAL SITE / PLACEMENT OF WOUND VAC Operative Note     Date: 2023  OR Location: [facility]    Name: Damian Galdamez, : 2000, Age: 23 y.o., MRN: [id number], Sex: adult    Diagnosis  Pre-op Diagnosis     * Gender dysphoria [F64.9] Post-op Diagnosis     * Gender dysphoria [F64.9]     Procedures  Debridement of infected and adhesed penile wound.  Use of ultrasonic Masonic's debridement.  Placement of Kerecis  Wound VAC placement.    Surgeons      * Bladimir Castillo - Primary    Resident/Fellow/Other Assistant:  Surgeon(s) and Role:    Procedure Summary  Anesthesia: General  ASA: I  Anesthesia Staff: Anesthesiologist: Leonel Sal MD  CRNA: Ally Knight APRN-CRNA; Christen Herrera APRN-CRNA; Janet Oliveros APRN-CRNA  Estimated Blood Loss: 10mL  Intra-op Medications:   Medication Name Total Dose   sodium chloride 0.9 % irrigation solution 1,000 mL   gentamicin (Garamycin) 390 mg in dextrose 5 % in water (D5W) 100 mL  mg              Anesthesia Record               Intraprocedure I/O Totals          Intake    NaCl 0.9 % 1000.00 mL    Propofol Drip 20.00 mL    The total shown is the total volume documented since Anesthesia Start was filed.    gentamicin (Garamycin) 390 mg in dextrose 5 % in water (D5W) 100 mL .00 mL    Total Intake 1120 mL       Output    Est. Blood Loss 10 mL    Total Output 10 mL       Net    Net Volume 1110 mL          Specimen:   ID Type Source Tests Collected by Time   A : penile wound for culture Swab ABSCESS FUNGAL CULTURE/SMEAR, TISSUE/WOUND CULTURE/SMEAR Bladimir Castillo MD 2023 1445        Staff:   Circulator: Tiffanie Yang RN  Relief Circulator: Ofe Soria RN  Scrub Person: Lashaun Kendall; Scott Fernandes RN         Drains and/or Catheters: * None in log *    Tourniquet Times:         Implants:  Implants       Type Name Action Serial No.      Implant SURGICLOSE, OMEGA3, 126CM2 - [id number] Implanted               Findings:  More than 50% wound dehiscence of the undersurface of the phallus.  Adequate debridement of fibrinous exudate and purulent looking areas.  Adequate washout.  No areas of flap loss noted.    Indications: Damian Galdamez is an 23 y.o. adult who is having surgery for Gender dysphoria [F64.9].  He had an ALT phalloplasty that was single tube.  This was left open several weeks ago.  Last week, he came to the OR for closure of this area.  Day 5 after surgery, he presented with 2 wound dehiscence, in order.  He was admitted for IV antibiotics and a debridement and washout was recommended.  A wound VAC placement if possible was advised.    The patient was seen in the preoperative area. The risks, benefits, complications, treatment options, non-operative alternatives, expected recovery and outcomes were discussed with the patient. The possibilities of reaction to medication, pulmonary aspiration, injury to surrounding structures, bleeding, recurrent infection, the need for additional procedures, failure to diagnose a condition, and creating a complication requiring transfusion or operation were discussed with the patient. The patient concurred with the proposed plan, giving informed consent.  The site of surgery was properly noted/marked if necessary per policy. The patient has been actively warmed in preoperative area. Preoperative antibiotics have been ordered and given within 1 hours of incision. Venous thrombosis prophylaxis are not indicated.    Procedure Details:     Patient was brought to the operating suite, laid supine on the operating table.  Huddle was performed.  He was anesthetized and an LMA was placed.  He was placed in dorsolithotomy position and prepped and draped in standard sterile fashion.  His bladder was emptied with an in and out cath.  He we inspected the wound.  There was noted to be near-total dehiscence of the ventral surface of the phallus.  This extended to the left base of the phallus with a point  advancement flap that also dehisced.  We took some cultures prior to proceeding with the case.  We then did 2 rounds of washout with chlorhexidine and regular saline.  We then used my sonics ultrasonic debrider to debride any of the fibrinous exudate.  The wound edges and flap edges were freshened.  It was evident that probably would not be able to achieve closure.  We therefore performed a partial closure on the distal aspect of the phallus, as well as secured the base of the phallus flaps to the mons area.  This was done with interrupted nylon sutures.  We then placed Kerecis in the open wound.  We decided to put a wound VAC on.  A wound VAC sponge was fashioned to fill the defect.  DuoDERM was applied at the base to Cheeves adequate seal.  None circumferential strips of wound VAC tape were placed around the phallus.  It was connected to a suction and few areas of leak were addressed with extra reinforcement.  This concluded the procedure, patient tolerated the procedure well.  Complications:  None; patient tolerated the procedure well.    Disposition: PACU - hemodynamically stable.  Condition: stable         Additional Details: He will go to the floor for ongoing care.  Plan is for wound VAC for 5 to 6 days unless technical issues in which case the wound VAC may be changed out earlier.    Attending Attestation: I was present and scrubbed for the entire procedure.    Bladimir Castillo  Phone Number: [phone]

## 2023-11-07 NOTE — CARE PLAN
The patient's goals for the shift include get iv antibiotics    The clinical goals for the shift include infection control    Over the shift, the patient did not make progress toward the following goals. Barriers to progression include none. Recommendations to address these barriers include none.

## 2023-11-07 NOTE — CONSULTS
"Inpatient consult to Infectious Diseases  Consult performed by: Jorge Corey MD  Consult ordered by: Bladimir Castillo MD      Infectious Disease Initial Consult Note    Patient Name: Damian Galdamez   YOB: 2000    Subjective:  Damian Galdamez is a 23 y.o. adult with past medical history of gender dysphoria s/p ALT thigh phalloplasty 6 weeks ago and ventral closure not achieved due to flap tightness. Patient returned on 11/1 and underwent excision of scar tissue with partial closure of flap with kerecis mesh in place. Post-op course as expected and patient was discharged home. Patient returned to [facility] ER on Saturday 11/4 with new wound dehiscence. Patient was directly admitted from clinic for wound dehiscence and possible surgical site infection on 11/6. ID was consulted for possible surgical site infection.     On evaluation today, vitals stable, patient afebrile, -130s. Labs grossly unremarkable with no leukocytosis. Patient was given one dose of gentamicin and continued on vancomycin. Patient denies any subjective fevers or chills. He mentions that he felt nauseous intermittent during this process. He also endorses serosanguinous discharge from his wound site. He denies any trauma to the area since being discharged.     A 10 point ROS was completed and is negative expect as stated in HPI.     Past Medical History: As stated above, he mentions that the only medications that he takes is testosterone.    Past Surgical History: As stated above, double mastectomy (2018), partial hysterectomy    Social History: Social drinker, no tobacco use, occasional marijuana use, works as     Family History: Family history of lung cancer.    Objective:    /73 (BP Location: Left arm, Patient Position: Lying)   Pulse 72   Temp 36.9 °C (98.4 °F) (Temporal)   Resp 18   Ht 1.727 m (5' 7.99\")   Wt 78.5 kg (173 lb 1 oz)   SpO2 97%   BMI 26.32 kg/m²     Physical Exam:  GENERAL: Well " developed, A&Ox3, no distress, alert and cooperative  HEENT: Normocephalic, atraumatic, EOMI, clear sclera, MMM  CARDIOVASCULAR: Regular rate and rhythm, no murmurs, rubs, or gallops. S1/S2  RESPIRATORY: CTAB, no wheezes, rales or rhonchi. No distress  ABDOMEN: Soft, ND, non-tender. No rebound or guarding. BS+  EXTREMITIES: No peripheral edema, surgical changes from graft visible bilaterally  NEUROLOGICAL: A&O x3. CN II-XII grossly intact. No focal deficits  : Phallus in dressing, with yellow fluid appreciated.    Assessment/Plan:  Damian Galdamez is a 23 y.o. adult with past medical history of gender dysphoria s/p ALT thigh phalloplasty 6 weeks ago and ventral closure not achieved due to flap tightness. Patient returned on 11/1 and underwent excision of scar tissue with partial closure of flap with kerecis mesh in place. ID was consulted for possible surgical site infection.     Possible Surgical Site Infection    Wound culture from outside hospital 11/04:   Aerobic - 4+ enterococcus faecalis, 4+ pasteurella caris, 4+ klebsiella oxytoca, 4+ Citrobacter koseri, 4+ Ecoli, 4+ mixed skin cholo  Anaerobic - 4+ Prevotella bivia, 4+ Prevotella disiens    Given enterococcus and pasteurella growth will start patient on Unasyn. Continue with Vanc as well.  Recommend repeat wound cultures, advise to be taken during surgery.    Antibiotics: Vanc (day 2), Unasyn (day 1)    I have reviewed and interpreted all lab test imaging studies and documentations from other healthcare providers. Discussed antibiotics and potential side effects with patient.     This is a preliminary note, please await attending attestation for a finalized plan.    Jorge Corey MD  Infectious Disease

## 2023-11-07 NOTE — ANESTHESIA PROCEDURE NOTES
Airway  Date/Time: 11/7/2023 2:17 PM  Urgency: elective    Airway not difficult    Staffing  Performed: CRNA   Authorized by: Leonel aSl MD    Performed by: WENDY Yu  Patient location during procedure: OR    Indications and Patient Condition  Indications for airway management: anesthesia  Spontaneous ventilation: present  Sedation level: deep  Preoxygenated: yes  Patient position: sniffing  Mask difficulty assessment: 1 - vent by mask    Final Airway Details  Final airway type: supraglottic airway      Successful airway: Size 4     Number of attempts at approach: 1  Ventilation between attempts: none  Number of other approaches attempted: 0

## 2023-11-07 NOTE — PROGRESS NOTES
Vancomycin Dosing by Pharmacy- INITIAL    Damian Galdamez is a 23 y.o. year old adult who Pharmacy has been consulted for vancomycin dosing for surgical site infection. Based on the patient's indication and renal status this patient will be dosed based on a goal AUC of 400-600.     Renal function is currently stable. Scr has been trending up slightly, but appears to be around baseline.     Visit Vitals  /71   Pulse 72   Temp 36.8 °C (98.2 °F) (Temporal)   Resp 18        Lab Results   Component Value Date    CREATININE 1.07 11/07/2023    CREATININE 0.94 11/06/2023    CREATININE 0.83 11/02/2023    CREATININE 0.68 11/01/2023      Lab Results   Component Value Date    URINECULTURE NO SIGNIFICANT GROWTH. 08/08/2023     I/O last 3 completed shifts:  In: 590 [P.O.:240; IV Piggyback:350]  Out: -       Assessment/Plan     Patient was started on vancomycin 1250 mg IV q12h x 3 doses. Will continue this same regimen.     This dosing regimen is predicted by [employer] to result in the following pharmacokinetic parameters:  AUC24,ss: 563 mg/L.hr  Probability of AUC24 > 400: 82 %  Ctrough,ss: 17.1 mg/L  Probability of Ctrough,ss > 20: 38 %  Probability of nephrotoxicity (Lodise ROSEMARY 2009): 13 %    Follow-up level will be ordered on 11/8 with mid-AM labs unless clinically indicated sooner.  Will continue to monitor renal function daily while on vancomycin and order serum creatinine at least every 48 hours if not already ordered.  Obtained culture results from 11/4 at [facility]. Blood cultures are NGTD; wound culture grew multiple organisms, including E. Faecalis, Pasturella, Prevotella, Klebsiella oxytoca, and Citrobacter. Sensitivities were not run on the wound culture given the multiple organisms. A paper copy has been added to the patient's paper chart.    Follow for continued vancomycin needs, clinical response, and signs/symptoms of toxicity.     Please call with any questions.  Nori Townsend, PharmD, BCCCP  [phone]

## 2023-11-08 LAB
ANION GAP SERPL CALC-SCNC: 9 MMOL/L (ref 10–20)
BUN SERPL-MCNC: 17 MG/DL (ref 6–23)
CALCIUM SERPL-MCNC: 8.5 MG/DL (ref 8.6–10.3)
CHLORIDE SERPL-SCNC: 108 MMOL/L (ref 98–107)
CO2 SERPL-SCNC: 25 MMOL/L (ref 21–32)
CREAT SERPL-MCNC: 0.75 MG/DL (ref 0.5–1.3)
ERYTHROCYTE [DISTWIDTH] IN BLOOD BY AUTOMATED COUNT: 12.2 % (ref 11.5–14.5)
GFR SERPL CREATININE-BSD FRML MDRD: >90 ML/MIN/1.73M*2
GLUCOSE SERPL-MCNC: 103 MG/DL (ref 74–99)
HCT VFR BLD AUTO: 35.5 % (ref 36–52)
HGB BLD-MCNC: 11.8 G/DL (ref 12–17.5)
MCH RBC QN AUTO: 30.3 PG (ref 26–34)
MCHC RBC AUTO-ENTMCNC: 33.2 G/DL (ref 32–36)
MCV RBC AUTO: 91 FL (ref 80–100)
NRBC BLD-RTO: 0 /100 WBCS (ref 0–0)
PLATELET # BLD AUTO: 226 X10*3/UL (ref 150–450)
POTASSIUM SERPL-SCNC: 4.3 MMOL/L (ref 3.5–5.3)
RBC # BLD AUTO: 3.89 X10*6/UL (ref 4–5.9)
SODIUM SERPL-SCNC: 138 MMOL/L (ref 136–145)
WBC # BLD AUTO: 10.5 X10*3/UL (ref 4.4–11.3)

## 2023-11-08 PROCEDURE — 2500000004 HC RX 250 GENERAL PHARMACY W/ HCPCS (ALT 636 FOR OP/ED)

## 2023-11-08 PROCEDURE — 1100000001 HC PRIVATE ROOM DAILY

## 2023-11-08 PROCEDURE — 2500000001 HC RX 250 WO HCPCS SELF ADMINISTERED DRUGS (ALT 637 FOR MEDICARE OP): Performed by: NURSE PRACTITIONER

## 2023-11-08 PROCEDURE — 85027 COMPLETE CBC AUTOMATED: CPT | Performed by: NURSE PRACTITIONER

## 2023-11-08 PROCEDURE — 36415 COLL VENOUS BLD VENIPUNCTURE: CPT | Performed by: NURSE PRACTITIONER

## 2023-11-08 PROCEDURE — 96372 THER/PROPH/DIAG INJ SC/IM: CPT | Performed by: NURSE PRACTITIONER

## 2023-11-08 PROCEDURE — 99232 SBSQ HOSP IP/OBS MODERATE 35: CPT | Performed by: NURSE PRACTITIONER

## 2023-11-08 PROCEDURE — 2500000004 HC RX 250 GENERAL PHARMACY W/ HCPCS (ALT 636 FOR OP/ED): Performed by: NURSE PRACTITIONER

## 2023-11-08 PROCEDURE — 80048 BASIC METABOLIC PNL TOTAL CA: CPT | Performed by: NURSE PRACTITIONER

## 2023-11-08 RX ORDER — IBUPROFEN 600 MG/1
600 TABLET ORAL EVERY 6 HOURS SCHEDULED
Status: DISCONTINUED | OUTPATIENT
Start: 2023-11-09 | End: 2023-11-13 | Stop reason: HOSPADM

## 2023-11-08 RX ADMIN — ACETAMINOPHEN 975 MG: 325 TABLET ORAL at 21:14

## 2023-11-08 RX ADMIN — SENNOSIDES AND DOCUSATE SODIUM 2 TABLET: 8.6; 5 TABLET ORAL at 21:15

## 2023-11-08 RX ADMIN — AMPICILLIN SODIUM AND SULBACTAM SODIUM 3 G: 2; 1 INJECTION, POWDER, FOR SOLUTION INTRAMUSCULAR; INTRAVENOUS at 13:01

## 2023-11-08 RX ADMIN — ONDANSETRON 4 MG: 2 INJECTION INTRAMUSCULAR; INTRAVENOUS at 07:25

## 2023-11-08 RX ADMIN — ACETAMINOPHEN 975 MG: 325 TABLET ORAL at 07:20

## 2023-11-08 RX ADMIN — AMPICILLIN SODIUM AND SULBACTAM SODIUM 3 G: 2; 1 INJECTION, POWDER, FOR SOLUTION INTRAMUSCULAR; INTRAVENOUS at 21:15

## 2023-11-08 RX ADMIN — ACETAMINOPHEN 975 MG: 325 TABLET ORAL at 13:01

## 2023-11-08 RX ADMIN — KETOROLAC TROMETHAMINE 15 MG: 30 INJECTION, SOLUTION INTRAMUSCULAR; INTRAVENOUS at 21:14

## 2023-11-08 RX ADMIN — VANCOMYCIN HYDROCHLORIDE 1.25 G: 1.25 INJECTION, POWDER, LYOPHILIZED, FOR SOLUTION INTRAVENOUS at 14:14

## 2023-11-08 RX ADMIN — AMPICILLIN SODIUM AND SULBACTAM SODIUM 3 G: 2; 1 INJECTION, POWDER, FOR SOLUTION INTRAMUSCULAR; INTRAVENOUS at 07:20

## 2023-11-08 RX ADMIN — KETOROLAC TROMETHAMINE 15 MG: 30 INJECTION, SOLUTION INTRAMUSCULAR; INTRAVENOUS at 08:26

## 2023-11-08 RX ADMIN — ENOXAPARIN SODIUM 40 MG: 100 INJECTION SUBCUTANEOUS at 13:00

## 2023-11-08 RX ADMIN — VANCOMYCIN HYDROCHLORIDE 1.25 G: 1.25 INJECTION, POWDER, LYOPHILIZED, FOR SOLUTION INTRAVENOUS at 04:06

## 2023-11-08 RX ADMIN — ACETAMINOPHEN 975 MG: 325 TABLET ORAL at 02:05

## 2023-11-08 RX ADMIN — SENNOSIDES AND DOCUSATE SODIUM 2 TABLET: 8.6; 5 TABLET ORAL at 08:26

## 2023-11-08 RX ADMIN — AMPICILLIN SODIUM AND SULBACTAM SODIUM 3 G: 2; 1 INJECTION, POWDER, FOR SOLUTION INTRAMUSCULAR; INTRAVENOUS at 02:05

## 2023-11-08 RX ADMIN — KETOROLAC TROMETHAMINE 15 MG: 30 INJECTION, SOLUTION INTRAMUSCULAR; INTRAVENOUS at 04:06

## 2023-11-08 ASSESSMENT — COGNITIVE AND FUNCTIONAL STATUS - GENERAL
DAILY ACTIVITIY SCORE: 24
MOBILITY SCORE: 24

## 2023-11-08 ASSESSMENT — PAIN SCALES - GENERAL
PAINLEVEL_OUTOF10: 0 - NO PAIN
PAINLEVEL_OUTOF10: 0 - NO PAIN

## 2023-11-08 NOTE — CARE PLAN
Problem: Fall/Injury  Goal: Not fall by end of shift  11/8/2023 0749 by Lindy Mehta RN  Outcome: Progressing  11/8/2023 0746 by Lindy Mehta RN  Outcome: Progressing  Goal: Be free from injury by end of the shift  11/8/2023 0749 by Lindy Mehta RN  Outcome: Progressing  11/8/2023 0746 by Lindy Mehta RN  Outcome: Progressing  Goal: Verbalize understanding of personal risk factors for fall in the hospital  11/8/2023 0749 by Lindy Mehta RN  Outcome: Progressing  11/8/2023 0746 by Lindy Mehta RN  Outcome: Progressing  Goal: Verbalize understanding of risk factor reduction measures to prevent injury from fall in the home  11/8/2023 0749 by Lindy Mehta RN  Outcome: Progressing  11/8/2023 0746 by Lindy Mehta RN  Outcome: Progressing  Goal: Use assistive devices by end of the shift  11/8/2023 0749 by Lindy Mehta RN  Outcome: Progressing  11/8/2023 0746 by Lindy Mehta RN  Outcome: Progressing  Goal: Pace activities to prevent fatigue by end of the shift  11/8/2023 0749 by Lindy Mehta RN  Outcome: Progressing  11/8/2023 0746 by Lindy Mehta RN  Outcome: Progressing     Problem: Skin  Goal: Decreased wound size/increased tissue granulation at next dressing change  11/8/2023 0749 by Lindy eMhta RN  Outcome: Progressing  11/8/2023 0746 by Lindy Mehta RN  Outcome: Progressing  Goal: Participates in plan/prevention/treatment measures  11/8/2023 0749 by Lindy Mehta RN  Outcome: Progressing  11/8/2023 0746 by Lindy Mehta RN  Outcome: Progressing  Goal: Prevent/manage excess moisture  11/8/2023 0749 by Linyd Mehta RN  Outcome: Progressing  11/8/2023 0746 by Lindy Mehta RN  Outcome: Progressing  Goal: Prevent/minimize sheer/friction injuries  11/8/2023 0749 by Lindy Mehta RN  Outcome: Progressing  11/8/2023 0746 by Lindy Mehta RN  Outcome: Progressing  Goal: Promote/optimize nutrition  11/8/2023 0749 by Lindy Mehta RN  Outcome: Progressing  11/8/2023 0746 by Lindy Mehta,  RN  Outcome: Progressing  Goal: Promote skin healing  11/8/2023 0749 by Lindy Mehta RN  Outcome: Progressing  11/8/2023 0746 by Lindy Mehta RN  Outcome: Progressing     Problem: Pain - Adult  Goal: Verbalizes/displays adequate comfort level or baseline comfort level  11/8/2023 0749 by Lindy Mehta RN  Outcome: Progressing  11/8/2023 0746 by Lindy Mehta RN  Outcome: Progressing     Problem: Safety - Adult  Goal: Free from fall injury  11/8/2023 0749 by Lindy Mehta RN  Outcome: Progressing  11/8/2023 0746 by Lindy Mehta RN  Outcome: Progressing     Problem: Discharge Planning  Goal: Discharge to home or other facility with appropriate resources  11/8/2023 0749 by Lindy Mehta RN  Outcome: Progressing  11/8/2023 0746 by Lindy Mehta RN  Outcome: Progressing     Problem: Chronic Conditions and Co-morbidities  Goal: Patient's chronic conditions and co-morbidity symptoms are monitored and maintained or improved  11/8/2023 0749 by Lindy Mehta RN  Outcome: Progressing  11/8/2023 0746 by Lindy Mehta RN  Outcome: Progressing   The patient's goals for the shift include or/iv atb    The clinical goals for the shift include patient will rate pain as <4 on a scale from 0-10 by end of shift

## 2023-11-08 NOTE — CARE PLAN
Problem: Fall/Injury  Goal: Not fall by end of shift  Outcome: Progressing  Goal: Be free from injury by end of the shift  Outcome: Progressing  Goal: Verbalize understanding of personal risk factors for fall in the hospital  Outcome: Progressing  Goal: Verbalize understanding of risk factor reduction measures to prevent injury from fall in the home  Outcome: Progressing  Goal: Use assistive devices by end of the shift  Outcome: Progressing  Goal: Pace activities to prevent fatigue by end of the shift  Outcome: Progressing     Problem: Skin  Goal: Decreased wound size/increased tissue granulation at next dressing change  Outcome: Progressing  Goal: Participates in plan/prevention/treatment measures  Outcome: Progressing  Goal: Prevent/manage excess moisture  Outcome: Progressing  Goal: Prevent/minimize sheer/friction injuries  Outcome: Progressing  Goal: Promote/optimize nutrition  Outcome: Progressing  Goal: Promote skin healing  Outcome: Progressing     Problem: Pain - Adult  Goal: Verbalizes/displays adequate comfort level or baseline comfort level  Outcome: Progressing     Problem: Safety - Adult  Goal: Free from fall injury  Outcome: Progressing     Problem: Discharge Planning  Goal: Discharge to home or other facility with appropriate resources  Outcome: Progressing     Problem: Chronic Conditions and Co-morbidities  Goal: Patient's chronic conditions and co-morbidity symptoms are monitored and maintained or improved  Outcome: Progressing    The clinical goals for the shift include patient will rate pain as <4 on a scale from 0-10 by end of shift    Patient is progressing well, states minimal pain but denies need for pain interventions, no significant events overnight, patient resting with call light within reach at this time

## 2023-11-08 NOTE — PROGRESS NOTES
Vancomycin Dosing by Pharmacy- FOLLOW UP    Damian Galdamez is a 23 y.o. year old adult who Pharmacy has been consulted for vancomycin dosing for surgical site infection. Based on the patient's indication and renal status this patient is being dosed based on a goal AUC of 400-600.     Renal function is currently stable.    Current vancomycin dose: 1250 mg given every 12 hours    Estimated vancomycin AUC on current dose: 464 mg/L.hr     Visit Vitals  /55 (BP Location: Right arm, Patient Position: Lying)   Pulse 67   Temp 37.1 °C (98.8 °F) (Temporal)   Resp 16        Lab Results   Component Value Date    CREATININE 0.75 11/08/2023    CREATININE 1.07 11/07/2023    CREATININE 0.94 11/06/2023    CREATININE 0.83 11/02/2023      Lab Results   Component Value Date    URINECULTURE NO SIGNIFICANT GROWTH. 08/08/2023     I/O last 3 completed shifts:  In: 2540 (32.4 mL/kg) [I.V.:990 (12.6 mL/kg); IV Piggyback:1550]  Out: 160 (2 mL/kg) [Urine:150 (0.1 mL/kg/hr); Blood:10]  Weight: 78.5 kg       Assessment/Plan    Day #3 of vancomycin therapy.   Vancomycin level was accidentally canceled this AM, and therefore, was not drawn today as planned. Level has been reordered for tomorrow with mid-AM labs.   Will continue current dose at this time.   Will continue to monitor renal function daily while on vancomycin and order serum creatinine at least every 48 hours if not already ordered.  Follow for continued vancomycin needs, clinical response, and signs/symptoms of toxicity.     Please call with any questions.  Nori Townsend, PharmD, BCCCP  [id number]           vomiting/dehydration

## 2023-11-08 NOTE — PROGRESS NOTES
Damian Galdamez is a 23 y.o. adult on day 2 of admission presenting with Wound infection.    11/8 update per provider note: Dispo: Home, 11/13 with plan to takedown wound VAC this day     Patient had wound vac placed. Awaiting to determine if he will be required wound vac a at discharge. Message sent to giorgio. Care Transitions will continue to follow during course of hospital stay for any changes to discharge needs.  Yvette Barrett RN

## 2023-11-08 NOTE — CARE PLAN
Problem: Fall/Injury  Goal: Not fall by end of shift  Outcome: Progressing  Goal: Be free from injury by end of the shift  Outcome: Progressing  Goal: Verbalize understanding of personal risk factors for fall in the hospital  Outcome: Progressing  Goal: Verbalize understanding of risk factor reduction measures to prevent injury from fall in the home  Outcome: Progressing  Goal: Use assistive devices by end of the shift  Outcome: Progressing  Goal: Pace activities to prevent fatigue by end of the shift  Outcome: Progressing     Problem: Skin  Goal: Decreased wound size/increased tissue granulation at next dressing change  Outcome: Progressing  Goal: Participates in plan/prevention/treatment measures  Outcome: Progressing  Goal: Prevent/manage excess moisture  Outcome: Progressing  Goal: Prevent/minimize sheer/friction injuries  Outcome: Progressing  Goal: Promote/optimize nutrition  Outcome: Progressing  Goal: Promote skin healing  Outcome: Progressing     Problem: Pain - Adult  Goal: Verbalizes/displays adequate comfort level or baseline comfort level  Outcome: Progressing     Problem: Safety - Adult  Goal: Free from fall injury  Outcome: Progressing     Problem: Discharge Planning  Goal: Discharge to home or other facility with appropriate resources  Outcome: Progressing     Problem: Chronic Conditions and Co-morbidities  Goal: Patient's chronic conditions and co-morbidity symptoms are monitored and maintained or improved  Outcome: Progressing   The patient's goals for the shift include or/iv atb    The clinical goals for the shift include patient will rate pain as <4 on a scale from 0-10 by end of shift

## 2023-11-08 NOTE — PROGRESS NOTES
"Infectious Disease Progress Note    Patient Name: Damian Galdamez   YOB: 2000    Subjective:  Patient doing well. Denies any fevers, chills, chest pain, nausea or vomiting.    Objective:    /55 (BP Location: Right arm, Patient Position: Lying)   Pulse 68   Temp 37.2 °C (99 °F) (Temporal)   Resp 16   Ht 1.727 m (5' 7.99\")   Wt 78.5 kg (173 lb 1 oz)   SpO2 97%   BMI 26.32 kg/m²     Physical Exam:  GENERAL: Well developed, A&Ox3, no distress, alert and cooperative  HEENT: Normocephalic, atraumatic, EOMI, clear sclera, MMM  CARDIOVASCULAR: Regular rate and rhythm, no murmurs, rubs, or gallops. S1/S2  RESPIRATORY: CTAB, no wheezes, rales or rhonchi. No distress  ABDOMEN: Soft, ND, non-tender. No rebound or guarding. BS+  EXTREMITIES: No peripheral edema, surgical changes from graft visible bilaterally  NEUROLOGICAL: A&O x3. CN II-XII grossly intact. No focal deficits  : Phallus in dressing      Assessment/Plan:  Damian Galdamez is a 23 y.o. adult with past medical history of gender dysphoria s/p ALT thigh phalloplasty 6 weeks ago and ventral closure not achieved due to flap tightness. Patient returned on 11/1 and underwent excision of scar tissue with partial closure of flap with kerecis mesh in place. ID was consulted for possible surgical site infection.      Possible Surgical Site Infection     Wound culture from outside hospital 11/04:   Aerobic - 4+ enterococcus faecalis, 4+ pasteurella caris, 4+ klebsiella oxytoca, 4+ Citrobacter koseri, 4+ Ecoli, 4+ mixed skin cholo  Anaerobic - 4+ Prevotella bivia, 4+ Prevotella disiens     Given enterococcus and pasteurella growth continue with current antibiotics. Will consider de-escalating and titrating with surgical culture results.  Advised patient on infection prevention when going home. Patient will not be living in the same house at discharge (previously lived with 2 dogs). Advised patient on wound cleanser as patient will not be able to get " wound wet.      Antibiotics: Vanc (day 3), Unasyn (day 2)     I have reviewed and interpreted all lab test imaging studies and documentations from other healthcare providers. Discussed antibiotics and potential side effects with patient.      This is a preliminary note, please await attending attestation for a finalized plan.     Jorge Corey MD  Infectious Disease

## 2023-11-08 NOTE — PROGRESS NOTES
"Damian Galdamez is a 23 y.o. adult on day 2 of admission presenting with Wound infection.    Subjective   No acute events overnight. Nausea earlier this morning but resolved on own. Pain is well controlled. Tolerating a regular diet. Passing flatus and having bowel movements. No issues with wound VAC. Resting comfortably in bed. He is nervous to get out of bed because he does not want to disrupt the wound VAC.        Objective     Physical Exam  Constitutional:       Appearance: Normal appearance.   HENT:      Mouth/Throat:      Mouth: Mucous membranes are moist.   Cardiovascular:      Rate and Rhythm: Normal rate and regular rhythm.   Pulmonary:      Effort: Pulmonary effort is normal.      Breath sounds: Normal breath sounds.   Abdominal:      Palpations: Abdomen is soft.   Genitourinary:     Comments: Wound VAC to ventral aspect of neophallus, intact at 125mmHg negative pressure continuous. Neophallus is warm to touch, good cap refill, normal skin tone  Skin:     General: Skin is warm and dry.   Neurological:      Mental Status: He is alert and oriented to person, place, and time. Mental status is at baseline.   Psychiatric:         Mood and Affect: Mood normal.         Behavior: Behavior normal.         Last Recorded Vitals  Blood pressure 120/55, pulse 68, temperature 37.2 °C (99 °F), resp. rate 16, height 1.727 m (5' 7.99\"), weight 78.5 kg (173 lb 1 oz), SpO2 97 %.  Intake/Output last 3 Shifts:  I/O last 3 completed shifts:  In: 2540 (32.4 mL/kg) [I.V.:990 (12.6 mL/kg); IV Piggyback:1550]  Out: 160 (2 mL/kg) [Urine:150 (0.1 mL/kg/hr); Blood:10]  Weight: 78.5 kg     Relevant Results  Scheduled medications  acetaminophen, 975 mg, oral, q6h  ampicillin-sulbactam, 3 g, intravenous, q6h  enoxaparin, 40 mg, subcutaneous, q24h  ketorolac, 15 mg, intravenous, q6h  sennosides-docusate sodium, 2 tablet, oral, BID  vancomycin, 1,250 mg, intravenous, q12h      Continuous medications  sodium chloride 0.9%, 100 mL/hr, Last " Rate: 100 mL/hr (11/08/23 0230)      PRN medications  PRN medications: ondansetron **OR** ondansetron, oxyCODONE, oxyCODONE    Results for orders placed or performed during the hospital encounter of 11/06/23 (from the past 24 hour(s))   CBC   Result Value Ref Range    WBC 10.5 4.4 - 11.3 x10*3/uL    nRBC 0.0 0.0 - 0.0 /100 WBCs    RBC 3.89 (L) 4.00 - 5.90 x10*6/uL    Hemoglobin 11.8 (L) 12.0 - 17.5 g/dL    Hematocrit 35.5 (L) 36.0 - 52.0 %    MCV 91 80 - 100 fL    MCH 30.3 26.0 - 34.0 pg    MCHC 33.2 32.0 - 36.0 g/dL    RDW 12.2 11.5 - 14.5 %    Platelets 226 150 - 450 x10*3/uL   Basic Metabolic Panel   Result Value Ref Range    Glucose 103 (H) 74 - 99 mg/dL    Sodium 138 136 - 145 mmol/L    Potassium 4.3 3.5 - 5.3 mmol/L    Chloride 108 (H) 98 - 107 mmol/L    Bicarbonate 25 21 - 32 mmol/L    Anion Gap 9 (L) 10 - 20 mmol/L    Urea Nitrogen 17 6 - 23 mg/dL    Creatinine 0.75 0.50 - 1.30 mg/dL    eGFR >90 >60 mL/min/1.73m*2    Calcium 8.5 (L) 8.6 - 10.3 mg/dL       No results found.                           Assessment/Plan   Principal Problem:    Wound infection    22 yo male with gender dysphoria s/p ALT thigh phalloplasty 6 weeks ago and ventral closure not achieved due to flap tightness. He returned on 11/1 and underwent excision of scar tissue with partial closure of flap with Kerecis mesh in place. Post-operative course as expected and patient was discharged home with incision care supplies and instructions. He returned to [facility] ER on Saturday with new wound dehiscence who deferred care to us. He was directly admitted from clinic 11/7 for wound dehiscence and  surgical site infection. He is POD 1 for debridement of infected and adhesed penile wound, use of ultrasonic Masonic's debridement, placement of Kerecis and wound VAC with Dr. Castillo.     Neophallus with intact wound VAC, cannister <1/4 full. Neophallus warm to touch, good cap refill (<3 sec), normal skin tone.     - Continue Vancomycin and Unasyn  per ID recommendations  > Waiting on final cultures from surgery  - Maintain wound VAC for 5-6 days  - Multimodal pain management  - Regular diet  > Discontinue IV fluids, tolerating PO intake  - Continue bowel regimen  - OOB as tolerated    PPX: SCDs and subcutaneous Lovenox    Dispo: Home, 11/13 with plan to takedown wound VAC this day       I spent 30 minutes in the professional and overall care of this patient.      Dee Dee Wahl, APRN-CNP

## 2023-11-09 LAB
HOLD SPECIMEN: NORMAL
VANCOMYCIN SERPL-MCNC: 17.2 UG/ML (ref 5–20)

## 2023-11-09 PROCEDURE — 80202 ASSAY OF VANCOMYCIN: CPT | Performed by: INTERNAL MEDICINE

## 2023-11-09 PROCEDURE — 96372 THER/PROPH/DIAG INJ SC/IM: CPT | Performed by: NURSE PRACTITIONER

## 2023-11-09 PROCEDURE — 2500000004 HC RX 250 GENERAL PHARMACY W/ HCPCS (ALT 636 FOR OP/ED)

## 2023-11-09 PROCEDURE — 2500000005 HC RX 250 GENERAL PHARMACY W/O HCPCS: Performed by: NURSE PRACTITIONER

## 2023-11-09 PROCEDURE — 1100000001 HC PRIVATE ROOM DAILY

## 2023-11-09 PROCEDURE — 2500000004 HC RX 250 GENERAL PHARMACY W/ HCPCS (ALT 636 FOR OP/ED): Performed by: NURSE PRACTITIONER

## 2023-11-09 PROCEDURE — 36415 COLL VENOUS BLD VENIPUNCTURE: CPT | Performed by: INTERNAL MEDICINE

## 2023-11-09 PROCEDURE — 2500000001 HC RX 250 WO HCPCS SELF ADMINISTERED DRUGS (ALT 637 FOR MEDICARE OP): Performed by: NURSE PRACTITIONER

## 2023-11-09 PROCEDURE — 99232 SBSQ HOSP IP/OBS MODERATE 35: CPT | Performed by: NURSE PRACTITIONER

## 2023-11-09 RX ADMIN — ACETAMINOPHEN 975 MG: 325 TABLET ORAL at 14:16

## 2023-11-09 RX ADMIN — AMPICILLIN SODIUM AND SULBACTAM SODIUM 3 G: 2; 1 INJECTION, POWDER, FOR SOLUTION INTRAMUSCULAR; INTRAVENOUS at 23:26

## 2023-11-09 RX ADMIN — ONDANSETRON HYDROCHLORIDE 4 MG: 4 TABLET, FILM COATED ORAL at 03:24

## 2023-11-09 RX ADMIN — VANCOMYCIN HYDROCHLORIDE 1.25 G: 1.25 INJECTION, POWDER, LYOPHILIZED, FOR SOLUTION INTRAVENOUS at 16:36

## 2023-11-09 RX ADMIN — AMPICILLIN SODIUM AND SULBACTAM SODIUM 3 G: 2; 1 INJECTION, POWDER, FOR SOLUTION INTRAMUSCULAR; INTRAVENOUS at 09:12

## 2023-11-09 RX ADMIN — SENNOSIDES AND DOCUSATE SODIUM 2 TABLET: 8.6; 5 TABLET ORAL at 09:12

## 2023-11-09 RX ADMIN — AMPICILLIN SODIUM AND SULBACTAM SODIUM 3 G: 2; 1 INJECTION, POWDER, FOR SOLUTION INTRAMUSCULAR; INTRAVENOUS at 16:37

## 2023-11-09 RX ADMIN — ACETAMINOPHEN 975 MG: 325 TABLET ORAL at 09:12

## 2023-11-09 RX ADMIN — ENOXAPARIN SODIUM 40 MG: 100 INJECTION SUBCUTANEOUS at 14:18

## 2023-11-09 RX ADMIN — ACETAMINOPHEN 975 MG: 325 TABLET ORAL at 03:24

## 2023-11-09 RX ADMIN — AMPICILLIN SODIUM AND SULBACTAM SODIUM 3 G: 2; 1 INJECTION, POWDER, FOR SOLUTION INTRAMUSCULAR; INTRAVENOUS at 03:24

## 2023-11-09 RX ADMIN — VANCOMYCIN HYDROCHLORIDE 1.25 G: 1.25 INJECTION, POWDER, LYOPHILIZED, FOR SOLUTION INTRAVENOUS at 05:11

## 2023-11-09 ASSESSMENT — COGNITIVE AND FUNCTIONAL STATUS - GENERAL
MOBILITY SCORE: 24
MOBILITY SCORE: 24
DAILY ACTIVITIY SCORE: 24
DAILY ACTIVITIY SCORE: 24

## 2023-11-09 ASSESSMENT — PAIN SCALES - GENERAL
PAINLEVEL_OUTOF10: 0 - NO PAIN
PAINLEVEL_OUTOF10: 0 - NO PAIN

## 2023-11-09 NOTE — PROGRESS NOTES
"Infectious Disease Progress Note    Patient Name: Damian Galdamez   YOB: 2000    Subjective:  Patient doing well. Denies any fevers, chills, chest pain, nausea or vomiting.    Objective:    /66 (BP Location: Left arm, Patient Position: Lying)   Pulse 63   Temp 36.7 °C (98.1 °F) (Temporal)   Resp 18   Ht 1.727 m (5' 7.99\")   Wt 78.5 kg (173 lb 1 oz)   SpO2 98%   BMI 26.32 kg/m²     Physical Exam:  GENERAL: Well developed, A&Ox3, no distress, alert and cooperative  HEENT: Normocephalic, atraumatic, EOMI, clear sclera, MMM  CARDIOVASCULAR: Regular rate and rhythm, no murmurs, rubs, or gallops. S1/S2  RESPIRATORY: CTAB, no wheezes, rales or rhonchi. No distress  ABDOMEN: Soft, ND, non-tender. No rebound or guarding. BS+  EXTREMITIES: No peripheral edema, surgical changes from graft visible bilaterally  NEUROLOGICAL: A&O x3. CN II-XII grossly intact. No focal deficits  : Phallus in dressing      Assessment/Plan:  Damian Galdamez is a 23 y.o. adult with past medical history of gender dysphoria s/p ALT thigh phalloplasty 6 weeks ago and ventral closure not achieved due to flap tightness. Patient returned on 11/1 and underwent excision of scar tissue with partial closure of flap with kerecis mesh in place. ID was consulted for possible surgical site infection.      Possible Surgical Site Infection     Wound culture from outside hospital 11/04:   Aerobic - 4+ enterococcus faecalis, 4+ pasteurella caris, 4+ klebsiella oxytoca, 4+ Citrobacter koseri, 4+ Ecoli, 4+ mixed skin cholo  Anaerobic - 4+ Prevotella bivia, 4+ Prevotella disiens     Given enterococcus and pasteurella growth continue with current antibiotics. Will consider de-escalating and titrating with surgical culture results. Current results showing mixed gram + and gram -.   Advised patient on infection prevention when going home. Patient will not be living in the same house at discharge (previously lived with 2 dogs). Advised patient on " wound cleanser as patient will not be able to get wound wet.      Antibiotics: Vanc (day 4), Unasyn (day 3)     I have reviewed and interpreted all lab test imaging studies and documentations from other healthcare providers. Discussed antibiotics and potential side effects with patient.      This is a preliminary note, please await attending attestation for a finalized plan.     Jorge Corey MD  Infectious Disease

## 2023-11-09 NOTE — PROGRESS NOTES
Vancomycin Dosing by Pharmacy- FOLLOW UP    Damian Galdamez is a 23 y.o. year old adult who Pharmacy has been consulted for vancomycin dosing for cellulitis, skin and soft tissue. Based on the patient's indication and renal status this patient is being dosed based on a goal AUC of 400-600.     Renal function is currently stable.    Current vancomycin dose: 1250 mg given every 12 hours    Estimated vancomycin AUC on current dose: 429 mg/L.hr     Visit Vitals  /66 (BP Location: Left arm, Patient Position: Lying)   Pulse 63   Temp 36.7 °C (98.1 °F) (Temporal)   Resp 18        Lab Results   Component Value Date    CREATININE 0.75 11/08/2023    CREATININE 1.07 11/07/2023    CREATININE 0.94 11/06/2023    CREATININE 0.83 11/02/2023      Lab Results   Component Value Date    URINECULTURE NO SIGNIFICANT GROWTH. 08/08/2023    TISWDCULTSME Culture in progress 11/07/2023     I/O last 3 completed shifts:  In: 2181.7 (27.8 mL/kg) [I.V.:831.7 (10.6 mL/kg); IV Piggyback:1350]  Out: - (0 mL/kg)   Weight: 78.5 kg     Assessment/Plan    Day #4 of vancomycin therapy  Within goal AUC range. Continue current vancomycin regimen.    This dosing regimen is predicted by [employer] to result in the following pharmacokinetic parameters:  AUC24,ss: 429 mg/L.hr  Probability of AUC24 > 400: 64 %  Ctrough,ss: 11.9 mg/L  Probability of Ctrough,ss > 20: 6 %  Probability of nephrotoxicity (Lodise ROSEMARY 2009): 7 %    The next level will be obtained on 11/13 with mid-AM labs. May be obtained sooner if clinically indicated.   Will continue to monitor renal function daily while on vancomycin and order serum creatinine at least every 48 hours if not already ordered.  Follow for continued vancomycin needs, clinical response, and signs/symptoms of toxicity.     Please call with any questions.  Nori Townsend, PharmD, BCCCP  [id number]

## 2023-11-09 NOTE — PROGRESS NOTES
Patient will be discharging home to [place] with a / wound VAC. Patient does not have a PCP in [place], thus is unable to have home health care due to licensure issues across state lines. Spoke with patient, who indicated that he has used the [facility] in the past. He will be discharging to his grandmother's house and patient indicated that his grandmother will be able to assist with transportation to the wound clinic appointments. Spoke with the [facility] in [facility] ([phone]) and clinicals were faxed to the clinic for review ([phone]). Paperwork was submitted to [facility]/ liaison Cheri Piña ([phone] (phone) [phone] (fax)) for review and processing. Care Transitions will need to follow up with the wound clinic and with [facility] tomorrow to ensure that everything is in place. All entities are aware that ADOD is 11/13. [facility] to deliver VAC to patient's hospital room for application prior to discharge. Care Transitions team will be following closely.

## 2023-11-09 NOTE — PROGRESS NOTES
"Damian Galdamez is a 23 y.o. adult on day 3 of admission presenting with Wound infection.    Subjective   No acute events overnight. Pain continues to be well controlled. He reports no issues with wound VAC. No nausea or vomiting. Passing flatus, no BM.        Objective     Physical Exam  Constitutional:       Appearance: Normal appearance.   HENT:      Mouth/Throat:      Mouth: Mucous membranes are moist.   Cardiovascular:      Rate and Rhythm: Normal rate and regular rhythm.   Pulmonary:      Effort: Pulmonary effort is normal.      Breath sounds: Normal breath sounds.   Abdominal:      Palpations: Abdomen is soft.   Genitourinary:     Comments: Wound VAC to ventral aspect of neophallus, intact at 125mmHg negative pressure continuous, <1/4 full of dark blood in cannister. Neophallus is warm to touch, good cap refill, normal skin tone  Skin:     General: Skin is warm and dry.   Neurological:      Mental Status: He is alert and oriented to person, place, and time. Mental status is at baseline.   Psychiatric:         Mood and Affect: Mood normal.         Behavior: Behavior normal.       Last Recorded Vitals  Blood pressure 117/66, pulse 63, temperature 36.7 °C (98.1 °F), temperature source Temporal, resp. rate 18, height 1.727 m (5' 7.99\"), weight 78.5 kg (173 lb 1 oz), SpO2 98 %.  Intake/Output last 3 Shifts:  I/O last 3 completed shifts:  In: 2181.7 (27.8 mL/kg) [I.V.:831.7 (10.6 mL/kg); IV Piggyback:1350]  Out: - (0 mL/kg)   Weight: 78.5 kg     Relevant Results  Scheduled medications  acetaminophen, 975 mg, oral, q6h  ampicillin-sulbactam, 3 g, intravenous, q6h  enoxaparin, 40 mg, subcutaneous, q24h  ibuprofen, 600 mg, oral, q6h KAYLEIGH  ketorolac, 15 mg, intravenous, q6h  sennosides-docusate sodium, 2 tablet, oral, BID  vancomycin, 1,250 mg, intravenous, q12h      Continuous medications     PRN medications  PRN medications: ondansetron **OR** ondansetron, oxyCODONE, oxyCODONE    Results for orders placed or performed " during the hospital encounter of 11/06/23 (from the past 24 hour(s))   CBC   Result Value Ref Range    WBC 10.5 4.4 - 11.3 x10*3/uL    nRBC 0.0 0.0 - 0.0 /100 WBCs    RBC 3.89 (L) 4.00 - 5.90 x10*6/uL    Hemoglobin 11.8 (L) 12.0 - 17.5 g/dL    Hematocrit 35.5 (L) 36.0 - 52.0 %    MCV 91 80 - 100 fL    MCH 30.3 26.0 - 34.0 pg    MCHC 33.2 32.0 - 36.0 g/dL    RDW 12.2 11.5 - 14.5 %    Platelets 226 150 - 450 x10*3/uL   Basic Metabolic Panel   Result Value Ref Range    Glucose 103 (H) 74 - 99 mg/dL    Sodium 138 136 - 145 mmol/L    Potassium 4.3 3.5 - 5.3 mmol/L    Chloride 108 (H) 98 - 107 mmol/L    Bicarbonate 25 21 - 32 mmol/L    Anion Gap 9 (L) 10 - 20 mmol/L    Urea Nitrogen 17 6 - 23 mg/dL    Creatinine 0.75 0.50 - 1.30 mg/dL    eGFR >90 >60 mL/min/1.73m*2    Calcium 8.5 (L) 8.6 - 10.3 mg/dL       No results found.                           Assessment/Plan   Principal Problem:    Wound infection    22 yo male with gender dysphoria s/p ALT thigh phalloplasty 6 weeks ago and ventral closure not achieved due to flap tightness. He returned on 11/1 and underwent excision of scar tissue with partial closure of flap with Kerecis mesh in place. Post-operative course as expected and patient was discharged home with incision care supplies and instructions. He returned to [facility] ER on Saturday with new wound dehiscence who deferred care to us. He was directly admitted from clinic 11/7 for wound dehiscence and  surgical site infection. He is POD 1 for debridement of infected and adhesed penile wound, use of ultrasonic Masonic's debridement, placement of Kerecis and wound VAC with Dr. Castillo.      Neophallus with intact wound VAC, cannister <1/4 full. Neophallus warm to touch, good cap refill (<3 sec), normal skin tone.      - Continue Vancomycin and Unasyn per ID recommendations  > Waiting on final cultures from surgery  - Maintain wound VAC for 5-6 days  - Multimodal pain management  - Regular diet  - Continue bowel  regimen  > Passing flatus, no BM  - OOB as tolerated     PPX: SCDs and subcutaneous Lovenox     Dispo: Home, 11/13 with plan to takedown wound VAC this day and re-evaluate. Possible wound VAC and home care at discharge.        I spent 45 minutes in the professional and overall care of this patient.      Dee Dee Wahl, APRN-CNP

## 2023-11-09 NOTE — CARE PLAN
Problem: Fall/Injury  Goal: Not fall by end of shift  Outcome: Progressing  Goal: Be free from injury by end of the shift  Outcome: Progressing  Goal: Verbalize understanding of personal risk factors for fall in the hospital  Outcome: Progressing  Goal: Verbalize understanding of risk factor reduction measures to prevent injury from fall in the home  Outcome: Progressing  Goal: Use assistive devices by end of the shift  Outcome: Progressing  Goal: Pace activities to prevent fatigue by end of the shift  Outcome: Progressing     Problem: Skin  Goal: Decreased wound size/increased tissue granulation at next dressing change  Outcome: Progressing  Goal: Participates in plan/prevention/treatment measures  Outcome: Progressing  Goal: Prevent/manage excess moisture  Outcome: Progressing  Goal: Prevent/minimize sheer/friction injuries  Outcome: Progressing  Goal: Promote/optimize nutrition  Outcome: Progressing  Goal: Promote skin healing  Outcome: Progressing     Problem: Pain - Adult  Goal: Verbalizes/displays adequate comfort level or baseline comfort level  Outcome: Progressing     Problem: Safety - Adult  Goal: Free from fall injury  Outcome: Progressing     Problem: Discharge Planning  Goal: Discharge to home or other facility with appropriate resources  Outcome: Progressing     Problem: Chronic Conditions and Co-morbidities  Goal: Patient's chronic conditions and co-morbidity symptoms are monitored and maintained or improved  Outcome: Progressing   The patient's goals for the shift include or/iv atb    The clinical goals for the shift include patient will remain afebrile by end of shift

## 2023-11-10 LAB
B-LACTAMASE ORGANISM ISLT: POSITIVE
BACTERIA SPEC CULT: ABNORMAL
GRAM STN SPEC: ABNORMAL
GRAM STN SPEC: ABNORMAL

## 2023-11-10 PROCEDURE — 96372 THER/PROPH/DIAG INJ SC/IM: CPT | Performed by: NURSE PRACTITIONER

## 2023-11-10 PROCEDURE — 1100000001 HC PRIVATE ROOM DAILY

## 2023-11-10 PROCEDURE — 2500000004 HC RX 250 GENERAL PHARMACY W/ HCPCS (ALT 636 FOR OP/ED)

## 2023-11-10 PROCEDURE — 99232 SBSQ HOSP IP/OBS MODERATE 35: CPT | Performed by: NURSE PRACTITIONER

## 2023-11-10 PROCEDURE — 2500000004 HC RX 250 GENERAL PHARMACY W/ HCPCS (ALT 636 FOR OP/ED): Performed by: NURSE PRACTITIONER

## 2023-11-10 PROCEDURE — 2500000001 HC RX 250 WO HCPCS SELF ADMINISTERED DRUGS (ALT 637 FOR MEDICARE OP): Performed by: NURSE PRACTITIONER

## 2023-11-10 RX ADMIN — AMPICILLIN SODIUM AND SULBACTAM SODIUM 3 G: 2; 1 INJECTION, POWDER, FOR SOLUTION INTRAMUSCULAR; INTRAVENOUS at 05:30

## 2023-11-10 RX ADMIN — ENOXAPARIN SODIUM 40 MG: 100 INJECTION SUBCUTANEOUS at 15:59

## 2023-11-10 RX ADMIN — AMPICILLIN SODIUM AND SULBACTAM SODIUM 3 G: 2; 1 INJECTION, POWDER, FOR SOLUTION INTRAMUSCULAR; INTRAVENOUS at 12:03

## 2023-11-10 RX ADMIN — VANCOMYCIN HYDROCHLORIDE 1.25 G: 1.25 INJECTION, POWDER, LYOPHILIZED, FOR SOLUTION INTRAVENOUS at 15:59

## 2023-11-10 RX ADMIN — ACETAMINOPHEN 975 MG: 325 TABLET ORAL at 15:59

## 2023-11-10 RX ADMIN — ACETAMINOPHEN 975 MG: 325 TABLET ORAL at 02:56

## 2023-11-10 RX ADMIN — VANCOMYCIN HYDROCHLORIDE 1.25 G: 1.25 INJECTION, POWDER, LYOPHILIZED, FOR SOLUTION INTRAVENOUS at 02:59

## 2023-11-10 RX ADMIN — AMPICILLIN SODIUM AND SULBACTAM SODIUM 3 G: 2; 1 INJECTION, POWDER, FOR SOLUTION INTRAMUSCULAR; INTRAVENOUS at 18:44

## 2023-11-10 RX ADMIN — SENNOSIDES AND DOCUSATE SODIUM 2 TABLET: 8.6; 5 TABLET ORAL at 20:27

## 2023-11-10 RX ADMIN — ACETAMINOPHEN 975 MG: 325 TABLET ORAL at 10:16

## 2023-11-10 RX ADMIN — SENNOSIDES AND DOCUSATE SODIUM 2 TABLET: 8.6; 5 TABLET ORAL at 10:16

## 2023-11-10 RX ADMIN — ACETAMINOPHEN 975 MG: 325 TABLET ORAL at 22:36

## 2023-11-10 ASSESSMENT — COGNITIVE AND FUNCTIONAL STATUS - GENERAL
DAILY ACTIVITIY SCORE: 24
CLIMB 3 TO 5 STEPS WITH RAILING: A LITTLE
MOBILITY SCORE: 22
WALKING IN HOSPITAL ROOM: A LITTLE

## 2023-11-10 ASSESSMENT — PAIN SCALES - GENERAL
PAINLEVEL_OUTOF10: 2
PAINLEVEL_OUTOF10: 0 - NO PAIN

## 2023-11-10 NOTE — PROGRESS NOTES
"Infectious Disease Progress Note    Patient Name: Damian Galdamez   YOB: 2000    Subjective:    Objective:    /50 (BP Location: Left arm, Patient Position: Lying)   Pulse 67   Temp 36.7 °C (98.1 °F)   Resp 18   Ht 1.727 m (5' 7.99\")   Wt 78.5 kg (173 lb 1 oz)   SpO2 99%   BMI 26.32 kg/m²         Assessment/Plan:  Damian Galdamez is a 23 y.o. adult with past medical history of gender dysphoria s/p ALT thigh phalloplasty 6 weeks ago and ventral closure not achieved due to flap tightness. Patient returned on 11/1 and underwent excision of scar tissue with partial closure of flap with kerecis mesh in place. ID was consulted for possible surgical site infection.      Possible Surgical Site Infection     Wound culture from outside hospital 11/04:   Aerobic - 4+ enterococcus faecalis, 4+ pasteurella caris, 4+ klebsiella oxytoca, 4+ Citrobacter koseri, 4+ Ecoli, 4+ mixed skin cholo  Anaerobic - 4+ Prevotella bivia, 4+ Prevotella disiens     Given enterococcus and pasteurella growth continue with current antibiotics. Will consider de-escalating and titrating with surgical culture results. Current results showing mixed gram + and gram -.   Advised patient on infection prevention when going home. Patient will not be living in the same house at discharge (previously lived with 2 dogs). Advised patient on wound cleanser as patient will not be able to get wound wet.      Antibiotics: Vanc (day 5), Unasyn (day 4)     I have reviewed and interpreted all lab test imaging studies and documentations from other healthcare providers. Discussed antibiotics and potential side effects with patient.      This is a preliminary note, please await attending attestation for a finalized plan.     Jorge Corey MD  Infectious Disease   "

## 2023-11-10 NOTE — CARE PLAN
The patient's goals for the shift include or/iv atb    The clinical goals for the shift include pain management / infection control      Problem: Fall/Injury  Goal: Not fall by end of shift  11/10/2023 0633 by Nori Bennett RN  Outcome: Progressing  11/9/2023 2301 by Nori Bennett RN  Outcome: Progressing  Goal: Be free from injury by end of the shift  11/10/2023 0633 by Nori Bennett RN  Outcome: Progressing  11/9/2023 2301 by Nori Bennett RN  Outcome: Progressing  Goal: Verbalize understanding of personal risk factors for fall in the hospital  11/10/2023 0633 by Nori Bennett RN  Outcome: Progressing  11/9/2023 2301 by Nori Bennett RN  Outcome: Progressing  Goal: Verbalize understanding of risk factor reduction measures to prevent injury from fall in the home  11/10/2023 0633 by Nori Bennett RN  Outcome: Progressing  11/9/2023 2301 by Nori Bennett RN  Outcome: Progressing  Goal: Use assistive devices by end of the shift  11/10/2023 0633 by Nori Bennett RN  Outcome: Progressing  11/9/2023 2301 by Nori Bennett RN  Outcome: Progressing  Goal: Pace activities to prevent fatigue by end of the shift  11/10/2023 0633 by Nori Bennett RN  Outcome: Progressing  11/9/2023 2301 by Nori Bennett RN  Outcome: Progressing

## 2023-11-10 NOTE — PROGRESS NOTES
"Damian Galdamez is a 23 y.o. adult on day 4 of admission presenting with Wound infection.    Subjective   No acute events overnight. Pain is well controlled. Moving bowels with no straining. Tolerating regular diet. Noticed tape around wound VAC is coming loose but no evidence of VAC alarm leaking       Objective     Physical Exam  Constitutional:       Appearance: Normal appearance.   HENT:      Mouth/Throat:      Mouth: Mucous membranes are moist.   Cardiovascular:      Rate and Rhythm: Normal rate and regular rhythm.   Pulmonary:      Effort: Pulmonary effort is normal.      Breath sounds: Normal breath sounds.   Abdominal:      Palpations: Abdomen is soft.   Genitourinary:     Comments: Wound VAC to ventral aspect of neophallus, intact at 125mmHg negative pressure continuous, <1/4 full of dark blood in cannister. Wheezing sound around ventral aspect of wound VAC with tape loose. VAC taken down, Ventral part of phallus is open with red granulation tissue, no purulent drainage or odor, area covered with Xeroform and Kerlix. Neophallus is warm to touch, good cap refill, normal skin tone  Skin:     General: Skin is warm and dry.   Neurological:      Mental Status: He is alert and oriented to person, place, and time. Mental status is at baseline.   Psychiatric:         Mood and Affect: Mood normal.         Behavior: Behavior normal.         Last Recorded Vitals  Blood pressure 103/50, pulse 67, temperature 36.7 °C (98.1 °F), resp. rate 18, height 1.727 m (5' 7.99\"), weight 78.5 kg (173 lb 1 oz), SpO2 99 %.  Intake/Output last 3 Shifts:  I/O last 3 completed shifts:  In: 3120 (39.7 mL/kg) [P.O.:720; IV Piggyback:2400]  Out: - (0 mL/kg)   Weight: 78.5 kg     Relevant Results  Scheduled medications  acetaminophen, 975 mg, oral, q6h  ampicillin-sulbactam, 3 g, intravenous, q6h  enoxaparin, 40 mg, subcutaneous, q24h  ibuprofen, 600 mg, oral, q6h KAYLEIHG  sennosides-docusate sodium, 2 tablet, oral, BID  vancomycin, 1,250 mg, " intravenous, q12h      Continuous medications     PRN medications  PRN medications: ondansetron **OR** ondansetron, oxyCODONE, oxyCODONE    No results found for this or any previous visit (from the past 24 hour(s)).    No results found.                           Assessment/Plan   Principal Problem:    Wound infection    22 yo male with gender dysphoria s/p ALT thigh phalloplasty 6 weeks ago and ventral closure not achieved due to flap tightness. He returned on 11/1 and underwent excision of scar tissue with partial closure of flap with Kerecis mesh in place. Post-operative course as expected and patient was discharged home with incision care supplies and instructions. He returned to [facility] on Saturday with new wound dehiscence who deferred care to us. He was directly admitted from clinic 11/7 for wound dehiscence and  surgical site infection. He is POD 1 for debridement of infected and adhesed penile wound, use of ultrasonic Masonic's debridement, placement of Kerecis and wound VAC with Dr. Castillo.      Neophallus with leaking wound VAC, cannister <1/4 full. VAC taken down at bedside, Ventral part of phallus is open with red granulation tissue, no purulent drainage or odor, area covered with Xeroform and Kerlix. Neophallus warm to touch, good cap refill (<3 sec), normal skin tone.      - Continue Vancomycin and Unasyn per ID recommendations  > Waiting on final cultures from surgery  - VAC taken down today and covered with Xeroform and Kerlix wrap  > Dr. Castillo will come to bedside to re-evaluate   - Multimodal pain management  - Regular diet  - Continue bowel regimen  - OOB as tolerated     PPX: SCDs and subcutaneous Lovenox     Dispo: Home, 11/13 with plan to takedown wound VAC this day and re-evaluate. Possible homegoing with wound VAC. Pt will be set-up with [facility] in [place], [place], clinicals were faxed yesterday. Paperwork submitted to [facility]/ Liaison for review and  processing with goal to deliver VAC to bedside prior to discharge. Appreciate TCC updates.        I spent 45 minutes in the professional and overall care of this patient.      Dee Dee Wahl, APRN-CNP

## 2023-11-10 NOTE — CARE PLAN
The patient's goals for the shift include or/iv atb    The clinical goals for the shift include ambulate on unit      Problem: Fall/Injury  Goal: Not fall by end of shift  Outcome: Progressing  Goal: Be free from injury by end of the shift  Outcome: Progressing  Goal: Verbalize understanding of personal risk factors for fall in the hospital  Outcome: Progressing  Goal: Verbalize understanding of risk factor reduction measures to prevent injury from fall in the home  Outcome: Progressing  Goal: Use assistive devices by end of the shift  Outcome: Progressing  Goal: Pace activities to prevent fatigue by end of the shift  Outcome: Progressing     Problem: Skin  Goal: Decreased wound size/increased tissue granulation at next dressing change  Outcome: Progressing  Goal: Participates in plan/prevention/treatment measures  Outcome: Progressing  Goal: Prevent/manage excess moisture  Outcome: Progressing  Goal: Prevent/minimize sheer/friction injuries  Outcome: Progressing  Goal: Promote/optimize nutrition  Outcome: Progressing  Goal: Promote skin healing  Outcome: Progressing     Problem: Pain - Adult  Goal: Verbalizes/displays adequate comfort level or baseline comfort level  Outcome: Progressing     Problem: Safety - Adult  Goal: Free from fall injury  Outcome: Progressing     Problem: Discharge Planning  Goal: Discharge to home or other facility with appropriate resources  Outcome: Progressing     Problem: Chronic Conditions and Co-morbidities  Goal: Patient's chronic conditions and co-morbidity symptoms are monitored and maintained or improved  Outcome: Progressing     Problem: Pain  Goal: My pain/discomfort is manageable  Outcome: Progressing     Problem: Psychosocial Needs  Goal: Demonstrates ability to cope with hospitalization/illness  Outcome: Progressing

## 2023-11-10 NOTE — PROGRESS NOTES
11/10/23 1143  Spoke with [facility]/ liaison Cherisabine Piña ([phone]). Needs NP to sign order and send back d/t two people being on the initial order. Provided Cheri with NP email to sign and send back. Notified NP to look out for the email. Once completed Cheri will reach back out to this CT supervisor. Called [facility] in [facility] ([phone]), Praneeth, and she stated that MD is reviewing and then they will call pt to schedule an appt. Will continue to follow.  Sydni Jesus RN, BSN, [place]/ [employer] CT Supervisor     11/10/23 1434  Called Cheri back and pt wound vac was approved and will be delivered this afternoon.     11/13/23 0948  Spoke with [facility] and no availability for 4 weeks. Was referred to [facility] [phone] and spoke with them. Confirmed an appointment for 11/15/23 at 2:30pm at their [place] office. Pt aware and agreeable.     11/13/23 1300  Was informed that pt no longer needs wound vac. Cheri from [facility] said they can leave wound vac at nurses station for . Called [facility] [phone]. Was informed that they will need to Change appt to 11/16/23 at 12p at their main office at [address] [place] [address]. They will be able to set up HHC once pt is seen. Pt aware and agreeable.   Sydni Jesus RN, BSN, [place]/ [employer] CT Supervisor

## 2023-11-10 NOTE — CARE PLAN
The patient's goals for the shift include or/iv atb    The clinical goals for the shift include continue antibiotics

## 2023-11-10 NOTE — CARE PLAN
The patient's goals for the shift include or/iv atb    The clinical goals for the shift include pain management / infection control

## 2023-11-11 LAB
ANION GAP SERPL CALC-SCNC: 10 MMOL/L (ref 10–20)
BUN SERPL-MCNC: 14 MG/DL (ref 6–23)
CALCIUM SERPL-MCNC: 9.1 MG/DL (ref 8.6–10.3)
CHLORIDE SERPL-SCNC: 104 MMOL/L (ref 98–107)
CO2 SERPL-SCNC: 29 MMOL/L (ref 21–32)
CREAT SERPL-MCNC: 0.69 MG/DL (ref 0.5–1.3)
ERYTHROCYTE [DISTWIDTH] IN BLOOD BY AUTOMATED COUNT: 12.4 % (ref 11.5–14.5)
GFR SERPL CREATININE-BSD FRML MDRD: >90 ML/MIN/1.73M*2
GLUCOSE SERPL-MCNC: 82 MG/DL (ref 74–99)
HCT VFR BLD AUTO: 38.2 % (ref 36–52)
HGB BLD-MCNC: 12.3 G/DL (ref 12–17.5)
MCH RBC QN AUTO: 30.1 PG (ref 26–34)
MCHC RBC AUTO-ENTMCNC: 32.2 G/DL (ref 32–36)
MCV RBC AUTO: 93 FL (ref 80–100)
NRBC BLD-RTO: 0 /100 WBCS (ref 0–0)
PLATELET # BLD AUTO: 256 X10*3/UL (ref 150–450)
POTASSIUM SERPL-SCNC: 4.3 MMOL/L (ref 3.5–5.3)
RBC # BLD AUTO: 4.09 X10*6/UL (ref 4–5.9)
SODIUM SERPL-SCNC: 139 MMOL/L (ref 136–145)
WBC # BLD AUTO: 6.5 X10*3/UL (ref 4.4–11.3)

## 2023-11-11 PROCEDURE — 99232 SBSQ HOSP IP/OBS MODERATE 35: CPT | Performed by: REGISTERED NURSE

## 2023-11-11 PROCEDURE — 85027 COMPLETE CBC AUTOMATED: CPT | Performed by: NURSE PRACTITIONER

## 2023-11-11 PROCEDURE — 2500000004 HC RX 250 GENERAL PHARMACY W/ HCPCS (ALT 636 FOR OP/ED): Performed by: NURSE PRACTITIONER

## 2023-11-11 PROCEDURE — 80048 BASIC METABOLIC PNL TOTAL CA: CPT | Performed by: INTERNAL MEDICINE

## 2023-11-11 PROCEDURE — 2500000004 HC RX 250 GENERAL PHARMACY W/ HCPCS (ALT 636 FOR OP/ED)

## 2023-11-11 PROCEDURE — 1100000001 HC PRIVATE ROOM DAILY

## 2023-11-11 PROCEDURE — 96372 THER/PROPH/DIAG INJ SC/IM: CPT | Performed by: NURSE PRACTITIONER

## 2023-11-11 PROCEDURE — 36415 COLL VENOUS BLD VENIPUNCTURE: CPT | Performed by: NURSE PRACTITIONER

## 2023-11-11 PROCEDURE — 2500000001 HC RX 250 WO HCPCS SELF ADMINISTERED DRUGS (ALT 637 FOR MEDICARE OP): Performed by: NURSE PRACTITIONER

## 2023-11-11 PROCEDURE — 36415 COLL VENOUS BLD VENIPUNCTURE: CPT | Performed by: INTERNAL MEDICINE

## 2023-11-11 RX ADMIN — VANCOMYCIN HYDROCHLORIDE 1.25 G: 1.25 INJECTION, POWDER, LYOPHILIZED, FOR SOLUTION INTRAVENOUS at 02:57

## 2023-11-11 RX ADMIN — VANCOMYCIN HYDROCHLORIDE 1.25 G: 1.25 INJECTION, POWDER, LYOPHILIZED, FOR SOLUTION INTRAVENOUS at 15:23

## 2023-11-11 RX ADMIN — AMPICILLIN SODIUM AND SULBACTAM SODIUM 3 G: 2; 1 INJECTION, POWDER, FOR SOLUTION INTRAMUSCULAR; INTRAVENOUS at 00:16

## 2023-11-11 RX ADMIN — AMPICILLIN SODIUM AND SULBACTAM SODIUM 3 G: 2; 1 INJECTION, POWDER, FOR SOLUTION INTRAMUSCULAR; INTRAVENOUS at 06:03

## 2023-11-11 RX ADMIN — IBUPROFEN 600 MG: 600 TABLET ORAL at 00:16

## 2023-11-11 RX ADMIN — ACETAMINOPHEN 975 MG: 325 TABLET ORAL at 06:04

## 2023-11-11 RX ADMIN — ACETAMINOPHEN 975 MG: 325 TABLET ORAL at 21:14

## 2023-11-11 RX ADMIN — AMPICILLIN SODIUM AND SULBACTAM SODIUM 3 G: 2; 1 INJECTION, POWDER, FOR SOLUTION INTRAMUSCULAR; INTRAVENOUS at 13:02

## 2023-11-11 RX ADMIN — AMPICILLIN SODIUM AND SULBACTAM SODIUM 3 G: 2; 1 INJECTION, POWDER, FOR SOLUTION INTRAMUSCULAR; INTRAVENOUS at 21:14

## 2023-11-11 RX ADMIN — ENOXAPARIN SODIUM 40 MG: 100 INJECTION SUBCUTANEOUS at 13:02

## 2023-11-11 RX ADMIN — ACETAMINOPHEN 975 MG: 325 TABLET ORAL at 13:02

## 2023-11-11 ASSESSMENT — COGNITIVE AND FUNCTIONAL STATUS - GENERAL
DAILY ACTIVITIY SCORE: 24
WALKING IN HOSPITAL ROOM: A LITTLE
MOBILITY SCORE: 22
CLIMB 3 TO 5 STEPS WITH RAILING: A LITTLE
MOBILITY SCORE: 22
DAILY ACTIVITIY SCORE: 24
CLIMB 3 TO 5 STEPS WITH RAILING: A LITTLE
WALKING IN HOSPITAL ROOM: A LITTLE

## 2023-11-11 ASSESSMENT — PAIN SCALES - GENERAL
PAINLEVEL_OUTOF10: 0 - NO PAIN
PAINLEVEL_OUTOF10: 4
PAINLEVEL_OUTOF10: 0 - NO PAIN
PAINLEVEL_OUTOF10: 0 - NO PAIN

## 2023-11-11 NOTE — CARE PLAN
Problem: Fall/Injury  Goal: Not fall by end of shift  Outcome: Progressing  Goal: Be free from injury by end of the shift  Outcome: Progressing  Goal: Verbalize understanding of personal risk factors for fall in the hospital  Outcome: Progressing  Goal: Verbalize understanding of risk factor reduction measures to prevent injury from fall in the home  Outcome: Progressing  Goal: Use assistive devices by end of the shift  Outcome: Progressing  Goal: Pace activities to prevent fatigue by end of the shift  Outcome: Progressing     Problem: Skin  Goal: Decreased wound size/increased tissue granulation at next dressing change  Outcome: Progressing  Goal: Participates in plan/prevention/treatment measures  Outcome: Progressing  Goal: Prevent/manage excess moisture  Outcome: Progressing  Goal: Prevent/minimize sheer/friction injuries  Outcome: Progressing  Goal: Promote/optimize nutrition  Outcome: Progressing  Goal: Promote skin healing  Outcome: Progressing     Problem: Pain - Adult  Goal: Verbalizes/displays adequate comfort level or baseline comfort level  Outcome: Progressing     Problem: Safety - Adult  Goal: Free from fall injury  Outcome: Progressing     Problem: Discharge Planning  Goal: Discharge to home or other facility with appropriate resources  Outcome: Progressing     Problem: Chronic Conditions and Co-morbidities  Goal: Patient's chronic conditions and co-morbidity symptoms are monitored and maintained or improved  Outcome: Progressing     Problem: Pain  Goal: My pain/discomfort is manageable  Outcome: Progressing     Problem: Psychosocial Needs  Goal: Demonstrates ability to cope with hospitalization/illness  Outcome: Progressing     Problem: Pain  Goal: Takes deep breaths with improved pain control throughout the shift  Outcome: Progressing  Goal: Turns in bed with improved pain control throughout the shift  Outcome: Progressing  Goal: Walks with improved pain control throughout the  shift  Outcome: Progressing  Goal: Performs ADL's with improved pain control throughout shift  Outcome: Progressing  Goal: Participates in PT with improved pain control throughout the shift  Outcome: Progressing  Goal: Free from opioid side effects throughout the shift  Outcome: Progressing  Goal: Free from acute confusion related to pain meds throughout the shift  Outcome: Progressing   The patient's goals for the shift include or/iv atb    The clinical goals for the shift include cont atb

## 2023-11-11 NOTE — CARE PLAN
The patient's goals for the shift include or/iv atb    The clinical goals for the shift include ambulate on unit    Over the shift, the patient did not make progress toward the following goals. Barriers to progression include none. Recommendations to address these barriers include encouragement.

## 2023-11-11 NOTE — PROGRESS NOTES
"Damian Galdamez is a 23 y.o. adult on day 5 of admission presenting with Wound infection.    Subjective   Patient denies any focal complaints. Denied fever or chills. He is asking about discharge planning. Happy that the odor and purulent drainage are now gone.   Stating that his goal is to go home and proceed with outpatient wound center.        Objective     Physical Exam  Constitutional:       Appearance: Normal appearance.   HENT:      Mouth/Throat:      Mouth: Mucous membranes are moist.   Cardiovascular:      Rate and Rhythm: Normal rate and regular rhythm.   Pulmonary:      Effort: Pulmonary effort is normal.      Breath sounds: Normal breath sounds.   Abdominal:      Palpations: Abdomen is soft.   Genitourinary:     Comments: Kerlix removed - saline to assist with sticking. Ventral part of phallus is open with red granulation tissue, no purulent drainage or odor. Neophallus is warm to touch, good cap refill, normal skin tone  Skin:     General: Skin is warm and dry.   Neurological:      Mental Status: He is alert and oriented to person, place, and time. Mental status is at baseline.   Psychiatric:         Mood and Affect: Mood normal.         Behavior: Behavior normal.     Last Recorded Vitals  Blood pressure 110/56, pulse 71, temperature 36.6 °C (97.9 °F), resp. rate 18, height 1.727 m (5' 7.99\"), weight 78.5 kg (173 lb 1 oz), SpO2 98 %.  Intake/Output last 3 Shifts:  I/O last 3 completed shifts:  In: 1250 (15.9 mL/kg) [IV Piggyback:1250]  Out: - (0 mL/kg)   Weight: 78.5 kg     Relevant Results  Scheduled medications  acetaminophen, 975 mg, oral, q6h  ampicillin-sulbactam, 3 g, intravenous, q6h  enoxaparin, 40 mg, subcutaneous, q24h  ibuprofen, 600 mg, oral, q6h KAYLEIGH  sennosides-docusate sodium, 2 tablet, oral, BID  vancomycin, 1,250 mg, intravenous, q12h      Continuous medications     PRN medications  PRN medications: ondansetron **OR** ondansetron, oxyCODONE, oxyCODONE    Results for orders placed or " performed during the hospital encounter of 11/06/23 (from the past 24 hour(s))   Basic metabolic panel   Result Value Ref Range    Glucose 82 74 - 99 mg/dL    Sodium 139 136 - 145 mmol/L    Potassium 4.3 3.5 - 5.3 mmol/L    Chloride 104 98 - 107 mmol/L    Bicarbonate 29 21 - 32 mmol/L    Anion Gap 10 10 - 20 mmol/L    Urea Nitrogen 14 6 - 23 mg/dL    Creatinine 0.69 0.50 - 1.30 mg/dL    eGFR >90 >60 mL/min/1.73m*2    Calcium 9.1 8.6 - 10.3 mg/dL   CBC   Result Value Ref Range    WBC 6.5 4.4 - 11.3 x10*3/uL    nRBC 0.0 0.0 - 0.0 /100 WBCs    RBC 4.09 4.00 - 5.90 x10*6/uL    Hemoglobin 12.3 12.0 - 17.5 g/dL    Hematocrit 38.2 36.0 - 52.0 %    MCV 93 80 - 100 fL    MCH 30.1 26.0 - 34.0 pg    MCHC 32.2 32.0 - 36.0 g/dL    RDW 12.4 11.5 - 14.5 %    Platelets 256 150 - 450 x10*3/uL         Assessment/Plan   Principal Problem:    Wound infection    24 yo male with gender dysphoria s/p ALT thigh phalloplasty 6 weeks ago and ventral closure not achieved due to flap tightness. He returned on 11/1 and underwent excision of scar tissue with partial closure of flap with Kerecis mesh in place. Post-operative course as expected and patient was discharged home with incision care supplies and instructions. He returned to [facility] ER on Saturday (11/4) with new wound dehiscence. ER deferred care to /Dr. Castillo. Patient was directly admitted from clinic 11/7 for wound dehiscence and surgical site infection.   He is s/p debridement of infected and adhesed penile wound, use of ultrasonic Masonic's debridement, placement of Kerecis and wound VAC on 11/7 with Dr. Castillo.      Neophallus with leaking wound VAC, cannister <1/4 full on 11/10. VAC taken down at bedside, ventral part of phallus is open with red granulation tissue, no purulent drainage or odor, area covered with Xeroform and Kerlix. Neophallus warm to touch, good cap refill (<3 sec), normal skin tone.      #surgical site infection  - Continue Vancomycin and Unasyn per ID  recommendations  > Waiting on final cultures from surgery. OSH cultures grew:   Aerobic - 4+ enterococcus faecalis, 4+ pasteurella caris, 4+ klebsiella oxytoca, 4+   Citrobacter koseri, 4+ Ecoli, 4+ mixed skin cholo  Anaerobic - 4+ Prevotella bivia, 4+ Prevotella disiens  - Damp to dry Kerlix dressing changes TID  - Multimodal pain management  - Regular diet  - Continue bowel regimen  - OOB as tolerated     PPX: SCDs and subcutaneous Lovenox     Dispo: Home, 11/13 with plan for patient to follow-up with [facility] in [place], [place], clinicals were faxed 11/9. Paperwork submitted to [facility]/ Liaison for review and processing with goal to deliver VAC to bedside prior to discharge; however, unlikely to need VAC with failed seal. Appreciate TCC updates.     Still awaiting final ID recs prior to discharge        I spent 30 minutes in the professional and overall care of this patient.      Jesika Mendoza, ADRIANO-CNP

## 2023-11-12 PROCEDURE — 2500000004 HC RX 250 GENERAL PHARMACY W/ HCPCS (ALT 636 FOR OP/ED)

## 2023-11-12 PROCEDURE — 96372 THER/PROPH/DIAG INJ SC/IM: CPT | Performed by: REGISTERED NURSE

## 2023-11-12 PROCEDURE — 2500000004 HC RX 250 GENERAL PHARMACY W/ HCPCS (ALT 636 FOR OP/ED): Performed by: NURSE PRACTITIONER

## 2023-11-12 PROCEDURE — 99231 SBSQ HOSP IP/OBS SF/LOW 25: CPT | Performed by: REGISTERED NURSE

## 2023-11-12 PROCEDURE — 96372 THER/PROPH/DIAG INJ SC/IM: CPT | Performed by: NURSE PRACTITIONER

## 2023-11-12 PROCEDURE — 2500000004 HC RX 250 GENERAL PHARMACY W/ HCPCS (ALT 636 FOR OP/ED): Performed by: REGISTERED NURSE

## 2023-11-12 PROCEDURE — 2500000001 HC RX 250 WO HCPCS SELF ADMINISTERED DRUGS (ALT 637 FOR MEDICARE OP): Performed by: NURSE PRACTITIONER

## 2023-11-12 PROCEDURE — 1100000001 HC PRIVATE ROOM DAILY

## 2023-11-12 RX ORDER — TESTOSTERONE CYPIONATE 1000 MG/10ML
50 INJECTION, SOLUTION INTRAMUSCULAR ONCE
Status: COMPLETED | OUTPATIENT
Start: 2023-11-12 | End: 2023-11-12

## 2023-11-12 RX ORDER — TESTOSTERONE CYPIONATE 1000 MG/10ML
50 INJECTION, SOLUTION INTRAMUSCULAR ONCE
Status: DISCONTINUED | OUTPATIENT
Start: 2023-11-12 | End: 2023-11-12 | Stop reason: RX

## 2023-11-12 RX ADMIN — AMPICILLIN SODIUM AND SULBACTAM SODIUM 3 G: 2; 1 INJECTION, POWDER, FOR SOLUTION INTRAMUSCULAR; INTRAVENOUS at 03:50

## 2023-11-12 RX ADMIN — ACETAMINOPHEN 975 MG: 325 TABLET ORAL at 09:30

## 2023-11-12 RX ADMIN — TESTOSTERONE CYPIONATE 50 MG: 100 INJECTION, SOLUTION INTRAMUSCULAR at 13:38

## 2023-11-12 RX ADMIN — VANCOMYCIN HYDROCHLORIDE 1.25 G: 1.25 INJECTION, POWDER, LYOPHILIZED, FOR SOLUTION INTRAVENOUS at 16:53

## 2023-11-12 RX ADMIN — AMPICILLIN SODIUM AND SULBACTAM SODIUM 3 G: 2; 1 INJECTION, POWDER, FOR SOLUTION INTRAMUSCULAR; INTRAVENOUS at 09:30

## 2023-11-12 RX ADMIN — ENOXAPARIN SODIUM 40 MG: 100 INJECTION SUBCUTANEOUS at 13:28

## 2023-11-12 RX ADMIN — ACETAMINOPHEN 975 MG: 325 TABLET ORAL at 05:19

## 2023-11-12 RX ADMIN — IBUPROFEN 600 MG: 600 TABLET ORAL at 11:26

## 2023-11-12 RX ADMIN — AMPICILLIN SODIUM AND SULBACTAM SODIUM 3 G: 2; 1 INJECTION, POWDER, FOR SOLUTION INTRAMUSCULAR; INTRAVENOUS at 15:51

## 2023-11-12 RX ADMIN — ACETAMINOPHEN 975 MG: 325 TABLET ORAL at 15:42

## 2023-11-12 RX ADMIN — VANCOMYCIN HYDROCHLORIDE 1.25 G: 1.25 INJECTION, POWDER, LYOPHILIZED, FOR SOLUTION INTRAVENOUS at 05:19

## 2023-11-12 RX ADMIN — ACETAMINOPHEN 975 MG: 325 TABLET ORAL at 21:17

## 2023-11-12 RX ADMIN — AMPICILLIN SODIUM AND SULBACTAM SODIUM 3 G: 2; 1 INJECTION, POWDER, FOR SOLUTION INTRAMUSCULAR; INTRAVENOUS at 21:17

## 2023-11-12 RX ADMIN — SENNOSIDES AND DOCUSATE SODIUM 2 TABLET: 8.6; 5 TABLET ORAL at 09:30

## 2023-11-12 ASSESSMENT — COGNITIVE AND FUNCTIONAL STATUS - GENERAL
CLIMB 3 TO 5 STEPS WITH RAILING: A LITTLE
WALKING IN HOSPITAL ROOM: A LITTLE
DAILY ACTIVITIY SCORE: 24
MOBILITY SCORE: 24
MOBILITY SCORE: 22
DAILY ACTIVITIY SCORE: 24

## 2023-11-12 ASSESSMENT — PAIN SCALES - GENERAL
PAINLEVEL_OUTOF10: 0 - NO PAIN

## 2023-11-12 ASSESSMENT — PAIN - FUNCTIONAL ASSESSMENT
PAIN_FUNCTIONAL_ASSESSMENT: 0-10
PAIN_FUNCTIONAL_ASSESSMENT: 0-10

## 2023-11-12 NOTE — CARE PLAN
Problem: Fall/Injury  Goal: Not fall by end of shift  Outcome: Progressing  Goal: Be free from injury by end of the shift  Outcome: Progressing  Goal: Verbalize understanding of personal risk factors for fall in the hospital  Outcome: Progressing  Goal: Verbalize understanding of risk factor reduction measures to prevent injury from fall in the home  Outcome: Progressing  Goal: Use assistive devices by end of the shift  Outcome: Progressing  Goal: Pace activities to prevent fatigue by end of the shift  Outcome: Progressing     Problem: Skin  Goal: Decreased wound size/increased tissue granulation at next dressing change  Outcome: Progressing  Goal: Participates in plan/prevention/treatment measures  Outcome: Progressing  Goal: Prevent/manage excess moisture  Outcome: Progressing  Goal: Prevent/minimize sheer/friction injuries  Outcome: Progressing  Goal: Promote/optimize nutrition  Outcome: Progressing  Goal: Promote skin healing  Outcome: Progressing     Problem: Pain - Adult  Goal: Verbalizes/displays adequate comfort level or baseline comfort level  Outcome: Progressing     Problem: Safety - Adult  Goal: Free from fall injury  Outcome: Progressing     Problem: Discharge Planning  Goal: Discharge to home or other facility with appropriate resources  Outcome: Progressing     Problem: Chronic Conditions and Co-morbidities  Goal: Patient's chronic conditions and co-morbidity symptoms are monitored and maintained or improved  Outcome: Progressing     Problem: Pain  Goal: My pain/discomfort is manageable  Outcome: Progressing     Problem: Psychosocial Needs  Goal: Demonstrates ability to cope with hospitalization/illness  Outcome: Progressing     Problem: Pain  Goal: Takes deep breaths with improved pain control throughout the shift  Outcome: Progressing  Goal: Turns in bed with improved pain control throughout the shift  Outcome: Progressing  Goal: Walks with improved pain control throughout the shift  Outcome:  Progressing  Goal: Performs ADL's with improved pain control throughout shift  Outcome: Progressing  Goal: Participates in PT with improved pain control throughout the shift  Outcome: Progressing  Goal: Free from opioid side effects throughout the shift  Outcome: Progressing  Goal: Free from acute confusion related to pain meds throughout the shift  Outcome: Progressing   The patient's goals for the shift include or/iv atb    The clinical goals for the shift include patient will remain afebrile by end of shift    Patient is progressing well, denies pain, tolerating dressing changes well, IV site changed to L forarm due to pain at prior IV site, otherwise no significant events overnight, patient resting with call light within reach at this time

## 2023-11-12 NOTE — PROGRESS NOTES
"Damian Galdamez is a 23 y.o. adult on day 6 of admission presenting with Wound infection.    Subjective   Patient concerned about swelling of phallus, particularly on right side. Worried about going home without VAC with concern for high risk of infection.     Objective     Physical Exam  Constitutional:       Appearance: Normal appearance.   HENT:      Mouth/Throat:      Mouth: Mucous membranes are moist.   Cardiovascular:      Rate and Rhythm: Normal rate and regular rhythm.   Pulmonary:      Effort: Pulmonary effort is normal.      Breath sounds: Normal breath sounds.   Abdominal:      Palpations: Abdomen is soft.   Genitourinary:     Comments: Kerlix removed - did not need saline to assist with any sticking today. Ventral part of phallus is open with red granulation tissue, no purulent drainage or odor. Neophallus is warm to touch, good cap refill, normal skin tone. Increased edema to right side of neophallus - no skin color changes  Skin:     General: Skin is warm and dry.   Neurological:      Mental Status: He is alert and oriented to person, place, and time. Mental status is at baseline.   Psychiatric:         Mood and Affect: Mood normal.         Behavior: Behavior normal.        Last Recorded Vitals  Blood pressure 115/80, pulse 76, temperature 36.8 °C (98.2 °F), temperature source Temporal, resp. rate 16, height 1.727 m (5' 7.99\"), weight 78.5 kg (173 lb 1 oz), SpO2 98 %.  Intake/Output last 3 Shifts:  I/O last 3 completed shifts:  In: 500 (6.4 mL/kg) [IV Piggyback:500]  Out: - (0 mL/kg)   Weight: 78.5 kg     Relevant Results  Scheduled medications  acetaminophen, 975 mg, oral, q6h  ampicillin-sulbactam, 3 g, intravenous, q6h  enoxaparin, 40 mg, subcutaneous, q24h  ibuprofen, 600 mg, oral, q6h KAYLEIGH  sennosides-docusate sodium, 2 tablet, oral, BID  vancomycin, 1,250 mg, intravenous, q12h      Continuous medications     PRN medications  PRN medications: ondansetron **OR** ondansetron, oxyCODONE, " oxyCODONE        Assessment/Plan   Principal Problem:    Wound infection      24 yo male with gender dysphoria s/p ALT thigh phalloplasty 6 weeks ago and ventral closure not achieved due to flap tightness. He returned on 11/1 and underwent excision of scar tissue with partial closure of flap with Kerecis mesh in place. Post-operative course as expected and patient was discharged home with incision care supplies and instructions. He returned to [facility] ER on Saturday (11/4) with new wound dehiscence. ER deferred care to /Dr. Castillo. Patient was directly admitted from clinic 11/7 for wound dehiscence and surgical site infection.   He is s/p debridement of infected and adhesed penile wound, use of ultrasonic Masonic's debridement, placement of Kerecis and wound VAC on 11/7 with Dr. Castillo.      Neophallus with leaking wound VAC, cannister <1/4 full on 11/10. VAC taken down at bedside, ventral part of phallus is open with red granulation tissue, no purulent drainage or odor, area covered with Xeroform and Kerlix. Neophallus warm to touch, good cap refill (<3 sec), normal skin tone. Unable to successfully achieve seal with attempts at VAC reapplication.     11/12: Increased edema mid-shaft on neophallus. No compromised blood flow     #surgical site infection  - Continue Vancomycin and Unasyn per ID recommendations  > Waiting on final cultures from surgery. OSH cultures grew:   Aerobic - 4+ enterococcus faecalis, 4+ pasteurella caris, 4+ klebsiella oxytoca, 4+   Citrobacter koseri, 4+ Ecoli, 4+ mixed skin cholo  Anaerobic - 4+ Prevotella bivia, 4+ Prevotella disiens  - Damp to dry Kerlix dressing changes TID  - Multimodal pain management  - Regular diet  - Continue bowel regimen  - OOB as tolerated     PPX: SCDs and subcutaneous Lovenox     Dispo: Home, 11/13 with plan for patient to follow-up with [facility] in [place], [place], clinicals were faxed 11/9. Paperwork submitted to [facility]/  Liaison for review and processing - VAC bedside; however, need to re-attempt application tomorrow. Consider wound care RN assistance pending discussion with Dr. Castillo        Still awaiting final ID recs prior to discharge     I spent 20 minutes in the professional and overall care of this patient.      Jesika Mendoza, APRN-CNP

## 2023-11-12 NOTE — CARE PLAN
Problem: Fall/Injury  Goal: Not fall by end of shift  Outcome: Progressing  Goal: Be free from injury by end of the shift  Outcome: Progressing  Goal: Verbalize understanding of personal risk factors for fall in the hospital  Outcome: Progressing  Goal: Verbalize understanding of risk factor reduction measures to prevent injury from fall in the home  Outcome: Progressing  Goal: Use assistive devices by end of the shift  Outcome: Progressing  Goal: Pace activities to prevent fatigue by end of the shift  Outcome: Progressing     Problem: Skin  Goal: Decreased wound size/increased tissue granulation at next dressing change  Outcome: Progressing  Goal: Participates in plan/prevention/treatment measures  Outcome: Progressing  Goal: Prevent/manage excess moisture  Outcome: Progressing  Goal: Prevent/minimize sheer/friction injuries  Outcome: Progressing  Goal: Promote/optimize nutrition  Outcome: Progressing  Goal: Promote skin healing  Outcome: Progressing     Problem: Pain - Adult  Goal: Verbalizes/displays adequate comfort level or baseline comfort level  Outcome: Progressing     Problem: Safety - Adult  Goal: Free from fall injury  Outcome: Progressing     Problem: Discharge Planning  Goal: Discharge to home or other facility with appropriate resources  Outcome: Progressing     Problem: Chronic Conditions and Co-morbidities  Goal: Patient's chronic conditions and co-morbidity symptoms are monitored and maintained or improved  Outcome: Progressing     Problem: Pain  Goal: My pain/discomfort is manageable  Outcome: Progressing     Problem: Psychosocial Needs  Goal: Demonstrates ability to cope with hospitalization/illness  Outcome: Progressing     Problem: Pain  Goal: Takes deep breaths with improved pain control throughout the shift  Outcome: Progressing  Goal: Turns in bed with improved pain control throughout the shift  Outcome: Progressing  Goal: Walks with improved pain control throughout the shift  Outcome:  Progressing  Goal: Performs ADL's with improved pain control throughout shift  Outcome: Progressing  Goal: Participates in PT with improved pain control throughout the shift  Outcome: Progressing  Goal: Free from opioid side effects throughout the shift  Outcome: Progressing  Goal: Free from acute confusion related to pain meds throughout the shift  Outcome: Progressing   The patient's goals for the shift include or/iv atb    The clinical goals for the shift include patient will remain afebrile by end of shift

## 2023-11-13 VITALS
RESPIRATION RATE: 16 BRPM | BODY MASS INDEX: 26.23 KG/M2 | OXYGEN SATURATION: 99 % | WEIGHT: 173.06 LBS | TEMPERATURE: 98.2 F | DIASTOLIC BLOOD PRESSURE: 61 MMHG | HEART RATE: 63 BPM | SYSTOLIC BLOOD PRESSURE: 121 MMHG | HEIGHT: 68 IN

## 2023-11-13 LAB
HOLD SPECIMEN: NORMAL
VANCOMYCIN SERPL-MCNC: 7.5 UG/ML (ref 5–20)

## 2023-11-13 PROCEDURE — 36415 COLL VENOUS BLD VENIPUNCTURE: CPT | Performed by: INTERNAL MEDICINE

## 2023-11-13 PROCEDURE — 2500000001 HC RX 250 WO HCPCS SELF ADMINISTERED DRUGS (ALT 637 FOR MEDICARE OP): Performed by: NURSE PRACTITIONER

## 2023-11-13 PROCEDURE — 99239 HOSP IP/OBS DSCHRG MGMT >30: CPT | Performed by: NURSE PRACTITIONER

## 2023-11-13 PROCEDURE — 80202 ASSAY OF VANCOMYCIN: CPT | Performed by: INTERNAL MEDICINE

## 2023-11-13 PROCEDURE — 36415 COLL VENOUS BLD VENIPUNCTURE: CPT | Performed by: UROLOGY

## 2023-11-13 PROCEDURE — 2500000004 HC RX 250 GENERAL PHARMACY W/ HCPCS (ALT 636 FOR OP/ED)

## 2023-11-13 RX ORDER — SODIUM CHLORIDE 0.9 G/100ML
1000 IRRIGANT IRRIGATION ONCE
Status: DISCONTINUED | OUTPATIENT
Start: 2023-11-13 | End: 2023-11-13 | Stop reason: HOSPADM

## 2023-11-13 RX ORDER — ACETAMINOPHEN 500 MG
1000 TABLET ORAL EVERY 6 HOURS PRN
Qty: 30 TABLET | Refills: 0
Start: 2023-11-13 | End: 2024-04-09 | Stop reason: ALTCHOICE

## 2023-11-13 RX ORDER — OXYCODONE HYDROCHLORIDE 5 MG/1
5 TABLET ORAL EVERY 6 HOURS PRN
Qty: 28 TABLET | Refills: 0 | Status: SHIPPED | OUTPATIENT
Start: 2023-11-13 | End: 2023-11-20

## 2023-11-13 RX ORDER — SODIUM CHLORIDE 0.9 G/100ML
1000 IRRIGANT IRRIGATION 3 TIMES DAILY
Qty: 500 ML | Refills: 6 | Status: SHIPPED | OUTPATIENT
Start: 2023-11-13 | End: 2024-04-05 | Stop reason: HOSPADM

## 2023-11-13 RX ORDER — AMOXICILLIN AND CLAVULANATE POTASSIUM 875; 125 MG/1; MG/1
1 TABLET, FILM COATED ORAL EVERY 12 HOURS
Qty: 20 TABLET | Refills: 0 | Status: SHIPPED | OUTPATIENT
Start: 2023-11-13 | End: 2023-11-23

## 2023-11-13 RX ORDER — AMPICILLIN AND SULBACTAM 2; 1 G/1; G/1
3 INJECTION, POWDER, FOR SOLUTION INTRAMUSCULAR; INTRAVENOUS 4 TIMES DAILY
Qty: 224 ML | Refills: 0 | Status: SHIPPED | OUTPATIENT
Start: 2023-11-13 | End: 2023-11-13 | Stop reason: SDUPTHER

## 2023-11-13 RX ORDER — IBUPROFEN 600 MG/1
600 TABLET ORAL EVERY 6 HOURS SCHEDULED
Start: 2023-11-13 | End: 2024-04-09 | Stop reason: ALTCHOICE

## 2023-11-13 RX ORDER — AMOXICILLIN 250 MG
2 CAPSULE ORAL 2 TIMES DAILY
Start: 2023-11-13 | End: 2024-04-05 | Stop reason: HOSPADM

## 2023-11-13 RX ORDER — AMPICILLIN AND SULBACTAM 2; 1 G/1; G/1
3 INJECTION, POWDER, FOR SOLUTION INTRAMUSCULAR; INTRAVENOUS 4 TIMES DAILY
Qty: 224 ML | Refills: 0 | Status: SHIPPED | OUTPATIENT
Start: 2023-11-13 | End: 2023-11-13 | Stop reason: HOSPADM

## 2023-11-13 RX ADMIN — ACETAMINOPHEN 975 MG: 325 TABLET ORAL at 04:21

## 2023-11-13 RX ADMIN — VANCOMYCIN HYDROCHLORIDE 1.25 G: 1.25 INJECTION, POWDER, LYOPHILIZED, FOR SOLUTION INTRAVENOUS at 05:07

## 2023-11-13 RX ADMIN — AMPICILLIN SODIUM AND SULBACTAM SODIUM 3 G: 2; 1 INJECTION, POWDER, FOR SOLUTION INTRAMUSCULAR; INTRAVENOUS at 14:29

## 2023-11-13 RX ADMIN — AMPICILLIN SODIUM AND SULBACTAM SODIUM 3 G: 2; 1 INJECTION, POWDER, FOR SOLUTION INTRAMUSCULAR; INTRAVENOUS at 04:21

## 2023-11-13 RX ADMIN — ACETAMINOPHEN 975 MG: 325 TABLET ORAL at 11:01

## 2023-11-13 RX ADMIN — AMPICILLIN SODIUM AND SULBACTAM SODIUM 3 G: 2; 1 INJECTION, POWDER, FOR SOLUTION INTRAMUSCULAR; INTRAVENOUS at 08:57

## 2023-11-13 RX ADMIN — SENNOSIDES AND DOCUSATE SODIUM 2 TABLET: 8.6; 5 TABLET ORAL at 08:57

## 2023-11-13 ASSESSMENT — COGNITIVE AND FUNCTIONAL STATUS - GENERAL
MOBILITY SCORE: 24
DAILY ACTIVITIY SCORE: 24

## 2023-11-13 ASSESSMENT — PAIN SCALES - GENERAL
PAINLEVEL_OUTOF10: 0 - NO PAIN
PAINLEVEL_OUTOF10: 0 - NO PAIN

## 2023-11-13 ASSESSMENT — PAIN - FUNCTIONAL ASSESSMENT: PAIN_FUNCTIONAL_ASSESSMENT: 0-10

## 2023-11-13 NOTE — CONSULTS
Vancomycin Dosing by Pharmacy- Cessation of Therapy    Consult to pharmacy for vancomycin dosing has been discontinued by the prescriber, pharmacy will sign off at this time.    Please call pharmacy if there are further questions or re-enter a consult if vancomycin is resumed.     Nori Townsend, PharmD

## 2023-11-13 NOTE — CARE PLAN
Problem: Fall/Injury  Goal: Not fall by end of shift  11/13/2023 1608 by Liliana Cox RN  Outcome: Met  11/13/2023 0752 by Liliana Cox RN  Outcome: Progressing  Goal: Be free from injury by end of the shift  11/13/2023 1608 by Liliana Cox RN  Outcome: Met  11/13/2023 0752 by Liliana Cox RN  Outcome: Progressing  Goal: Verbalize understanding of personal risk factors for fall in the hospital  11/13/2023 1608 by Liliana Cox RN  Outcome: Met  11/13/2023 0752 by Liliana Cox RN  Outcome: Progressing  Goal: Verbalize understanding of risk factor reduction measures to prevent injury from fall in the home  11/13/2023 1608 by Liliana Cox RN  Outcome: Met  11/13/2023 0752 by Liliana Cox RN  Outcome: Progressing  Goal: Use assistive devices by end of the shift  11/13/2023 1608 by Liliana Cox RN  Outcome: Met  11/13/2023 0752 by Liliana Cox RN  Outcome: Progressing  Goal: Pace activities to prevent fatigue by end of the shift  11/13/2023 1608 by Liliana Cox RN  Outcome: Met  11/13/2023 0752 by Liliana Cxo RN  Outcome: Progressing     Problem: Skin  Goal: Decreased wound size/increased tissue granulation at next dressing change  11/13/2023 1608 by Liliana Cox RN  Outcome: Met  11/13/2023 0752 by Liliana Cox RN  Outcome: Progressing  Goal: Participates in plan/prevention/treatment measures  11/13/2023 1608 by Liliana Cox RN  Outcome: Met  11/13/2023 0752 by Liliana Cox RN  Outcome: Progressing  Goal: Prevent/manage excess moisture  11/13/2023 1608 by Liliana Cox RN  Outcome: Met  11/13/2023 0752 by Liliana Cox RN  Outcome: Progressing  Goal: Prevent/minimize sheer/friction injuries  11/13/2023 1608 by Liliana Cox RN  Outcome: Met  11/13/2023 0752 by Liliana Cox RN  Outcome: Progressing  Goal: Promote/optimize nutrition  11/13/2023 1608 by Liliana Cox RN  Outcome: Met  11/13/2023 0752  by Liliana Cox RN  Outcome: Progressing  Goal: Promote skin healing  11/13/2023 1608 by Liliana Cox RN  Outcome: Met  11/13/2023 0752 by Liliana Cox RN  Outcome: Progressing     Problem: Pain - Adult  Goal: Verbalizes/displays adequate comfort level or baseline comfort level  11/13/2023 1608 by Liliana Cox RN  Outcome: Met  11/13/2023 0752 by Liliana Cox RN  Outcome: Progressing     Problem: Safety - Adult  Goal: Free from fall injury  11/13/2023 1608 by Liliana Cox RN  Outcome: Met  11/13/2023 0752 by Liliana Cox RN  Outcome: Progressing     Problem: Discharge Planning  Goal: Discharge to home or other facility with appropriate resources  11/13/2023 1608 by Liliana Cox RN  Outcome: Met  11/13/2023 0752 by Liliana Cox RN  Outcome: Progressing     Problem: Chronic Conditions and Co-morbidities  Goal: Patient's chronic conditions and co-morbidity symptoms are monitored and maintained or improved  11/13/2023 1608 by Liliana Cox RN  Outcome: Met  11/13/2023 0752 by Liliana Cox RN  Outcome: Progressing     Problem: Pain  Goal: My pain/discomfort is manageable  11/13/2023 1608 by Liliana Cox RN  Outcome: Met  11/13/2023 0752 by Liliana Cox RN  Outcome: Progressing     Problem: Psychosocial Needs  Goal: Demonstrates ability to cope with hospitalization/illness  11/13/2023 1608 by Liliana Cox RN  Outcome: Met  11/13/2023 0752 by Liliana Cox RN  Outcome: Progressing     Problem: Pain  Goal: Takes deep breaths with improved pain control throughout the shift  11/13/2023 1608 by Liliana Cox RN  Outcome: Met  11/13/2023 0752 by Liliana Cox RN  Outcome: Progressing  Goal: Turns in bed with improved pain control throughout the shift  11/13/2023 1608 by Liliana Cox RN  Outcome: Met  11/13/2023 0752 by Liliana Cox RN  Outcome: Progressing  Goal: Walks with improved pain control throughout the  shift  11/13/2023 1608 by Liliana Cox RN  Outcome: Met  11/13/2023 0752 by Liliana Cox RN  Outcome: Progressing  Goal: Performs ADL's with improved pain control throughout shift  11/13/2023 1608 by Liliana Cox RN  Outcome: Met  11/13/2023 0752 by Liliana Cox RN  Outcome: Progressing  Goal: Participates in PT with improved pain control throughout the shift  11/13/2023 1608 by Liliana Cox RN  Outcome: Met  11/13/2023 0752 by Liliana Cox RN  Outcome: Progressing  Goal: Free from opioid side effects throughout the shift  11/13/2023 1608 by Liliana Cox RN  Outcome: Met  11/13/2023 0752 by Liliana Cox RN  Outcome: Progressing  Goal: Free from acute confusion related to pain meds throughout the shift  11/13/2023 1608 by Liliana Cox RN  Outcome: Met  11/13/2023 0752 by Liliana Cox RN  Outcome: Progressing

## 2023-11-13 NOTE — CARE PLAN
Problem: Fall/Injury  Goal: Not fall by end of shift  Outcome: Progressing  Goal: Be free from injury by end of the shift  Outcome: Progressing  Goal: Verbalize understanding of personal risk factors for fall in the hospital  Outcome: Progressing  Goal: Verbalize understanding of risk factor reduction measures to prevent injury from fall in the home  Outcome: Progressing  Goal: Use assistive devices by end of the shift  Outcome: Progressing  Goal: Pace activities to prevent fatigue by end of the shift  Outcome: Progressing     Problem: Skin  Goal: Decreased wound size/increased tissue granulation at next dressing change  Outcome: Progressing  Goal: Participates in plan/prevention/treatment measures  Outcome: Progressing  Goal: Prevent/manage excess moisture  Outcome: Progressing  Goal: Prevent/minimize sheer/friction injuries  Outcome: Progressing  Goal: Promote/optimize nutrition  Outcome: Progressing  Goal: Promote skin healing  Outcome: Progressing     Problem: Pain - Adult  Goal: Verbalizes/displays adequate comfort level or baseline comfort level  Outcome: Progressing     Problem: Safety - Adult  Goal: Free from fall injury  Outcome: Progressing     Problem: Discharge Planning  Goal: Discharge to home or other facility with appropriate resources  Outcome: Progressing     Problem: Chronic Conditions and Co-morbidities  Goal: Patient's chronic conditions and co-morbidity symptoms are monitored and maintained or improved  Outcome: Progressing     Problem: Pain  Goal: My pain/discomfort is manageable  Outcome: Progressing     Problem: Psychosocial Needs  Goal: Demonstrates ability to cope with hospitalization/illness  Outcome: Progressing     Problem: Pain  Goal: Takes deep breaths with improved pain control throughout the shift  Outcome: Progressing  Goal: Turns in bed with improved pain control throughout the shift  Outcome: Progressing  Goal: Walks with improved pain control throughout the shift  Outcome:  Progressing  Goal: Performs ADL's with improved pain control throughout shift  Outcome: Progressing  Goal: Participates in PT with improved pain control throughout the shift  Outcome: Progressing  Goal: Free from opioid side effects throughout the shift  Outcome: Progressing  Goal: Free from acute confusion related to pain meds throughout the shift  Outcome: Progressing

## 2023-11-13 NOTE — DISCHARGE SUMMARY
Discharge Diagnosis  Wound infection    Issues Requiring Follow-Up  Wound Check, Post-op visit     Test Results Pending At Discharge  Pending Labs       Order Current Status    Fungal Culture/Smear Collected (11/07/23 2548)            Hospital Course   22 yo male with gender dysphoria s/p ALT thigh phalloplasty 6 weeks ago and ventral closure not achieved due to flap tightness. He returned on 11/1 and underwent excision of scar tissue with partial closure of flap with Kerecis mesh in place. Post-operative course as expected and patient was discharged home with incision care supplies and instructions. He returned to [facility] ER on Saturday with new wound dehiscence who deferred care to us. He was directly admitted from clinic 11/7 for wound dehiscence and  surgical site infection. He is POD 1 for debridement of infected and adhesed penile wound, use of ultrasonic Masonic's debridement, placement of Kerecis and wound VAC with Dr. Castillo.     Wound VAC started to leak around neophallus POD 3. Unable to get appropriate seal for VAC. POD 3 started damp to dry dressing changes 3 times per day. Wound bed with red granulation tissue with no purulent drainage or odor. Left base of phallus with open area with red granulation tissue and kerecis mesh with no purulent drainage or odor. Neophallus is warm to touch with good cap refill and normal skin tone. Area packed with damp to dry dressing and wrapped with dry kerlix and mesh underwear. Pain has been well controlled. Patient has been receiving IV Vancomycin and Unasyn since admission. Infectious disease was consulted. Wound culture from surgery revealed mixed gram positive and gram negative bacteria. Patient will be discharged home on Augmentin x 10 days.     Pertinent Physical Exam At Time of Discharge  Physical Exam  Constitutional:       Appearance: Normal appearance.   HENT:      Mouth/Throat:      Mouth: Mucous membranes are moist.   Cardiovascular:      Rate and  Rhythm: Normal rate and regular rhythm.   Pulmonary:      Effort: Pulmonary effort is normal.      Breath sounds: Normal breath sounds.   Abdominal:      Palpations: Abdomen is soft.   Genitourinary:     Comments: Ventral aspect of phallus wound bed with red granulation tissue with no purulent drainage or odor. Neophallus is warm to touch with good cap refill and normal skin tone. Left base of phallus open area with red granulation tissue and Kerecis mesh with no purulent drainage or odor. Areas packed with damp to dry kerlix and wrapped with dry kerlix dressing and mesh underwear.   Skin:     General: Skin is warm and dry.   Neurological:      Mental Status: He is alert and oriented to person, place, and time. Mental status is at baseline.   Psychiatric:         Mood and Affect: Mood normal.         Behavior: Behavior normal.         Home Medications     Medication List      START taking these medications     amoxicillin-pot clavulanate 875-125 mg tablet; Commonly known as:   Augmentin; Take 1 tablet (875 mg) by mouth every 12 hours for 10 days.   ibuprofen 600 mg tablet; Take 1 tablet (600 mg) by mouth every 6 hours.   sennosides-docusate sodium 8.6-50 mg tablet; Commonly known as:   Rosy-Colace; Take 2 tablets by mouth 2 times a day.   sodium chloride 0.9 % irrigation solution; Irrigate with 1,000 mL as   directed 3 times a day.     CONTINUE taking these medications     acetaminophen 500 mg tablet; Commonly known as: Tylenol; Take 2 tablets   (1,000 mg) by mouth every 6 hours if needed for mild pain (1 - 3).   oxyCODONE 5 mg immediate release tablet; Commonly known as: Roxicodone;   Take 1 tablet (5 mg) by mouth every 6 hours if needed for severe pain (7 -   10) for up to 7 days.   testosterone cypionate 100 mg/mL injection; Commonly known as:   Depo-Testosterone     STOP taking these medications     gabapentin 100 mg capsule; Commonly known as: Neurontin   sulfamethoxazole-trimethoprim 800-160 mg tablet;  Commonly known as:   Bactrim DS       Outpatient Follow-Up  Future Appointments   Date Time Provider Department Center   12/11/2023  1:30 PM Bladimir Castillo MD [id number] [place]       Dee Dee Wahl, ADRIANO-CNP

## 2023-11-13 NOTE — CARE PLAN
Problem: Fall/Injury  Goal: Not fall by end of shift  Outcome: Progressing  Goal: Be free from injury by end of the shift  Outcome: Progressing  Goal: Verbalize understanding of personal risk factors for fall in the hospital  Outcome: Progressing  Goal: Verbalize understanding of risk factor reduction measures to prevent injury from fall in the home  Outcome: Progressing  Goal: Use assistive devices by end of the shift  Outcome: Progressing  Goal: Pace activities to prevent fatigue by end of the shift  Outcome: Progressing     Problem: Skin  Goal: Decreased wound size/increased tissue granulation at next dressing change  Outcome: Progressing  Goal: Participates in plan/prevention/treatment measures  Outcome: Progressing  Goal: Prevent/manage excess moisture  Outcome: Progressing  Goal: Prevent/minimize sheer/friction injuries  Outcome: Progressing  Goal: Promote/optimize nutrition  Outcome: Progressing  Goal: Promote skin healing  Outcome: Progressing     Problem: Pain - Adult  Goal: Verbalizes/displays adequate comfort level or baseline comfort level  Outcome: Progressing     Problem: Safety - Adult  Goal: Free from fall injury  Outcome: Progressing     Problem: Discharge Planning  Goal: Discharge to home or other facility with appropriate resources  Outcome: Progressing     Problem: Chronic Conditions and Co-morbidities  Goal: Patient's chronic conditions and co-morbidity symptoms are monitored and maintained or improved  Outcome: Progressing     Problem: Pain  Goal: My pain/discomfort is manageable  Outcome: Progressing     Problem: Psychosocial Needs  Goal: Demonstrates ability to cope with hospitalization/illness  Outcome: Progressing     Problem: Pain  Goal: Takes deep breaths with improved pain control throughout the shift  Outcome: Progressing  Goal: Turns in bed with improved pain control throughout the shift  Outcome: Progressing  Goal: Walks with improved pain control throughout the shift  Outcome:  Progressing  Goal: Performs ADL's with improved pain control throughout shift  Outcome: Progressing  Goal: Participates in PT with improved pain control throughout the shift  Outcome: Progressing  Goal: Free from opioid side effects throughout the shift  Outcome: Progressing  Goal: Free from acute confusion related to pain meds throughout the shift  Outcome: Progressing   The patient's goals for the shift include or/iv atb    The clinical goals for the shift include patient will remain free from pain by end of shift    Patient progressing well, denies pain, tolerating dressing changes well, no significant events overnight, patient resting with call light within reach at this time

## 2023-11-13 NOTE — PROGRESS NOTES
"Infectious disease progress note  Subjective   Infected phalloplasty with polymicrobial organisms  Wound dehiscence    Antibiotics  Unasyn day 7 out of 14 days  Vancomycin discontinued  Objective   Range of Vitals (last 24 hours)  Heart Rate:  [59-86]   Temp:  [36.8 °C (98.2 °F)-37.1 °C (98.8 °F)]   Resp:  [15-16]   BP: (115-124)/(61-70)   SpO2:  [93 %-99 %]   Daily Weight  11/11/23 : 78.5 kg (173 lb 1 oz)    Body mass index is 26.32 kg/m².      Physical Exam  Pictures were reviewed wound healing nicely      Relevant Results  Labs  Lab Results   Component Value Date    WBC 6.5 11/11/2023    HGB 12.3 11/11/2023    HCT 38.2 11/11/2023    MCV 93 11/11/2023     11/11/2023     Lab Results   Component Value Date    GLUCOSE 82 11/11/2023    CALCIUM 9.1 11/11/2023     11/11/2023    K 4.3 11/11/2023    CO2 29 11/11/2023     11/11/2023    BUN 14 11/11/2023    CREATININE 0.69 11/11/2023   ESR: --No results found for: \"SEDRATE\"No results found for: \"CRP\"No results found for: \"ALT\", \"AST\", \"GGT\", \"ALKPHOS\", \"BILITOT\"    Microbiology  Reviewed by me  11-7-2023 wound cultures from the OR just show mixed gram-positive and gram-negative bacteria  Imaging  Reviewed by me    Assessment/Plan   1.  Filled out the prescription for Unasyn 3 g IV every 6 for 7 more days along with weekly labs CBC, BMP to be faxed to [phone]  2.  Patient will be following up with Dr. Castillo and the wound center in [facility]  Okay to discharge from an infectious disease standpoint    I reviewed and interpreted all lab test imaging studies and documentations from other healthcare providers  I am monitoring for antibiotic side effects and toxicity     Eugenia Scott MD  "

## 2023-11-21 LAB
LABORATORY COMMENT REPORT: NORMAL
PATH REPORT.FINAL DX SPEC: NORMAL
PATH REPORT.GROSS SPEC: NORMAL
PATH REPORT.RELEVANT HX SPEC: NORMAL
PATH REPORT.TOTAL CANCER: NORMAL

## 2023-12-11 ENCOUNTER — OFFICE VISIT (OUTPATIENT)
Dept: UROLOGY | Facility: CLINIC | Age: 23
End: 2023-12-11
Payer: COMMERCIAL

## 2023-12-11 VITALS
SYSTOLIC BLOOD PRESSURE: 113 MMHG | BODY MASS INDEX: 27.89 KG/M2 | HEART RATE: 80 BPM | TEMPERATURE: 98.4 F | WEIGHT: 184 LBS | HEIGHT: 68 IN | DIASTOLIC BLOOD PRESSURE: 81 MMHG

## 2023-12-11 DIAGNOSIS — T81.30XA WOUND DEHISCENCE: Primary | ICD-10-CM

## 2023-12-11 PROCEDURE — 99024 POSTOP FOLLOW-UP VISIT: CPT | Performed by: UROLOGY

## 2023-12-11 PROCEDURE — 1036F TOBACCO NON-USER: CPT | Performed by: UROLOGY

## 2023-12-11 NOTE — PROGRESS NOTES
"Subjective   Patient ID: Damian Galdamez is a 23 y.o. adult who presents for Follow-up.    Damian Galdamez is a 23 year old trans-male with a significant past medical history of anxiety and ADD who was diagnosed with gender dysphoria and is presenting today for a follow up of his left ALT phalloplasty performed on 8/9/23.          Review of Systems    Objective   /81 (BP Location: Left arm, Patient Position: Sitting, BP Cuff Size: Adult)   Pulse 80   Temp 36.9 °C (98.4 °F) (Temporal)   Ht 1.727 m (5' 8\")   Wt 83.5 kg (184 lb)   BMI 27.98 kg/m²     Physical Exam    Constitutional: Patient appears well-developed and well-nourished. No acute distress.    Pulmonary/Chest: No respiratory distress.   Abdominal: Normal.  Genitourinary: The under surface of phallus is healing well, significant reduction in size of the wound was noted, 3 x 5 cm wound with healthy granulation bed. Ventral contracture and curvature associated with wound.  Integumentary: No rash or lesions.  Musculoskeletal: Normal range of motion.    Neurological: Alert and oriented to person, place, and time.  Psychiatric: Normal mood and affect. Behavior is normal. Thought content normal.       Assessment/Plan        I examined the patient today and his wound seems to be healing well. Slight ventral contracture and curvature was noted associated with the wound.   I advised him to continue wound care.   We will see him back sometime before his next scheduled surgery in April and we'll decide then as to what to include in his procedure from glansplasty, vaginectomy and scrotoplasty.      Scribe Attestation  By signing my name below, I, Arabella Khan   attest that this documentation has been prepared under the direction and in the presence of Bladimir Castillo MD.    "

## 2024-01-12 DIAGNOSIS — F64.9 GENDER DYSPHORIA: Primary | ICD-10-CM

## 2024-01-15 RX ORDER — GABAPENTIN 300 MG/1
600 CAPSULE ORAL ONCE
Status: CANCELLED | OUTPATIENT
Start: 2024-01-15 | End: 2024-01-15

## 2024-01-15 RX ORDER — ACETAMINOPHEN 325 MG/1
975 TABLET ORAL ONCE
Status: CANCELLED | OUTPATIENT
Start: 2024-01-15 | End: 2024-01-15

## 2024-01-15 RX ORDER — CELECOXIB 50 MG/1
400 CAPSULE ORAL ONCE
Status: CANCELLED | OUTPATIENT
Start: 2024-01-15 | End: 2024-01-15

## 2024-01-15 RX ORDER — SODIUM CHLORIDE 9 MG/ML
100 INJECTION, SOLUTION INTRAVENOUS CONTINUOUS
Status: CANCELLED | OUTPATIENT
Start: 2024-04-04

## 2024-02-19 ENCOUNTER — OFFICE VISIT (OUTPATIENT)
Dept: UROLOGY | Facility: CLINIC | Age: 24
End: 2024-02-19
Payer: COMMERCIAL

## 2024-02-19 ENCOUNTER — APPOINTMENT (OUTPATIENT)
Dept: UROLOGY | Facility: CLINIC | Age: 24
End: 2024-02-19
Payer: COMMERCIAL

## 2024-02-19 VITALS
HEIGHT: 68 IN | SYSTOLIC BLOOD PRESSURE: 161 MMHG | BODY MASS INDEX: 28.22 KG/M2 | DIASTOLIC BLOOD PRESSURE: 77 MMHG | WEIGHT: 186.2 LBS | HEART RATE: 72 BPM

## 2024-02-19 DIAGNOSIS — F64.9 GENDER DYSPHORIA: Primary | ICD-10-CM

## 2024-02-19 PROCEDURE — 99215 OFFICE O/P EST HI 40 MIN: CPT | Performed by: UROLOGY

## 2024-02-19 PROCEDURE — 1036F TOBACCO NON-USER: CPT | Performed by: UROLOGY

## 2024-02-19 NOTE — PROGRESS NOTES
"Subjective   Patient ID: Damian Galdamez is a 23 y.o. adult who presents for 4 to 5 weeks before surgery and Follow-up.    INTERVAL HISTORY:  The patient feels well on his visit today. He is in a pleasant mood and states that his incision is well healed on the ventral aspect, and he has tactile sensation, however he notices ventral curvature of phallus 90 degrees down.    PREVIOUS HPI:  Damian Galdamez is a 23 year old trans-male with a significant past medical history of anxiety and ADD who was diagnosed with gender dysphoria and is presenting today for a follow up of his left ALT phalloplasty performed on 8/9/23.          Review of Systems    Objective   /77   Pulse 72   Ht 1.727 m (5' 8\")   Wt 84.5 kg (186 lb 3.2 oz)   BMI 28.31 kg/m²     Physical Exam    Constitutional: Patient appears well-developed and well-nourished. No acute distress.     Pulmonary/Chest: Effort normal. No respiratory distress.   Abdominal: soft and non tender.  : Phallus is well healed, there is a complex stellate scar on the ventral aspect of the phallus which is causing a downward curvature of the phallus.  Integumentary: No rash or lesions.  Psychiatric: Normal mood and affect. Behavior is normal. Thought content normal.      Assessment/Plan       Discussion:  I examined the patient and discussed his upcoming procedure with him. I informed him of our plan which is as follows:    The patient has stage 2 phalloplasty scheduled for April. In his case, the phalloplasty will involve vaginectomy, clitoral burial, and scrotoplasty. We will not perform glansplasty at this time.   We will do a scar revision and an orthoplasty to straighten out his phallus at the same time    The patient agrees and we will see him in April.     Scribe Attestation    By signing my name below, I, Arabella Khan attest that this documentation has been prepared under the direction and in the presence of Bladimir Castillo MD.   All medical record entries " made by the Scribe were at my direction or personally dictated by me. I have reviewed the chart and agree that the record accurately reflects my personal performance of the history, physical exam, discussion and plan.

## 2024-03-14 DIAGNOSIS — Z01.818 PREOPERATIVE CLEARANCE: ICD-10-CM

## 2024-04-03 NOTE — PREPROCEDURE INSTRUCTIONS
Current Medications   Medication Instructions    testosterone cypionate (Depo-Testosterone) 100 mg/mL injection Take as directed            NPO Instructions:    Do not eat any food after midnight the night before your surgery/procedure.  You may have clear liquids until TWO hours 7:45 am. This includes water, black tea/coffee, (no milk or cream) apple juice and electrolyte drinks (Gatorade).    Additional Instructions: Arrive in registration at 9:45 am for 11:15 am surgery.    Day of Surgery:    Enter through main entrance of main hospital building, located at [address] [facility]. Proceed to registration, located in the back right hand corner. You will need your ID and insurance card for registration. Please ensure you have a responsible adult to drive you home.     Shower the night before your procedure and morning of your procedure with Hibiclens. After you shower avoid lotions, powders, deodorants or anything applied to the skin. If you wear contacts or glasses, wear the glasses. If you do not have glasses, please bring a case for your contacts. You may wear hearing aids and dentures, bring a case for them or we will provide one. Make sure you wear something loose and comfortable. Keep in mind your surgery type and wear something that will accommodate incisions or bandages. Please remove all jewelry.     For further questions Brenda MANN can be contacted at [phone] between 7AM-3PM.

## 2024-04-04 ENCOUNTER — ANESTHESIA (OUTPATIENT)
Dept: OPERATING ROOM | Facility: HOSPITAL | Age: 24
DRG: 876 | End: 2024-04-04
Payer: COMMERCIAL

## 2024-04-04 ENCOUNTER — ANESTHESIA EVENT (OUTPATIENT)
Dept: OPERATING ROOM | Facility: HOSPITAL | Age: 24
DRG: 876 | End: 2024-04-04
Payer: COMMERCIAL

## 2024-04-04 ENCOUNTER — HOSPITAL ENCOUNTER (INPATIENT)
Facility: HOSPITAL | Age: 24
LOS: 1 days | Discharge: HOME | DRG: 876 | End: 2024-04-05
Attending: UROLOGY | Admitting: UROLOGY
Payer: COMMERCIAL

## 2024-04-04 DIAGNOSIS — F64.9 GENDER DYSPHORIA: Primary | ICD-10-CM

## 2024-04-04 DIAGNOSIS — Z78.9 PRESENCE OF SURGICAL INCISION: ICD-10-CM

## 2024-04-04 DIAGNOSIS — G89.18 ACUTE POSTOPERATIVE PAIN: ICD-10-CM

## 2024-04-04 DIAGNOSIS — K59.03 CONSTIPATION DUE TO OPIOID THERAPY: ICD-10-CM

## 2024-04-04 DIAGNOSIS — Z79.2 NEED FOR ANTIBIOTIC PROPHYLAXIS FOR SURGICAL PROCEDURE: ICD-10-CM

## 2024-04-04 DIAGNOSIS — T40.2X5A CONSTIPATION DUE TO OPIOID THERAPY: ICD-10-CM

## 2024-04-04 PROCEDURE — 0VNS0ZZ RELEASE PENIS, OPEN APPROACH: ICD-10-PCS | Performed by: SURGERY

## 2024-04-04 PROCEDURE — 3700000002 HC GENERAL ANESTHESIA TIME - EACH INCREMENTAL 1 MINUTE: Performed by: UROLOGY

## 2024-04-04 PROCEDURE — 14302 TIS TRNFR ADDL 30 SQ CM: CPT | Performed by: SURGERY

## 2024-04-04 PROCEDURE — 2500000002 HC RX 250 W HCPCS SELF ADMINISTERED DRUGS (ALT 637 FOR MEDICARE OP, ALT 636 FOR OP/ED): Performed by: NURSE PRACTITIONER

## 2024-04-04 PROCEDURE — 7100000002 HC RECOVERY ROOM TIME - EACH INCREMENTAL 1 MINUTE: Performed by: UROLOGY

## 2024-04-04 PROCEDURE — 57110 VAGNC COMPL RMVL VAG WALL: CPT | Performed by: SURGERY

## 2024-04-04 PROCEDURE — 14301 TIS TRNFR ANY 30.1-60 SQ CM: CPT | Performed by: UROLOGY

## 2024-04-04 PROCEDURE — 1100000001 HC PRIVATE ROOM DAILY

## 2024-04-04 PROCEDURE — 2780000003 HC OR 278 NO HCPCS: Performed by: UROLOGY

## 2024-04-04 PROCEDURE — 2500000001 HC RX 250 WO HCPCS SELF ADMINISTERED DRUGS (ALT 637 FOR MEDICARE OP): Performed by: UROLOGY

## 2024-04-04 PROCEDURE — 7100000001 HC RECOVERY ROOM TIME - INITIAL BASE CHARGE: Performed by: UROLOGY

## 2024-04-04 PROCEDURE — 53010 INCISION OF URETHRA: CPT | Performed by: UROLOGY

## 2024-04-04 PROCEDURE — 3700000001 HC GENERAL ANESTHESIA TIME - INITIAL BASE CHARGE: Performed by: UROLOGY

## 2024-04-04 PROCEDURE — A55180 PR REVISION OF SCROTUM,COMPLICATED: Performed by: ANESTHESIOLOGY

## 2024-04-04 PROCEDURE — 56620 VULVECTOMY SIMPLE PARTIAL: CPT | Performed by: UROLOGY

## 2024-04-04 PROCEDURE — 57110 VAGNC COMPL RMVL VAG WALL: CPT | Performed by: UROLOGY

## 2024-04-04 PROCEDURE — 51702 INSERT TEMP BLADDER CATH: CPT

## 2024-04-04 PROCEDURE — A55180 PR REVISION OF SCROTUM,COMPLICATED: Performed by: NURSE ANESTHETIST, CERTIFIED REGISTERED

## 2024-04-04 PROCEDURE — 0W4N071 CREATION OF PENIS IN FEMALE PERINEUM WITH AUTOLOGOUS TISSUE SUBSTITUTE, OPEN APPROACH: ICD-10-PCS | Performed by: UROLOGY

## 2024-04-04 PROCEDURE — 3600000018 HC OR TIME - INITIAL BASE CHARGE - PROCEDURE LEVEL SIX: Performed by: UROLOGY

## 2024-04-04 PROCEDURE — 2720000007 HC OR 272 NO HCPCS: Performed by: UROLOGY

## 2024-04-04 PROCEDURE — 3600000017 HC OR TIME - EACH INCREMENTAL 1 MINUTE - PROCEDURE LEVEL SIX: Performed by: UROLOGY

## 2024-04-04 PROCEDURE — 2500000004 HC RX 250 GENERAL PHARMACY W/ HCPCS (ALT 636 FOR OP/ED): Performed by: NURSE ANESTHETIST, CERTIFIED REGISTERED

## 2024-04-04 PROCEDURE — 2500000004 HC RX 250 GENERAL PHARMACY W/ HCPCS (ALT 636 FOR OP/ED): Performed by: ANESTHESIOLOGY

## 2024-04-04 PROCEDURE — 14301 TIS TRNFR ANY 30.1-60 SQ CM: CPT | Performed by: SURGERY

## 2024-04-04 PROCEDURE — 2500000004 HC RX 250 GENERAL PHARMACY W/ HCPCS (ALT 636 FOR OP/ED): Mod: JZ | Performed by: NURSE PRACTITIONER

## 2024-04-04 PROCEDURE — 0VQ50ZZ REPAIR SCROTUM, OPEN APPROACH: ICD-10-PCS | Performed by: SURGERY

## 2024-04-04 PROCEDURE — 56620 VULVECTOMY SIMPLE PARTIAL: CPT | Performed by: SURGERY

## 2024-04-04 PROCEDURE — 2500000004 HC RX 250 GENERAL PHARMACY W/ HCPCS (ALT 636 FOR OP/ED): Performed by: UROLOGY

## 2024-04-04 PROCEDURE — 55180 REVISION OF SCROTUM: CPT | Performed by: UROLOGY

## 2024-04-04 PROCEDURE — 15750 NEUROVASCULAR PEDICLE FLAP: CPT | Performed by: UROLOGY

## 2024-04-04 PROCEDURE — 2500000001 HC RX 250 WO HCPCS SELF ADMINISTERED DRUGS (ALT 637 FOR MEDICARE OP): Performed by: NURSE PRACTITIONER

## 2024-04-04 PROCEDURE — 2500000005 HC RX 250 GENERAL PHARMACY W/O HCPCS: Performed by: UROLOGY

## 2024-04-04 PROCEDURE — 2500000004 HC RX 250 GENERAL PHARMACY W/ HCPCS (ALT 636 FOR OP/ED): Performed by: NURSE PRACTITIONER

## 2024-04-04 PROCEDURE — 15750 NEUROVASCULAR PEDICLE FLAP: CPT | Performed by: SURGERY

## 2024-04-04 PROCEDURE — 2500000005 HC RX 250 GENERAL PHARMACY W/O HCPCS: Performed by: NURSE ANESTHETIST, CERTIFIED REGISTERED

## 2024-04-04 PROCEDURE — 88305 TISSUE EXAM BY PATHOLOGIST: CPT | Performed by: STUDENT IN AN ORGANIZED HEALTH CARE EDUCATION/TRAINING PROGRAM

## 2024-04-04 PROCEDURE — 88305 TISSUE EXAM BY PATHOLOGIST: CPT | Mod: TC,PARLAB | Performed by: UROLOGY

## 2024-04-04 PROCEDURE — 0TQD0ZZ REPAIR URETHRA, OPEN APPROACH: ICD-10-PCS | Performed by: SURGERY

## 2024-04-04 DEVICE — GRAFT, CLARIX CORD 1K, 8 X 3CM: Type: IMPLANTABLE DEVICE | Site: GROIN | Status: FUNCTIONAL

## 2024-04-04 DEVICE — IMPLANTABLE DEVICE: Type: IMPLANTABLE DEVICE | Site: GROIN | Status: FUNCTIONAL

## 2024-04-04 RX ORDER — LIDOCAINE HYDROCHLORIDE AND EPINEPHRINE 10; 10 MG/ML; UG/ML
INJECTION, SOLUTION INFILTRATION; PERINEURAL AS NEEDED
Status: DISCONTINUED | OUTPATIENT
Start: 2024-04-04 | End: 2024-04-04 | Stop reason: HOSPADM

## 2024-04-04 RX ORDER — SODIUM CHLORIDE 9 MG/ML
100 INJECTION, SOLUTION INTRAVENOUS CONTINUOUS
Status: DISCONTINUED | OUTPATIENT
Start: 2024-04-04 | End: 2024-04-05 | Stop reason: HOSPADM

## 2024-04-04 RX ORDER — ONDANSETRON 4 MG/1
4 TABLET, FILM COATED ORAL EVERY 6 HOURS PRN
Status: DISCONTINUED | OUTPATIENT
Start: 2024-04-04 | End: 2024-04-05 | Stop reason: HOSPADM

## 2024-04-04 RX ORDER — ONDANSETRON HYDROCHLORIDE 2 MG/ML
INJECTION, SOLUTION INTRAVENOUS AS NEEDED
Status: DISCONTINUED | OUTPATIENT
Start: 2024-04-04 | End: 2024-04-04

## 2024-04-04 RX ORDER — CEFAZOLIN SODIUM 2 G/100ML
2 INJECTION, SOLUTION INTRAVENOUS EVERY 8 HOURS
Status: DISCONTINUED | OUTPATIENT
Start: 2024-04-04 | End: 2024-04-05 | Stop reason: HOSPADM

## 2024-04-04 RX ORDER — CEFAZOLIN SODIUM 2 G/100ML
2 INJECTION, SOLUTION INTRAVENOUS ONCE
Status: COMPLETED | OUTPATIENT
Start: 2024-04-04 | End: 2024-04-04

## 2024-04-04 RX ORDER — MIDAZOLAM HYDROCHLORIDE 1 MG/ML
1 INJECTION, SOLUTION INTRAMUSCULAR; INTRAVENOUS ONCE AS NEEDED
Status: DISCONTINUED | OUTPATIENT
Start: 2024-04-04 | End: 2024-04-04 | Stop reason: HOSPADM

## 2024-04-04 RX ORDER — ALBUTEROL SULFATE 0.83 MG/ML
2.5 SOLUTION RESPIRATORY (INHALATION) ONCE AS NEEDED
Status: DISCONTINUED | OUTPATIENT
Start: 2024-04-04 | End: 2024-04-04 | Stop reason: HOSPADM

## 2024-04-04 RX ORDER — PROPOFOL 10 MG/ML
INJECTION, EMULSION INTRAVENOUS AS NEEDED
Status: DISCONTINUED | OUTPATIENT
Start: 2024-04-04 | End: 2024-04-04

## 2024-04-04 RX ORDER — ENOXAPARIN SODIUM 100 MG/ML
40 INJECTION SUBCUTANEOUS EVERY 24 HOURS
Status: DISCONTINUED | OUTPATIENT
Start: 2024-04-04 | End: 2024-04-05 | Stop reason: HOSPADM

## 2024-04-04 RX ORDER — OXYCODONE HYDROCHLORIDE 5 MG/1
5 TABLET ORAL EVERY 4 HOURS PRN
Status: DISCONTINUED | OUTPATIENT
Start: 2024-04-04 | End: 2024-04-05 | Stop reason: HOSPADM

## 2024-04-04 RX ORDER — OXYBUTYNIN CHLORIDE 5 MG/1
5 TABLET ORAL 3 TIMES DAILY
Status: DISCONTINUED | OUTPATIENT
Start: 2024-04-04 | End: 2024-04-05 | Stop reason: HOSPADM

## 2024-04-04 RX ORDER — METOCLOPRAMIDE HYDROCHLORIDE 5 MG/ML
INJECTION INTRAMUSCULAR; INTRAVENOUS AS NEEDED
Status: DISCONTINUED | OUTPATIENT
Start: 2024-04-04 | End: 2024-04-04

## 2024-04-04 RX ORDER — ROCURONIUM BROMIDE 10 MG/ML
INJECTION, SOLUTION INTRAVENOUS AS NEEDED
Status: DISCONTINUED | OUTPATIENT
Start: 2024-04-04 | End: 2024-04-04

## 2024-04-04 RX ORDER — METRONIDAZOLE 500 MG/100ML
500 INJECTION, SOLUTION INTRAVENOUS ONCE
Status: COMPLETED | OUTPATIENT
Start: 2024-04-04 | End: 2024-04-04

## 2024-04-04 RX ORDER — GABAPENTIN 300 MG/1
600 CAPSULE ORAL ONCE
Status: COMPLETED | OUTPATIENT
Start: 2024-04-04 | End: 2024-04-04

## 2024-04-04 RX ORDER — SODIUM CHLORIDE, SODIUM LACTATE, POTASSIUM CHLORIDE, CALCIUM CHLORIDE 600; 310; 30; 20 MG/100ML; MG/100ML; MG/100ML; MG/100ML
100 INJECTION, SOLUTION INTRAVENOUS CONTINUOUS
Status: DISCONTINUED | OUTPATIENT
Start: 2024-04-04 | End: 2024-04-04 | Stop reason: HOSPADM

## 2024-04-04 RX ORDER — ACETAMINOPHEN 325 MG/1
975 TABLET ORAL ONCE
Status: COMPLETED | OUTPATIENT
Start: 2024-04-04 | End: 2024-04-04

## 2024-04-04 RX ORDER — CELECOXIB 100 MG/1
400 CAPSULE ORAL ONCE
Status: COMPLETED | OUTPATIENT
Start: 2024-04-04 | End: 2024-04-04

## 2024-04-04 RX ORDER — MEPERIDINE HYDROCHLORIDE 25 MG/ML
12.5 INJECTION INTRAMUSCULAR; INTRAVENOUS; SUBCUTANEOUS EVERY 10 MIN PRN
Status: DISCONTINUED | OUTPATIENT
Start: 2024-04-04 | End: 2024-04-04 | Stop reason: HOSPADM

## 2024-04-04 RX ORDER — FENTANYL CITRATE 50 UG/ML
INJECTION, SOLUTION INTRAMUSCULAR; INTRAVENOUS AS NEEDED
Status: DISCONTINUED | OUTPATIENT
Start: 2024-04-04 | End: 2024-04-04

## 2024-04-04 RX ORDER — ONDANSETRON HYDROCHLORIDE 2 MG/ML
4 INJECTION, SOLUTION INTRAVENOUS ONCE AS NEEDED
Status: DISCONTINUED | OUTPATIENT
Start: 2024-04-04 | End: 2024-04-04 | Stop reason: HOSPADM

## 2024-04-04 RX ORDER — MEPERIDINE HYDROCHLORIDE 25 MG/ML
INJECTION INTRAMUSCULAR; INTRAVENOUS; SUBCUTANEOUS AS NEEDED
Status: DISCONTINUED | OUTPATIENT
Start: 2024-04-04 | End: 2024-04-04

## 2024-04-04 RX ORDER — OXYCODONE HYDROCHLORIDE 5 MG/1
10 TABLET ORAL EVERY 4 HOURS PRN
Status: DISCONTINUED | OUTPATIENT
Start: 2024-04-04 | End: 2024-04-05 | Stop reason: HOSPADM

## 2024-04-04 RX ORDER — POLYETHYLENE GLYCOL 3350 17 G/17G
17 POWDER, FOR SOLUTION ORAL DAILY
Status: DISCONTINUED | OUTPATIENT
Start: 2024-04-04 | End: 2024-04-05 | Stop reason: HOSPADM

## 2024-04-04 RX ORDER — TESTOSTERONE CYPIONATE 200 MG/ML
60 INJECTION, SOLUTION INTRAMUSCULAR
Status: DISCONTINUED | OUTPATIENT
Start: 2024-04-11 | End: 2024-04-04

## 2024-04-04 RX ORDER — TESTOSTERONE CYPIONATE 200 MG/ML
60 INJECTION, SOLUTION INTRAMUSCULAR
Status: DISCONTINUED | OUTPATIENT
Start: 2024-04-10 | End: 2024-04-05 | Stop reason: HOSPADM

## 2024-04-04 RX ORDER — KETOROLAC TROMETHAMINE 30 MG/ML
15 INJECTION, SOLUTION INTRAMUSCULAR; INTRAVENOUS EVERY 6 HOURS
Status: DISCONTINUED | OUTPATIENT
Start: 2024-04-04 | End: 2024-04-05 | Stop reason: HOSPADM

## 2024-04-04 RX ORDER — ACETAMINOPHEN 325 MG/1
975 TABLET ORAL EVERY 6 HOURS
Status: DISCONTINUED | OUTPATIENT
Start: 2024-04-04 | End: 2024-04-05 | Stop reason: HOSPADM

## 2024-04-04 RX ORDER — HYDRALAZINE HYDROCHLORIDE 20 MG/ML
5 INJECTION INTRAMUSCULAR; INTRAVENOUS EVERY 30 MIN PRN
Status: DISCONTINUED | OUTPATIENT
Start: 2024-04-04 | End: 2024-04-04 | Stop reason: HOSPADM

## 2024-04-04 RX ORDER — METRONIDAZOLE 500 MG/100ML
INJECTION, SOLUTION INTRAVENOUS AS NEEDED
Status: DISCONTINUED | OUTPATIENT
Start: 2024-04-04 | End: 2024-04-04

## 2024-04-04 RX ORDER — LIDOCAINE HCL/PF 100 MG/5ML
SYRINGE (ML) INTRAVENOUS AS NEEDED
Status: DISCONTINUED | OUTPATIENT
Start: 2024-04-04 | End: 2024-04-04

## 2024-04-04 RX ORDER — LABETALOL HYDROCHLORIDE 5 MG/ML
5 INJECTION, SOLUTION INTRAVENOUS ONCE AS NEEDED
Status: DISCONTINUED | OUTPATIENT
Start: 2024-04-04 | End: 2024-04-04 | Stop reason: HOSPADM

## 2024-04-04 RX ORDER — LIDOCAINE HYDROCHLORIDE 10 MG/ML
0.1 INJECTION INFILTRATION; PERINEURAL ONCE
Status: DISCONTINUED | OUTPATIENT
Start: 2024-04-04 | End: 2024-04-04 | Stop reason: HOSPADM

## 2024-04-04 RX ORDER — HEPARIN SODIUM 5000 [USP'U]/ML
5000 INJECTION, SOLUTION INTRAVENOUS; SUBCUTANEOUS ONCE
Status: COMPLETED | OUTPATIENT
Start: 2024-04-04 | End: 2024-04-04

## 2024-04-04 RX ORDER — TESTOSTERONE CYPIONATE 200 MG/ML
60 INJECTION, SOLUTION INTRAMUSCULAR
Status: DISCONTINUED | OUTPATIENT
Start: 2024-04-05 | End: 2024-04-04

## 2024-04-04 RX ORDER — MIDAZOLAM HYDROCHLORIDE 1 MG/ML
INJECTION, SOLUTION INTRAMUSCULAR; INTRAVENOUS AS NEEDED
Status: DISCONTINUED | OUTPATIENT
Start: 2024-04-04 | End: 2024-04-04

## 2024-04-04 RX ORDER — ONDANSETRON HYDROCHLORIDE 2 MG/ML
4 INJECTION, SOLUTION INTRAVENOUS EVERY 6 HOURS PRN
Status: DISCONTINUED | OUTPATIENT
Start: 2024-04-04 | End: 2024-04-05 | Stop reason: HOSPADM

## 2024-04-04 RX ORDER — ACETAMINOPHEN 325 MG/1
650 TABLET ORAL EVERY 4 HOURS PRN
Status: DISCONTINUED | OUTPATIENT
Start: 2024-04-04 | End: 2024-04-04 | Stop reason: HOSPADM

## 2024-04-04 RX ADMIN — ACETAMINOPHEN 975 MG: 325 TABLET ORAL at 23:39

## 2024-04-04 RX ADMIN — KETOROLAC TROMETHAMINE 15 MG: 30 INJECTION, SOLUTION INTRAMUSCULAR at 17:54

## 2024-04-04 RX ADMIN — GENTAMICIN SULFATE 250 MG: 40 INJECTION, SOLUTION INTRAMUSCULAR; INTRAVENOUS at 10:50

## 2024-04-04 RX ADMIN — ACETAMINOPHEN 975 MG: 325 TABLET ORAL at 17:55

## 2024-04-04 RX ADMIN — MIDAZOLAM 2 MG: 1 INJECTION INTRAMUSCULAR; INTRAVENOUS at 10:34

## 2024-04-04 RX ADMIN — ACETAMINOPHEN 975 MG: 325 TABLET ORAL at 10:07

## 2024-04-04 RX ADMIN — METOCLOPRAMIDE HYDROCHLORIDE 10 MG: 5 INJECTION INTRAMUSCULAR; INTRAVENOUS at 11:05

## 2024-04-04 RX ADMIN — GABAPENTIN 600 MG: 300 CAPSULE ORAL at 10:07

## 2024-04-04 RX ADMIN — FENTANYL CITRATE 50 MCG: 50 INJECTION, SOLUTION INTRAMUSCULAR; INTRAVENOUS at 11:50

## 2024-04-04 RX ADMIN — HYDROMORPHONE HYDROCHLORIDE 0.6 MG: 2 INJECTION, SOLUTION INTRAMUSCULAR; INTRAVENOUS; SUBCUTANEOUS at 12:41

## 2024-04-04 RX ADMIN — POLYETHYLENE GLYCOL 3350 17 G: 17 POWDER, FOR SOLUTION ORAL at 17:54

## 2024-04-04 RX ADMIN — ONDANSETRON 4 MG: 2 INJECTION INTRAMUSCULAR; INTRAVENOUS at 15:29

## 2024-04-04 RX ADMIN — FENTANYL CITRATE 50 MCG: 50 INJECTION, SOLUTION INTRAMUSCULAR; INTRAVENOUS at 11:13

## 2024-04-04 RX ADMIN — OXYBUTYNIN CHLORIDE 5 MG: 5 TABLET ORAL at 17:55

## 2024-04-04 RX ADMIN — CEFAZOLIN SODIUM 2 G: 2 INJECTION, SOLUTION INTRAVENOUS at 23:38

## 2024-04-04 RX ADMIN — ROCURONIUM BROMIDE 50 MG: 10 INJECTION, SOLUTION INTRAVENOUS at 10:46

## 2024-04-04 RX ADMIN — CEFAZOLIN SODIUM 2 G: 2 INJECTION, SOLUTION INTRAVENOUS at 14:50

## 2024-04-04 RX ADMIN — OXYBUTYNIN CHLORIDE 5 MG: 5 TABLET ORAL at 21:04

## 2024-04-04 RX ADMIN — HYDROMORPHONE HYDROCHLORIDE 0.5 MG: 1 INJECTION, SOLUTION INTRAMUSCULAR; INTRAVENOUS; SUBCUTANEOUS at 16:26

## 2024-04-04 RX ADMIN — HYDROMORPHONE HYDROCHLORIDE 0.4 MG: 2 INJECTION, SOLUTION INTRAMUSCULAR; INTRAVENOUS; SUBCUTANEOUS at 12:52

## 2024-04-04 RX ADMIN — SODIUM CHLORIDE, POTASSIUM CHLORIDE, SODIUM LACTATE AND CALCIUM CHLORIDE: 600; 310; 30; 20 INJECTION, SOLUTION INTRAVENOUS at 11:51

## 2024-04-04 RX ADMIN — METRONIDAZOLE 500 MG: 500 INJECTION, SOLUTION INTRAVENOUS at 10:08

## 2024-04-04 RX ADMIN — POVIDONE-IODINE 1 APPLICATION: 5 SOLUTION TOPICAL at 10:16

## 2024-04-04 RX ADMIN — KETOROLAC TROMETHAMINE 15 MG: 30 INJECTION, SOLUTION INTRAMUSCULAR at 23:38

## 2024-04-04 RX ADMIN — LIDOCAINE HYDROCHLORIDE 60 MG: 20 INJECTION INTRAVENOUS at 10:46

## 2024-04-04 RX ADMIN — MEPERIDINE HYDROCHLORIDE 25 MG: 25 INJECTION INTRAMUSCULAR; INTRAVENOUS; SUBCUTANEOUS at 15:29

## 2024-04-04 RX ADMIN — FENTANYL CITRATE 100 MCG: 50 INJECTION, SOLUTION INTRAMUSCULAR; INTRAVENOUS at 10:46

## 2024-04-04 RX ADMIN — CELECOXIB 400 MG: 100 CAPSULE ORAL at 10:07

## 2024-04-04 RX ADMIN — HYDROMORPHONE HYDROCHLORIDE 1 MG: 2 INJECTION, SOLUTION INTRAMUSCULAR; INTRAVENOUS; SUBCUTANEOUS at 12:57

## 2024-04-04 RX ADMIN — ROCURONIUM BROMIDE 20 MG: 10 INJECTION, SOLUTION INTRAVENOUS at 11:50

## 2024-04-04 RX ADMIN — DEXAMETHASONE SODIUM PHOSPHATE 8 MG: 4 INJECTION, SOLUTION INTRAMUSCULAR; INTRAVENOUS at 11:05

## 2024-04-04 RX ADMIN — METRONIDAZOLE 500 MG: 500 INJECTION, SOLUTION INTRAVENOUS at 10:32

## 2024-04-04 RX ADMIN — SODIUM CHLORIDE: 9 INJECTION, SOLUTION INTRAVENOUS at 10:32

## 2024-04-04 RX ADMIN — HEPARIN SODIUM 5000 UNITS: 5000 INJECTION INTRAVENOUS; SUBCUTANEOUS at 10:07

## 2024-04-04 RX ADMIN — SODIUM CHLORIDE 100 ML/HR: 9 INJECTION, SOLUTION INTRAVENOUS at 16:15

## 2024-04-04 RX ADMIN — ENOXAPARIN SODIUM 40 MG: 40 INJECTION SUBCUTANEOUS at 21:04

## 2024-04-04 RX ADMIN — FENTANYL CITRATE 50 MCG: 50 INJECTION, SOLUTION INTRAMUSCULAR; INTRAVENOUS at 11:07

## 2024-04-04 RX ADMIN — OXYCODONE HYDROCHLORIDE 10 MG: 5 TABLET ORAL at 21:07

## 2024-04-04 RX ADMIN — SODIUM CHLORIDE 100 ML/HR: 9 INJECTION, SOLUTION INTRAVENOUS at 10:08

## 2024-04-04 RX ADMIN — PROPOFOL 200 MG: 10 INJECTION, EMULSION INTRAVENOUS at 10:46

## 2024-04-04 RX ADMIN — SODIUM CHLORIDE, POTASSIUM CHLORIDE, SODIUM LACTATE AND CALCIUM CHLORIDE: 600; 310; 30; 20 INJECTION, SOLUTION INTRAVENOUS at 10:50

## 2024-04-04 RX ADMIN — CEFAZOLIN SODIUM 2 G: 2 INJECTION, SOLUTION INTRAVENOUS at 10:50

## 2024-04-04 SDOH — SOCIAL STABILITY: SOCIAL INSECURITY: ABUSE: ADULT

## 2024-04-04 SDOH — SOCIAL STABILITY: SOCIAL INSECURITY: ARE THERE ANY APPARENT SIGNS OF INJURIES/BEHAVIORS THAT COULD BE RELATED TO ABUSE/NEGLECT?: NO

## 2024-04-04 SDOH — SOCIAL STABILITY: SOCIAL INSECURITY: HAS ANYONE EVER THREATENED TO HURT YOUR FAMILY OR YOUR PETS?: NO

## 2024-04-04 SDOH — SOCIAL STABILITY: SOCIAL INSECURITY: HAVE YOU HAD THOUGHTS OF HARMING ANYONE ELSE?: NO

## 2024-04-04 SDOH — SOCIAL STABILITY: SOCIAL INSECURITY: DOES ANYONE TRY TO KEEP YOU FROM HAVING/CONTACTING OTHER FRIENDS OR DOING THINGS OUTSIDE YOUR HOME?: NO

## 2024-04-04 SDOH — SOCIAL STABILITY: SOCIAL INSECURITY: DO YOU FEEL ANYONE HAS EXPLOITED OR TAKEN ADVANTAGE OF YOU FINANCIALLY OR OF YOUR PERSONAL PROPERTY?: NO

## 2024-04-04 SDOH — SOCIAL STABILITY: SOCIAL INSECURITY: DO YOU FEEL UNSAFE GOING BACK TO THE PLACE WHERE YOU ARE LIVING?: NO

## 2024-04-04 SDOH — SOCIAL STABILITY: SOCIAL INSECURITY: WERE YOU ABLE TO COMPLETE ALL THE BEHAVIORAL HEALTH SCREENINGS?: YES

## 2024-04-04 SDOH — HEALTH STABILITY: MENTAL HEALTH: CURRENT SMOKER: 0

## 2024-04-04 SDOH — SOCIAL STABILITY: SOCIAL INSECURITY: ARE YOU OR HAVE YOU BEEN THREATENED OR ABUSED PHYSICALLY, EMOTIONALLY, OR SEXUALLY BY ANYONE?: NO

## 2024-04-04 ASSESSMENT — COGNITIVE AND FUNCTIONAL STATUS - GENERAL
MOBILITY SCORE: 24
MOBILITY SCORE: 24
PATIENT BASELINE BEDBOUND: NO
DAILY ACTIVITIY SCORE: 24

## 2024-04-04 ASSESSMENT — COLUMBIA-SUICIDE SEVERITY RATING SCALE - C-SSRS
1. IN THE PAST MONTH, HAVE YOU WISHED YOU WERE DEAD OR WISHED YOU COULD GO TO SLEEP AND NOT WAKE UP?: NO
2. HAVE YOU ACTUALLY HAD ANY THOUGHTS OF KILLING YOURSELF?: NO
6. HAVE YOU EVER DONE ANYTHING, STARTED TO DO ANYTHING, OR PREPARED TO DO ANYTHING TO END YOUR LIFE?: NO

## 2024-04-04 ASSESSMENT — PATIENT HEALTH QUESTIONNAIRE - PHQ9
SUM OF ALL RESPONSES TO PHQ9 QUESTIONS 1 & 2: 0
2. FEELING DOWN, DEPRESSED OR HOPELESS: NOT AT ALL
1. LITTLE INTEREST OR PLEASURE IN DOING THINGS: NOT AT ALL

## 2024-04-04 ASSESSMENT — PAIN - FUNCTIONAL ASSESSMENT
PAIN_FUNCTIONAL_ASSESSMENT: 0-10

## 2024-04-04 ASSESSMENT — ACTIVITIES OF DAILY LIVING (ADL)
PATIENT'S MEMORY ADEQUATE TO SAFELY COMPLETE DAILY ACTIVITIES?: YES
HEARING - RIGHT EAR: FUNCTIONAL
WALKS IN HOME: INDEPENDENT
LACK_OF_TRANSPORTATION: NO
TOILETING: INDEPENDENT
HEARING - LEFT EAR: FUNCTIONAL
BATHING: INDEPENDENT
JUDGMENT_ADEQUATE_SAFELY_COMPLETE_DAILY_ACTIVITIES: YES
FEEDING YOURSELF: INDEPENDENT
ADEQUATE_TO_COMPLETE_ADL: YES
GROOMING: INDEPENDENT
DRESSING YOURSELF: INDEPENDENT

## 2024-04-04 ASSESSMENT — LIFESTYLE VARIABLES
SKIP TO QUESTIONS 9-10: 1
HOW OFTEN DO YOU HAVE A DRINK CONTAINING ALCOHOL: NEVER
AUDIT-C TOTAL SCORE: 0
HOW OFTEN DO YOU HAVE 6 OR MORE DRINKS ON ONE OCCASION: NEVER
PRESCIPTION_ABUSE_PAST_12_MONTHS: NO
AUDIT-C TOTAL SCORE: 0
HOW MANY STANDARD DRINKS CONTAINING ALCOHOL DO YOU HAVE ON A TYPICAL DAY: PATIENT DOES NOT DRINK
SUBSTANCE_ABUSE_PAST_12_MONTHS: YES

## 2024-04-04 ASSESSMENT — PAIN SCALES - GENERAL
PAINLEVEL_OUTOF10: 7
PAINLEVEL_OUTOF10: 8
PAINLEVEL_OUTOF10: 3
PAINLEVEL_OUTOF10: 0 - NO PAIN
PAINLEVEL_OUTOF10: 4
PAINLEVEL_OUTOF10: 0 - NO PAIN
PAINLEVEL_OUTOF10: 7
PAINLEVEL_OUTOF10: 3
PAINLEVEL_OUTOF10: 0 - NO PAIN
PAINLEVEL_OUTOF10: 4

## 2024-04-04 NOTE — ANESTHESIA POSTPROCEDURE EVALUATION
Patient: Damian Galdamez    Procedure Summary       Date: 04/04/24 Room / Location: [facility] 06 / [facility]    Anesthesia Start: 1032 Anesthesia Stop:     Procedures:       STAGE II PHALLOPLASTY (Groin)      URETHROPLASTY (Groin)      SCROTOPLASTY (Groin)      COLPECTOMY (Groin) Diagnosis:       Gender dysphoria      (Gender dysphoria [F64.9])    Surgeons: Bladimir Castillo MD Responsible Provider: Marlon Baum MD    Anesthesia Type: general ASA Status: 2            Anesthesia Type: general    Vitals Value Taken Time   /64 04/04/24 1546   Temp 36.7 04/04/24 1546   Pulse 82 04/04/24 1546   Resp 16 04/04/24 1546   SpO2 100% 04/04/24 1546       Anesthesia Post Evaluation    Patient location during evaluation: PACU  Patient participation: complete - patient participated  Level of consciousness: sleepy but conscious  Pain management: adequate  Airway patency: patent  Cardiovascular status: acceptable  Respiratory status: acceptable  Hydration status: acceptable  Postoperative Nausea and Vomiting: none        There were no known notable events for this encounter.

## 2024-04-04 NOTE — ANESTHESIA PROCEDURE NOTES
Airway  Date/Time: 4/4/2024 10:49 AM  Urgency: elective    Airway not difficult    Staffing  Performed: CRNA   Authorized by: Marlon Baum MD    Performed by: ADRIANO Paige-STEVE  Patient location during procedure: OR    Indications and Patient Condition  Indications for airway management: anesthesia and airway protection  Spontaneous ventilation: present  Sedation level: deep  Preoxygenated: yes  Patient position: sniffing  MILS maintained throughout  Mask difficulty assessment: 1 - vent by mask  Planned trial extubation    Final Airway Details  Final airway type: endotracheal airway      Successful airway: ETT  Cuffed: yes   Successful intubation technique: direct laryngoscopy  Facilitating devices/methods: intubating stylet and anterior pressure/BURP  Endotracheal tube insertion site: oral  Blade: Sondra  Blade size: #4  ETT size (mm): 7.0  Cormack-Lehane Classification: grade I - full view of glottis  Placement verified by: capnometry   Measured from: lips  ETT to lips (cm): 22  Number of attempts at approach: 1  Ventilation between attempts: none  Number of other approaches attempted: 0

## 2024-04-04 NOTE — OP NOTE
STAGE II PHALLOPLASTY, URETHROPLASTY, SCROTOPLASTY, COLPECTOMY Operative Note     Date: 2024  OR Location: [facility]    Name: Damian Galdamez, : 2000, Age: 23 y.o., MRN: [id number], Sex: adult    Diagnosis  Pre-op Diagnosis     * Gender dysphoria [F64.9] Post-op Diagnosis     * Gender dysphoria [F64.9]     Procedures  STAGE II PHALLOPLASTY  83737 - [place] UNLISTED PROCEDURE MALE GENITAL SYSTEM    URETHROPLASTY  70114 - [place] URETHROPLASTY 1 STG RECNST MALE ANTERIOR URETHRA    SCROTOPLASTY  47891 - [place] SCROTOPLASTY COMPLICATED    COLPECTOMY  41749 - [place] VAGINECTOMY COMPLETE REMOVAL VAGINAL WALL    Perineal urethrostomy  Clitoral burial  Labia ectomy  Perineal masculinization with adjacent tissue transfer 10 cm x 7 cm equals to 70 cm²  Ventral chordee release of the moustapha phallus.  Scar release with multiple Z-plasty performed.  Total area was 12 cm x 5 cm in the near phallus.  This is equal to 60 cm².  Additionally, at the base adjacent tissue transfer with back cuts were done.  This was 10 cm x 6 cm equals to 60 cm  Surgeons      * Bladimir Castillo - Primary  Please note that Dr. Escamilla would be assisting due to the lack of a qualified assistant  Resident/Fellow/Other Assistant:  Surgeon(s) and Role:     * Alexander Escamilla MD - Assisting     * Juan Carlos Martinez MD - Resident - Assisting    Procedure Summary  Anesthesia: General  ASA: II  Anesthesia Staff: Anesthesiologist: Marlon Baum MD  CRNA: ADRIANO Paige-CRNA  Estimated Blood Loss: 150mL  Intra-op Medications:   Administrations occurring from 1115 to 1545 on 24:   Medication Name Total Dose   lidocaine-epinephrine (Xylocaine W/EPI) 1 %-1:100,000 injection 17 mL   ceFAZolin in dextrose (iso-os) (Ancef) IVPB 2 g 2 g              Anesthesia Record               Intraprocedure I/O Totals          Intake    LR bolus 1700.00 mL    NaCl 0.9 % bolus 600.00 mL    ceFAZolin in dextrose (iso-os) (Ancef) IVPB 2 g 200.00 mL    gentamicin (Garamycin) 250 mg  in dextrose 5 % in water (D5W) 100 mL .00 mL    metroNIDAZOLE (Flagyl)  mg/100 mL (premix) 100.00 mL    Total Intake 2700 mL       Output    Est. Blood Loss 100 mL    Total Output 100 mL       Net    Net Volume 2600 mL          Specimen:   ID Type Source Tests Collected by Time   1 : VAGINAL WALL Tissue VAGINA EXCISION SURGICAL PATHOLOGY EXAM Bladimir Castillo MD 4/4/2024 1217        Staff:   Circulator: Ofe Soria RN  Relief Circulator: Elvi Carson RN; Annemarie Moreau RN; Scott Fernandes RN  Relief Scrub: Phan Lezama  Scrub Person: Dipti Buck         Drains and/or Catheters:   Closed/Suction Drain 1 Right Groin Bulb 19 Fr. (Active)   Dressing Status Clean;Dry 04/04/24 1552   Status To bulb suction 04/04/24 1552       Urethral Catheter 16 Fr. (Active)   Site Assessment Clean;Skin intact 04/04/24 1552   Securement Method Securing device (Describe) 04/04/24 1552   $ Urethral Catheter Charge Indwelling cath 04/04/24 1552       Tourniquet Times:         Implants:  Implants       Type Name Action Serial No.      Graft GRAFT, CLARIX CORD 1K, 8 X 3CM - [id number] Implanted      Tissue CTM Paste X-Large Implanted N/A              Findings: Severe ventral chordee of the penis.  This was released with excision of the scar.  Adequate stage II phalloplasty.  No glans formation performed.    Indications: Damian Galdamez is an 23 y.o. adult who is having surgery for Gender dysphoria [F64.9].  He has prior shaft only phalloplasty done using an ALT flap.  The phalloplasty itself was complicated by having to delay the for stage, then at the second stage having to leave the phallus partly open.  He eventually healed with a severe ventral stellate scar causing chordee.  He is interested in release of this chordee plus stage II phalloplasty without urethra.  We advised him that due to the extensive amount of work we would do the near phallus to straighten it out, we would not be able to do a glans plasty  today.    The patient was seen in the preoperative area. The risks, benefits, complications, treatment options, non-operative alternatives, expected recovery and outcomes were discussed with the patient. The possibilities of reaction to medication, pulmonary aspiration, injury to surrounding structures, bleeding, recurrent infection, the need for additional procedures, failure to diagnose a condition, and creating a complication requiring transfusion or operation were discussed with the patient. The patient concurred with the proposed plan, giving informed consent.  The site of surgery was properly noted/marked if necessary per policy. The patient has been actively warmed in preoperative area. Preoperative antibiotics have been ordered and given within 1 hours of incision. Venous thrombosis prophylaxis have been ordered including bilateral sequential compression devices and chemical prophylaxis    Procedure Details: Patient was brought to the operating suite and laid supine on the operating table.  Huddle was performed.  He was anesthetized and orotracheally intubated.  He was placed in dorsolithotomy position and prepped and draped in standard for sterile fashion.  We focused our attention on the phallus.  There was noted to be severe ventral chordee.  The length along the dorsum of the penis was 14 cm.  The length along the ventral aspect of the penis was 8 cm.  There was noted to be a stellate scar at the base of the phallus extending into the midportion of the phallus.  First of all, we released the phallus from the underlying labial tissue that it was adhered to.  This allowed the release of some of the contracture.  The midshaft of the phallus was then number top, and multiple inline Z-plasty incisions were made to allow for full release of the phallus.  We proceeded with debulking the phallus.  Subcutaneous adipose tissue was removed with electrocautery in a very judicious and methodical fashion taking sure  not to devascularize any of our Z-plasty flaps and also not to injure the vascular pedicle.  After this was done, Zoe Dinesh tubularization of the phallus with alternation of the Z-plasty flaps was done.  It appeared to have a aesthetic appearance.  The dimensions of the tissue rearrangement on the phallus was 12 cm x 5 cm equals 60 cm².  We had made back cuts at the base of the phallus extending out laterally on each side.  This allowed for adjacent tissue transfer inferiorly to allow for more appropriate closure as well.  The dimensions here were 10 cm x 6 cm equals 60 cm².  Edges were Sharpoint, and closure of the phallus as well as a transverse incision along the base was done with dermal interrupted PDS sutures and 3-0 Monocryl on the skin.  Prior to closure, because of the fact that patient had prior scarring and contracture, CTM matrix was injected in the areas of the penile closure.  We then focused our attention on the clitoral burial.  Circumferential incision was made below the glans clitoris, and the clitoris was degloved.  Ventral attachments of the clitoris to the underlying urethral plate were also released.  The glans clitoris was de-epithelialized.  We proceeded to remove the clitoral skin as well as labia minora on both sides using electrocautery.  Once this was done, the clitoris was buried above the scrotum but below the phallus after the right side and undyed absorbable suture was used to zoe it on the skin.  We then focused our attention on the vaginectomy.  We injected the lower part of the vas vestibule with lidocaine and and epinephrine.  This was also injected and under beneath the vaginal mucosa.  Electrocautery was used to remove all the endodermal labia minora as well as vestibular tissue.  The vaginal canal itself was excised circumferentially for about half of the vaginal length.  Hemostasis was excellent.  For the most proximal part of the vaginal canal, we used ball tip  electrocautery at a 66 setting to vaporize the mucosa.  Multiple passes were made.  Once this was done, cerclage sutures were placed throughout the vaginal canal to achieve closure.  At the level of the  diaphragm, couple of interrupted figure-of-eight PDS sutures were placed.  Once the vaginal canal was closed, we proceeded to outlying the labial flaps for scrotoplasty.  U-shaped labial flaps were raised bilaterally up to the superior extent of the inferior pubic ramus on each side.  These flaps were then rotated 280 degrees to me the reconstructed tissue near the base of the phallus.  Staples were used to temporarily hold these flaps in place here.  At this point in time, we proceeded with a perineal urethrostomy in the perineal masculinization.  The perineum was closed in an elongated fashion using a combination of dermal 2-0 PDS's and Monocryl on the skin.  Total area of tissue mobilization to allow for perineal closure was 10 cm x 7 cm equals to 70 cm².  Once the perineal closure was done up to the level of the urethra, we then fashion the urethral edge.  The inferior part of the urethra where there was some vaginal mucosa was freshened to remove all vaginal mucosa.  Superiorly, the urethral plate was preserved to allow for a tension-free aesthetic closure.  We then performed perineal urethrostomy by advancing the urethral mucosa up to the level of the perineal masculinization skin flaps.  To reduce tension, back cuts were made on the skin flaps laterally.  Interrupted Vicryl sutures were used to mature the urethra to the perineum.  We cut to size multiple pieces of Clarinex-D 1 K umbilical cord and placed at underneath the scrotum area, as well as into the perineum.  This is to help prevent wound breakage, and should it happen, to allow to heal it faster.  We then proceeded with scrotoplasty.  The scrotum flaps had been moved to 180 degrees to near the base of the phallus.  Dermal closure was done with  interrupted and running PDS and Vicryl.  Skin was closed with interrupted and running Monocryl.  Note that prior to closure of the scrotum, a Penrose drain was placed in the dependent portion exiting out of the midline incision.  The inferior aspect of the scrotum was matured to the superior aspect of the urethral plate to complete the perineal urethrostomy.  Drain stitches were placed.  And wound was copiously cleaned.  Phallus remained well-perfused and healthy at the end.  This concluded the procedure    Complications:  None; patient tolerated the procedure well.    Disposition: PACU - hemodynamically stable.  Condition: stable         Additional Details: He will go to the floor for ongoing care.  He will be discharged tomorrow.  He will be discharged with his drains and catheter.  Will see him next Monday and make a determination about any drain removal    Attending Attestation: I was present and scrubbed for the entire procedure.    Bladimir Castillo  Phone Number: [phone]

## 2024-04-04 NOTE — ANESTHESIA PREPROCEDURE EVALUATION
Patient: Damian Galdamez    Procedure Information       Date/Time: 04/04/24 1115    Procedures:       STAGE II PHALLOPLASTY (Groin) - Stage II Phalloplasty      URETHROPLASTY (Groin)      SCROTOPLASTY (Groin)      COLPECTOMY (Groin)    Location: [facility] / [facility]    Surgeons: Bladimir Castillo MD            Relevant Problems   Anesthesia (within normal limits)      ID   (+) Wound infection       Clinical information reviewed:    Allergies  Meds               NPO Detail:  NPO/Void Status  Carbohydrate Drink Given Prior to Surgery? : N  Date of Last Liquid: 04/04/24  Time of Last Liquid: 0700  Date of Last Solid: 04/03/24  Time of Last Solid: 1700  Last Intake Type: Light meal  Time of Last Void: 0954         Physical Exam    Airway  Mallampati: II  TM distance: >3 FB  Neck ROM: full     Cardiovascular   Rhythm: regular     Dental    Pulmonary    Abdominal        Anesthesia Plan    History of general anesthesia?: yes  History of complications of general anesthesia?: no    ASA 2     general     The patient is not a current smoker.  Patient was not previously instructed to abstain from smoking on day of procedure.  Patient did not smoke on day of procedure.    intravenous induction   Anesthetic plan and risks discussed with patient.  Use of blood products discussed with patient who.    Plan discussed with CRNA.

## 2024-04-04 NOTE — H&P
History Of Present Illness  Damian Galdamez is a 23 y.o. adult transmale s/p staged elevation of left anterolateral thigh flap pedicle 8/9, ALT phalloplasty w/left thigh flap with Dr. Hansen (9/13) followed by Excision of scar tissue with partial closure of the flap 11/1/2023 c/b >50% wound dehiscence s/p washout 11/7/2023 who is here today for stage 2 phalloplasty, vaginectomy, clitoral burial, and scrotoplasty, scar revision and an orthoplasty of phallus.     Past Medical History  No past medical history on file.    Surgical History  No past surgical history on file.     Social History  He reports that he has never smoked. He has never used smokeless tobacco. He reports current alcohol use. He reports current drug use. Drug: Marijuana.    Family History  No family history on file.     Allergies  Patient has no known allergies.    Review of Systems   Reviewed    Physical Exam   General: in no acute distress, non-toxic appearing   Respiratory: non labored breathing   Cardiovascular: regular rate per chart   Extremities: no cyanosis, clubbing or edema   Skin: no obvious lesions or rashes   Psych: appropriate mood and behavior    Last Recorded Vitals  Weight 84.5 kg (186 lb 4.6 oz).    Relevant Results   3/14: negative     Assessment/Plan   Principal Problem:    Gender dysphoria    Damian Galdamez is a 23 y.o. adult transmale s/p staged elevation of left anterolateral thigh flap pedicle 8/9, AALT phalloplasty w/left thigh flap with Dr. Hansen (9/13) followed by Excision of scar tissue with partial closure of the flap 11/1/2023 c/b >50% wound dehiscence s/p washout 11/7/2023 who is here today for stage 2 phalloplasty, vaginectomy, clitoral burial, and scrotoplasty, scar revision and an orthoplasty of phallus.           Juan Carlos Martinez MD

## 2024-04-04 NOTE — OP NOTE
STAGE II PHALLOPLASTY, URETHROPLASTY, SCROTOPLASTY, COLPECTOMY Operative Note     Date: 2024  OR Location: [facility]    Name: Damian Galdamez, : 2000, Age: 23 y.o., MRN: [id number], Sex: adult    Diagnosis  Pre-op Diagnosis     * Gender dysphoria [F64.9] Post-op Diagnosis     * Gender dysphoria [F64.9]     Procedures  STAGE II PHALLOPLASTY  17236 - TN UNLISTED PROCEDURE MALE GENITAL SYSTEM    URETHROPLASTY  04658 - TN URETHROPLASTY 1 STG RECNST MALE ANTERIOR URETHRA    SCROTOPLASTY  85061 - TN SCROTOPLASTY COMPLICATED    COLPECTOMY  35599 - TN VAGINECTOMY COMPLETE REMOVAL VAGINAL WALL    Adjacent Tissue Transfer of penile dorsum with multiple in line Z-plasties, 12 x 5 cm and base 10 x 6 cm.      Surgeons      * Bladimir Castillo - Primary  MD Dr Jonathan Randall and I served as Co-Surgeons for this case.    Resident/Fellow/Other Assistant:      Procedure Summary  Anesthesia: General  ASA: II  Anesthesia Staff: Anesthesiologist: Marlon Baum MD  CRNA: ADRIANO Paige-CRNA  Estimated Blood Loss: 20mL  Intra-op Medications:   Administrations occurring from 1115 to 1545 on 24:   Medication Name Total Dose   lidocaine-epinephrine (Xylocaine W/EPI) 1 %-1:100,000 injection 17 mL   ceFAZolin in dextrose (iso-os) (Ancef) IVPB 2 g 2 g              Anesthesia Record               Intraprocedure I/O Totals          Intake    LR bolus 1700.00 mL    NaCl 0.9 % bolus 600.00 mL    ceFAZolin in dextrose (iso-os) (Ancef) IVPB 2 g 200.00 mL    gentamicin (Garamycin) 250 mg in dextrose 5 % in water (D5W) 100 mL .00 mL    metroNIDAZOLE (Flagyl)  mg/100 mL (premix) 100.00 mL    Total Intake 2700 mL       Output    Est. Blood Loss 100 mL    Total Output 100 mL       Net    Net Volume 2600 mL          Specimen:   ID Type Source Tests Collected by Time   1 : VAGINAL WALL Tissue VAGINA EXCISION SURGICAL PATHOLOGY EXAM Bladimir Castillo MD 2024 1217        Staff:   Circulator: Ofe Soria  RN  Relief Circulator: Elvi Carson RN; Annemarie Moreau RN; Scott Fernandes RN  Relief Scrub: Phan Lezama  Scrub Person: Dipti Buck         Drains and/or Catheters:   Closed/Suction Drain 1 Right Groin Bulb 19 Fr. (Active)   Dressing Status Clean;Dry 04/04/24 1552   Status To bulb suction 04/04/24 1552       Urethral Catheter 16 Fr. (Active)   Site Assessment Clean;Skin intact 04/04/24 1552   Securement Method Securing device (Describe) 04/04/24 1552   Output (mL) 120 mL 04/04/24 1642   $ Urethral Catheter Charge Indwelling cath 04/04/24 1552       Tourniquet Times:         Implants:  Implants       Type Name Action Serial No.      Graft GRAFT, CLARIX CORD 1K, 8 X 3CM - [id number] Implanted      Tissue CTM Paste X-Large Implanted N/A                Findings: Severe ventral chordee of the penis.  This was released with excision of the scar.  Adequate stage II phalloplasty.  No glans formation performed.     Indications: Damian Galdamez is an 23 y.o. adult who is having surgery for Gender dysphoria [F64.9].  He has prior shaft only phalloplasty done using an ALT flap.  The phalloplasty itself was complicated by having to delay the for stage, then at the second stage having to leave the phallus partly open.  He eventually healed with a severe ventral stellate scar causing chordee.  He is interested in release of this chordee plus stage II phalloplasty without urethra.  We advised him that due to the extensive amount of work we would do the near phallus to straighten it out, we would not be able to do a glans plasty today.     The patient was seen in the preoperative area. The risks, benefits, complications, treatment options, non-operative alternatives, expected recovery and outcomes were discussed with the patient. The possibilities of reaction to medication, pulmonary aspiration, injury to surrounding structures, bleeding, recurrent infection, the need for additional procedures, failure to diagnose a  condition, and creating a complication requiring transfusion or operation were discussed with the patient. The patient concurred with the proposed plan, giving informed consent.  The site of surgery was properly noted/marked if necessary per policy. The patient has been actively warmed in preoperative area. Preoperative antibiotics have been ordered and given within 1 hours of incision. Venous thrombosis prophylaxis have been ordered including bilateral sequential compression devices and chemical prophylaxis     Procedure Details: Patient was brought to the operating suite and laid supine on the operating table.  Huddle was performed.  He was anesthetized and orotracheally intubated.  He was placed in dorsolithotomy position and prepped and draped in standard for sterile fashion.  We focused our attention on the phallus.  There was noted to be severe ventral chordee.  The length along the dorsum of the penis was 14 cm.  The length along the ventral aspect of the penis was 8 cm.  There was noted to be a stellate scar at the base of the phallus extending into the midportion of the phallus.  First of all, we released the phallus from the underlying labial tissue that it was adhered to.  This allowed the release of some of the contracture.  The midshaft of the phallus was then number top, and multiple inline Z-plasty incisions were made to allow for full release of the phallus.  We proceeded with debulking the phallus.  Subcutaneous adipose tissue was removed with electrocautery in a very judicious and methodical fashion taking sure not to devascularize any of our Z-plasty flaps and also not to injure the vascular pedicle.  After this was done, Phan Dinesh tubularization of the phallus with alternation of the Z-plasty flaps was done.  It appeared to have a aesthetic appearance.  The dimensions of the tissue rearrangement on the phallus was 12 cm x 5 cm equals 60 cm².  We had made back cuts at the base of the phallus  extending out laterally on each side.  This allowed for adjacent tissue transfer inferiorly to allow for more appropriate closure as well.  The dimensions here were 10 cm x 6 cm equals 60 cm².  Edges were Sharpoint, and closure of the phallus as well as a transverse incision along the base was done with dermal interrupted PDS sutures and 3-0 Monocryl on the skin.  Prior to closure, because of the fact that patient had prior scarring and contracture, CTM matrix was injected in the areas of the penile closure.  We then focused our attention on the clitoral burial.  Circumferential incision was made below the glans clitoris, and the clitoris was degloved.  Ventral attachments of the clitoris to the underlying urethral plate were also released.  The glans clitoris was de-epithelialized.  We proceeded to remove the clitoral skin as well as labia minora on both sides using electrocautery.  Once this was done, the clitoris was buried above the scrotum but below the phallus after the right side and undyed absorbable suture was used to zoe it on the skin.  We then focused our attention on the vaginectomy.  We injected the lower part of the vas vestibule with lidocaine and and epinephrine.  This was also injected and under beneath the vaginal mucosa.  Electrocautery was used to remove all the endodermal labia minora as well as vestibular tissue.  The vaginal canal itself was excised circumferentially for about half of the vaginal length.  Hemostasis was excellent.  For the most proximal part of the vaginal canal, we used ball tip electrocautery at a 66 setting to vaporize the mucosa.  Multiple passes were made.  Once this was done, cerclage sutures were placed throughout the vaginal canal to achieve closure.  At the level of the  diaphragm, couple of interrupted figure-of-eight PDS sutures were placed.  Once the vaginal canal was closed, we proceeded to outlying the labial flaps for scrotoplasty.  U-shaped labial flaps  were raised bilaterally up to the superior extent of the inferior pubic ramus on each side.  These flaps were then rotated 280 degrees to me the reconstructed tissue near the base of the phallus.  Staples were used to temporarily hold these flaps in place here.  At this point in time, we proceeded with a perineal urethrostomy in the perineal masculinization.  The perineum was closed in an elongated fashion using a combination of dermal 2-0 PDS's and Monocryl on the skin.  Total area of tissue mobilization to allow for perineal closure was 10 cm x 7 cm equals to 70 cm².  Once the perineal closure was done up to the level of the urethra, we then fashion the urethral edge.  The inferior part of the urethra where there was some vaginal mucosa was freshened to remove all vaginal mucosa.  Superiorly, the urethral plate was preserved to allow for a tension-free aesthetic closure.  We then performed perineal urethrostomy by advancing the urethral mucosa up to the level of the perineal masculinization skin flaps.  To reduce tension, back cuts were made on the skin flaps laterally.  Interrupted Vicryl sutures were used to mature the urethra to the perineum.  We cut to size multiple pieces of Clarinex-D 1 K umbilical cord and placed at underneath the scrotum area, as well as into the perineum.  This is to help prevent wound breakage, and should it happen, to allow to heal it faster.  We then proceeded with scrotoplasty.  The scrotum flaps had been moved to 180 degrees to near the base of the phallus.  Dermal closure was done with interrupted and running PDS and Vicryl.  Skin was closed with interrupted and running Monocryl.  Note that prior to closure of the scrotum, a Penrose drain was placed in the dependent portion exiting out of the midline incision.  The inferior aspect of the scrotum was matured to the superior aspect of the urethral plate to complete the perineal urethrostomy.  Drain stitches were placed.  And wound was  copiously cleaned.  Phallus remained well-perfused and healthy at the end.  This concluded the procedure     Complications:  None; patient tolerated the procedure well.    Disposition: PACU - hemodynamically stable.  Condition: stable          Alexander Escamilla MD

## 2024-04-04 NOTE — CARE PLAN
The patient's goals for the shift include      The clinical goals for the shift include Pain management      Problem: Pain - Adult  Goal: Verbalizes/displays adequate comfort level or baseline comfort level  Outcome: Progressing  Flowsheets (Taken 4/4/2024 1713)  Verbalizes/displays adequate comfort level or baseline comfort level: Assess pain using appropriate pain scale     Problem: Safety - Adult  Goal: Free from fall injury  Outcome: Progressing  Flowsheets (Taken 4/4/2024 1713)  Free from fall injury: Instruct family/caregiver on patient safety     Problem: Discharge Planning  Goal: Discharge to home or other facility with appropriate resources  Outcome: Progressing  Flowsheets (Taken 4/4/2024 1713)  Discharge to home or other facility with appropriate resources: Arrange for needed discharge resources and transportation as appropriate     Problem: Chronic Conditions and Co-morbidities  Goal: Patient's chronic conditions and co-morbidity symptoms are monitored and maintained or improved  Outcome: Progressing  Flowsheets (Taken 4/4/2024 1713)  Care Plan - Patient's Chronic Conditions and Co-Morbidity Symptoms are Monitored and Maintained or Improved: Update acute care plan with appropriate goals if chronic or comorbid symptoms are exacerbated and prevent overall improvement and discharge     Problem: Pain  Goal: Takes deep breaths with improved pain control throughout the shift  Outcome: Progressing  Goal: Turns in bed with improved pain control throughout the shift  Outcome: Progressing  Goal: Walks with improved pain control throughout the shift  Outcome: Progressing  Goal: Performs ADL's with improved pain control throughout shift  Outcome: Progressing  Goal: Participates in PT with improved pain control throughout the shift  Outcome: Progressing  Goal: Free from opioid side effects throughout the shift  Outcome: Progressing  Goal: Free from acute confusion related to pain meds throughout the shift  Outcome:  Progressing

## 2024-04-04 NOTE — ANESTHESIA PROCEDURE NOTES
Peripheral IV  Date/Time: 4/4/2024 10:50 AM  Inserted by: ADRIANO Paige-STEVE    Placement  Needle size: 20 G  Laterality: left  Location: wrist  Local anesthetic: none  Site prep: alcohol  Technique: anatomical landmarks  Attempts: 1

## 2024-04-05 VITALS
DIASTOLIC BLOOD PRESSURE: 55 MMHG | HEART RATE: 82 BPM | SYSTOLIC BLOOD PRESSURE: 108 MMHG | RESPIRATION RATE: 14 BRPM | TEMPERATURE: 98.6 F | HEIGHT: 68 IN | WEIGHT: 186.29 LBS | OXYGEN SATURATION: 98 % | BODY MASS INDEX: 28.23 KG/M2

## 2024-04-05 PROCEDURE — 99231 SBSQ HOSP IP/OBS SF/LOW 25: CPT | Performed by: NURSE PRACTITIONER

## 2024-04-05 PROCEDURE — 2500000001 HC RX 250 WO HCPCS SELF ADMINISTERED DRUGS (ALT 637 FOR MEDICARE OP): Performed by: NURSE PRACTITIONER

## 2024-04-05 PROCEDURE — 2500000004 HC RX 250 GENERAL PHARMACY W/ HCPCS (ALT 636 FOR OP/ED): Performed by: NURSE PRACTITIONER

## 2024-04-05 PROCEDURE — 2500000002 HC RX 250 W HCPCS SELF ADMINISTERED DRUGS (ALT 637 FOR MEDICARE OP, ALT 636 FOR OP/ED): Performed by: NURSE PRACTITIONER

## 2024-04-05 RX ORDER — OXYCODONE HYDROCHLORIDE 5 MG/1
5 TABLET ORAL EVERY 6 HOURS PRN
Qty: 28 TABLET | Refills: 0 | Status: SHIPPED | OUTPATIENT
Start: 2024-04-05 | End: 2024-04-12

## 2024-04-05 RX ORDER — BACITRACIN ZINC 500 UNIT/G
OINTMENT (GRAM) TOPICAL DAILY
Qty: 28 G | Refills: 3 | Status: SHIPPED | OUTPATIENT
Start: 2024-04-05 | End: 2024-05-05

## 2024-04-05 RX ORDER — SULFAMETHOXAZOLE AND TRIMETHOPRIM 800; 160 MG/1; MG/1
1 TABLET ORAL 2 TIMES DAILY
Qty: 10 TABLET | Refills: 0 | Status: SHIPPED | OUTPATIENT
Start: 2024-04-05 | End: 2024-04-10

## 2024-04-05 RX ORDER — POLYETHYLENE GLYCOL 3350 17 G/17G
17 POWDER, FOR SOLUTION ORAL DAILY
Qty: 14 PACKET | Refills: 0 | Status: SHIPPED | OUTPATIENT
Start: 2024-04-06 | End: 2024-04-20

## 2024-04-05 RX ADMIN — OXYCODONE HYDROCHLORIDE 10 MG: 5 TABLET ORAL at 04:06

## 2024-04-05 RX ADMIN — KETOROLAC TROMETHAMINE 15 MG: 30 INJECTION, SOLUTION INTRAMUSCULAR at 11:22

## 2024-04-05 RX ADMIN — OXYCODONE HYDROCHLORIDE 10 MG: 5 TABLET ORAL at 11:22

## 2024-04-05 RX ADMIN — KETOROLAC TROMETHAMINE 15 MG: 30 INJECTION, SOLUTION INTRAMUSCULAR at 06:03

## 2024-04-05 RX ADMIN — SODIUM CHLORIDE 100 ML/HR: 9 INJECTION, SOLUTION INTRAVENOUS at 06:03

## 2024-04-05 RX ADMIN — OXYBUTYNIN CHLORIDE 5 MG: 5 TABLET ORAL at 08:29

## 2024-04-05 RX ADMIN — ACETAMINOPHEN 975 MG: 325 TABLET ORAL at 06:02

## 2024-04-05 RX ADMIN — CEFAZOLIN SODIUM 2 G: 2 INJECTION, SOLUTION INTRAVENOUS at 08:29

## 2024-04-05 RX ADMIN — POLYETHYLENE GLYCOL 3350 17 G: 17 POWDER, FOR SOLUTION ORAL at 08:29

## 2024-04-05 RX ADMIN — ACETAMINOPHEN 975 MG: 325 TABLET ORAL at 11:22

## 2024-04-05 RX ADMIN — SODIUM CHLORIDE 100 ML/HR: 9 INJECTION, SOLUTION INTRAVENOUS at 00:09

## 2024-04-05 ASSESSMENT — PAIN SCALES - GENERAL
PAINLEVEL_OUTOF10: 8
PAINLEVEL_OUTOF10: 7
PAINLEVEL_OUTOF10: 3
PAINLEVEL_OUTOF10: 2

## 2024-04-05 ASSESSMENT — PAIN - FUNCTIONAL ASSESSMENT
PAIN_FUNCTIONAL_ASSESSMENT: 0-10

## 2024-04-05 ASSESSMENT — COGNITIVE AND FUNCTIONAL STATUS - GENERAL
MOBILITY SCORE: 24
DAILY ACTIVITIY SCORE: 24

## 2024-04-05 NOTE — CARE PLAN
The patient's goals for the shift include      The clinical goals for the shift include Pain management      Problem: Pain - Adult  Goal: Verbalizes/displays adequate comfort level or baseline comfort level  Outcome: Progressing     Problem: Safety - Adult  Goal: Free from fall injury  Outcome: Progressing     Problem: Pain  Goal: Takes deep breaths with improved pain control throughout the shift  Outcome: Progressing     Problem: Pain  Goal: Walks with improved pain control throughout the shift  Outcome: Progressing

## 2024-04-05 NOTE — CARE PLAN
The patient's goals for the shift include      The clinical goals for the shift include pain management      Problem: Pain - Adult  Goal: Verbalizes/displays adequate comfort level or baseline comfort level  Outcome: Progressing  Flowsheets (Taken 4/5/2024 1007)  Verbalizes/displays adequate comfort level or baseline comfort level: Assess pain using appropriate pain scale     Problem: Safety - Adult  Goal: Free from fall injury  Outcome: Progressing  Flowsheets (Taken 4/5/2024 1007)  Free from fall injury: Instruct family/caregiver on patient safety     Problem: Discharge Planning  Goal: Discharge to home or other facility with appropriate resources  Outcome: Progressing  Flowsheets (Taken 4/5/2024 1007)  Discharge to home or other facility with appropriate resources: Identify barriers to discharge with patient and caregiver     Problem: Chronic Conditions and Co-morbidities  Goal: Patient's chronic conditions and co-morbidity symptoms are monitored and maintained or improved  Outcome: Progressing  Flowsheets (Taken 4/5/2024 1007)  Care Plan - Patient's Chronic Conditions and Co-Morbidity Symptoms are Monitored and Maintained or Improved: Monitor and assess patient's chronic conditions and comorbid symptoms for stability, deterioration, or improvement     Problem: Pain  Goal: Takes deep breaths with improved pain control throughout the shift  Outcome: Progressing  Goal: Turns in bed with improved pain control throughout the shift  Outcome: Progressing  Goal: Walks with improved pain control throughout the shift  Outcome: Progressing  Goal: Performs ADL's with improved pain control throughout shift  Outcome: Progressing  Goal: Participates in PT with improved pain control throughout the shift  Outcome: Progressing  Goal: Free from opioid side effects throughout the shift  Outcome: Progressing  Goal: Free from acute confusion related to pain meds throughout the shift  Outcome: Progressing

## 2024-04-09 ENCOUNTER — OFFICE VISIT (OUTPATIENT)
Dept: UROLOGY | Facility: CLINIC | Age: 24
End: 2024-04-09
Payer: COMMERCIAL

## 2024-04-09 VITALS
DIASTOLIC BLOOD PRESSURE: 74 MMHG | SYSTOLIC BLOOD PRESSURE: 120 MMHG | HEART RATE: 77 BPM | WEIGHT: 188 LBS | TEMPERATURE: 98.3 F | BODY MASS INDEX: 28.59 KG/M2

## 2024-04-09 DIAGNOSIS — F64.9 GENDER DYSPHORIA: Primary | ICD-10-CM

## 2024-04-09 PROCEDURE — A4357 BEDSIDE DRAINAGE BAG: HCPCS | Performed by: NURSE PRACTITIONER

## 2024-04-09 PROCEDURE — 99024 POSTOP FOLLOW-UP VISIT: CPT | Performed by: NURSE PRACTITIONER

## 2024-04-09 PROCEDURE — A4358 URINARY LEG OR ABDOMEN BAG: HCPCS | Performed by: NURSE PRACTITIONER

## 2024-04-09 NOTE — PROGRESS NOTES
GENDER CARE POST-OP     HISTORY OF PRESENT ILLNESS:   Damian Galdamez is a 23 y.o. trans man s/p Stage 2 ALT without UL 4/4/24    INTERVAL HISTORY:  Returns today for POV  Seen unaccompanied  Reports no significant pain  No constipation  No drainage in CAMI, some drainage around it  Some swelling in phallus, hasn't been propping  Was anticipating getting drains and catheter out today  Planning on returning home to [place] if everything looks okay  Scheduled to go back to work at the beginning of May    PAST MEDICAL HISTORY:  No past medical history on file.    PAST SURGICAL HISTORY:  No past surgical history on file.     ALLERGIES:   No Known Allergies     MEDICATIONS:     Current Outpatient Medications:     acetaminophen (Tylenol) 500 mg tablet, Take 2 tablets (1,000 mg) by mouth every 6 hours if needed for mild pain (1 - 3)., Disp: 30 tablet, Rfl: 0    bacitracin 500 unit/gram ointment, Apply topically once daily. Apply to incisions and urethra opening daily, Disp: 28 g, Rfl: 3    ibuprofen 600 mg tablet, Take 1 tablet (600 mg) by mouth every 6 hours., Disp: , Rfl:     oxyCODONE (Roxicodone) 5 mg immediate release tablet, Take 1 tablet (5 mg) by mouth every 6 hours if needed for severe pain (7 - 10) for up to 7 days., Disp: 28 tablet, Rfl: 0    polyethylene glycol (Glycolax, Miralax) 17 gram packet, Take 17 g by mouth once daily for 14 days. Do not start before April 6, 2024., Disp: 14 packet, Rfl: 0    sulfamethoxazole-trimethoprim (Bactrim DS) 800-160 mg tablet, Take 1 tablet by mouth 2 times a day for 5 days., Disp: 10 tablet, Rfl: 0    testosterone cypionate (Depo-Testosterone) 100 mg/mL injection, Inject 0.6 mL (60 mg) into the muscle 1 (one) time per week. Last dose 4/3/24, Disp: , Rfl:      SOCIAL HISTORY:  Patient  reports that he has never smoked. He has never used smokeless tobacco. He reports current alcohol use. He reports current drug use. Drug: Marijuana.   Social History     Socioeconomic History    Marital  status: Significant Other     Spouse name: Not on file    Number of children: Not on file    Years of education: Not on file    Highest education level: Not on file   Occupational History    Not on file   Tobacco Use    Smoking status: Never    Smokeless tobacco: Never   Substance and Sexual Activity    Alcohol use: Yes     Comment: 1 drink every 2 weeks    Drug use: Yes     Types: Marijuana     Comment: medical reasons    Sexual activity: Defer   Other Topics Concern    Not on file   Social History Narrative    Not on file     Social Determinants of Health     Financial Resource Strain: Low Risk  (4/4/2024)    Overall Financial Resource Strain (CARDIA)     Difficulty of Paying Living Expenses: Not hard at all   Food Insecurity: Not on file   Transportation Needs: No Transportation Needs (4/4/2024)    PRAPARE - Transportation     Lack of Transportation (Medical): No     Lack of Transportation (Non-Medical): No   Physical Activity: Not on file   Stress: Not on file   Social Connections: Not on file   Intimate Partner Violence: Not on file   Housing Stability: Low Risk  (4/4/2024)    Housing Stability Vital Sign     Unable to Pay for Housing in the Last Year: No     Number of Places Lived in the Last Year: 1     Unstable Housing in the Last Year: No       FAMILY HISTORY:  No family history on file.    REVIEW OF SYSTEMS:  All systems reviewed, pertinent negatives as noted in HPI.    PHYSICAL EXAM:  There were no vitals taken for this visit.  Constitutional: Well-developed and well-nourished. No distress.    Head: Normocephalic and atraumatic.    Neck: Normal range of motion.    Pulmonary/Chest: Effort normal. No respiratory distress.   Abdominal: Nondistended.  : See below.  Integumentary: No rash or lesions.  Musculoskeletal: Normal range of motion.    Neurological: Alert and oriented.  Psychiatric: Normal mood and affect. Thought content normal.          LABORATORY REVIEW:   Lab Results   Component Value Date     BUN 14 11/11/2023    CREATININE 0.69 11/11/2023    EGFR >90 11/11/2023     11/11/2023    K 4.3 11/11/2023     11/11/2023    CO2 29 11/11/2023    CALCIUM 9.1 11/11/2023      Lab Results   Component Value Date    WBC 6.5 11/11/2023    RBC 4.09 11/11/2023    HGB 12.3 11/11/2023    HCT 38.2 11/11/2023    MCV 93 11/11/2023    MCH 30.1 11/11/2023    MCHC 32.2 11/11/2023    RDW 12.4 11/11/2023     11/11/2023    MPV 9.8 11/01/2023          Assessment:     Encounter Diagnosis   Name Primary?    Gender dysphoria Yes       Damian Galdamez is a 23 y.o. trans man s/p Stage 2 ALT without UL 4/4/24  Doing well post-op, some discomfort from drains; minimal drainage  Would like to schedule next stage before the end of the year    ?Small hematomas along Z-plasty suture line  CAMI is empty, Penrose with scant pink drainage; both removed now without difficulty  Crowe in perineal urethrostomy draining clear yellow urine     Plan:   Ok to return home today  Plan for catheter removal with local provider; need to confirm duration  Continue wound care with Xeroform, dry gauze  RTC in 3-4 weeks for reassessment or sooner if needed  Encouraged to call in the interim with any questions, concerns

## 2024-04-15 ENCOUNTER — TELEMEDICINE (OUTPATIENT)
Dept: UROLOGY | Facility: CLINIC | Age: 24
End: 2024-04-15
Payer: COMMERCIAL

## 2024-04-15 DIAGNOSIS — F64.9 GENDER DYSPHORIA: Primary | ICD-10-CM

## 2024-04-15 PROCEDURE — 99024 POSTOP FOLLOW-UP VISIT: CPT | Performed by: UROLOGY

## 2024-04-15 NOTE — PROGRESS NOTES
Status post 2 stage II phalloplasty with perineal urethrostomy as well as a Z-plasty of ventral penile curvature due to contracture.  This is a virtual follow-up  I looked at his pictures that he sent yesterday.  Had a long discussion with him.  Wound looks like his wounds are healing well.  I recommended that he can get his catheter out locally.  Will see him back at least once in person before his next operation but we do not need to schedule anything for right now.

## 2024-04-16 LAB
LABORATORY COMMENT REPORT: NORMAL
PATH REPORT.FINAL DX SPEC: NORMAL
PATH REPORT.GROSS SPEC: NORMAL
PATH REPORT.TOTAL CANCER: NORMAL

## 2024-04-19 NOTE — OP NOTE
1.  Inset of previously delayed left ALT phalloplasty-this required increased procedural time and service due  to significant adherence and scarring of ADM and previous flap  2.  Preparation of left wound bed for skin grafting, total 11 x 15 cm  3.  Split-thickness skin graft, 11 x 15 cm  4.  Placement of VAC greater than 50 cm  5.  Intraoperative ICG spy angiography for flap perfusion assessment  6.  Placement of Integra skin substitute     Anesthesia: Cosic, Ranjit  Estimated Blood Loss: 50  Findings: ~13cm Left ALT flap, doppler signals heard at the base and middle of the flap, Good perfusion of the flap using SPY. Doppler signals at the distal shaft at the end of the case.    Specimens(s) Collected: no,     Complications: None  Urine Output: 700  Drains and/or Catheters: perez catheter and Left thigh wound vac  Additional Details:       Dr Escamilla and I will serve as co-surgeons fro this case    Patient Returned To/Condition: PACU in a stable condition         Operative Report:     INDICATION  Damian is a 23-year-old transmale , who has met all WPATH criteria, previously we attempted ALT phalloplasty, but noted suboptimal perforators and perform surgical  delay with placement of ADM.  He returns today for inset and split-thickness skin graft.  The patient is indicated for the above-stated procedure.       INFORMED CONSENT  Patient was told the risks, benefits, INDICATIONS, contraindications, and alternatives to the procedure. Risks include but not limited to pain, infection, bleeding, hematoma, seroma, injury to neurovascular and tendinous structures, bulky flap, delayed  closure of flap required, partial flap loss, insensate phallus requiring second stage reconstruction, and need for subsequent surgeries.     The patient demonstrated understanding of these risks and agreed to proceed with surgery.   Advance directives discussed.  Team approach explained.      The patient consented and wished to proceed with the  procedure and for medical photography if needed.     PROCEDURAL PAUSE  Prior to the beginning of the procedure, the patient's correct identity, side, site, and procedure to be performed were verified.  The patient was given intravenous antibiotics prior to skin incision.     PROCEDURAL NOTE  Damian  was brought to the operative theater and was transferred operating room table.  All bony prominences were well padded,  and sequential compression devices were placed to each lower extremity. Patient was then secured with safety straps.    The patient was then placed under IV sedation, and our anesthesia colleagues administered general endotracheal anesthesia.    We began by marking the measurements of the previous ALT phalloplasty, we decreased the width of the phalloplasty approximately 2 cm on the lateral aspect.  We then reopened all incisions medially distally and made the new lateral incision.  We dissected  down medially and encountered a significant and exorbitant amount of scarring which is significantly delayed the procedure requiring increased procedural time and service.  We dissected meticulously to identify the previously placed acellular dermal matrix,  we dissected this distally and identified the 2 perforators which were previously marked and wrapped with ADM.  We then dissected laterally and inferiorly in order to free the flap, we performed the de-epithelialization of the left leg proximally to capture  venous perforators.  We reopened the medial incision and dissected free the flap as far as we could go medially, we then dissected deep to the rectus for Clayton muscle, following the descending branch of the lateral circumflex femoral artery as far medially  as its takeoff from the profunda.  We then dissected circumferentially around the rectus femoris and delivered the flap under the rectus femoris.  We then dissected circumferentially around the sartorius muscle to create a connection to the pubic  area.   At the pubis we created a circular incision in order to inset the flap.  From the pubic we dissected subcutaneously laterally until we were able to identify the previous pocket dissected from beneath the rectus and sartorius.  We carefully protected  the femoral arteries, thus created a pocket beneath the rectus femoris, sartorius and subcutaneous skin to deliver the phallus to the pubic area.  We then performed ICG angiography of the phallus before rolling it, proving excellent perfusion.  After this was completed we delivered the phallus into the pubic area, there was noted to be significant stricture upon the pedicle, pedicle was released  from all lateral connections to allow for more medialization.  We then incised and connected the pubic incision to the thigh dissection incision in order to release tension, we excised a portion of the sartorius muscle in order to create a trench to prevent  kinking on the pedicle.  After this was completed we dopplered the pedicle and confirmed Doppler inflow.      We then performed limited debulking of the flap, we attempted to roll the flap and close the flap but were unsure that the closure could be performed safely without compromising blood flow, and the decision was made to loosely close the phallus and place  Integra bilayer  over the ventrum.  We did so with spanning 2-0 PDS suture, and we inset an Integra bilayer matrix over the ventral portion of the phallus.  After this we began insetting the phallus, tacking the proximal dermal flag subdermally using circumferential interrupted simple 2-0 deep dermal suture.  This was followed by interrupted simple 3-0 Monocryl suture in the skin.  A Penrose drain  was left exiting on the inferior lateral portion of the closure.    We then turned our attention to closing the donor site.  We performed quadricepsplasty to close the interval between the rectus femoris and the vastus lateralis with a running 3-0 STRATAFIX   suture.  We then performed closure of the proximal medial extension incision using running 3-0 strata fix and 2-0 PDS, followed by subcuticular 3-0 STRATAFIX  suture.  We then performed circumferential imbricating/pursestring suture of the donor site to decrease the size from 14 x 16 to approximately 11 x 15 cm.  We then performed preparation of the wound bed in anticipation of skin grafting.  We turned attention to the contralateral thigh and obtained a split-thickness skin graft, 2 strips were taken, with a 3 inch guard, set to 1/12,000 of an inch, these were then meshed 3: 1.  We then customized the mesh skin graft to the left thigh donor  site with running 4-0 plain gut suture.  Over the right donor site we placed Kerecis omega-3 surgery close meshed 2: 1.  This  was followed by Xeroform which was stapled to the skin, Telfa, ABD, Ace bandage.  A VAC bolster was placed over the donor site on the left thigh and set to 125 mmHg covered by an ABD and Ace bandage.  Bulky dressing was in place circumferentially around the phallus, Doppler was identified and mesh panties were placed with a hole cut  out for the phallus.    Rectal Aspiron was placed    The patient tolerated the procedure well and was awakened from anesthesia without any difficulties, was transferred to the patient in stable condition, all needle counts were correct.     Electronic Signatures:  Bladimir Casitllo)  (Signed on 19-Sep-2023 11:03)   Authored    Last Updated: 19-Sep-2023 11:03 by Bladimir Castillo)

## 2024-04-23 DIAGNOSIS — F64.9 GENDER DYSPHORIA: ICD-10-CM

## 2024-04-23 RX ORDER — CELECOXIB 400 MG/1
400 CAPSULE ORAL ONCE
OUTPATIENT
Start: 2024-04-23 | End: 2024-04-23

## 2024-04-23 RX ORDER — ACETAMINOPHEN 325 MG/1
975 TABLET ORAL ONCE
OUTPATIENT
Start: 2024-04-23 | End: 2024-04-23

## 2024-04-23 RX ORDER — SODIUM CHLORIDE 9 MG/ML
100 INJECTION, SOLUTION INTRAVENOUS CONTINUOUS
OUTPATIENT
Start: 2024-04-23

## 2024-04-23 RX ORDER — CEFAZOLIN SODIUM 2 G/100ML
2 INJECTION, SOLUTION INTRAVENOUS ONCE
OUTPATIENT
Start: 2024-04-23 | End: 2024-04-23

## 2024-04-23 RX ORDER — METRONIDAZOLE 500 MG/100ML
500 INJECTION, SOLUTION INTRAVENOUS ONCE
OUTPATIENT
Start: 2024-04-23 | End: 2024-04-23

## 2024-10-22 DIAGNOSIS — Z01.818 PREOPERATIVE CLEARANCE: ICD-10-CM

## 2024-11-11 ENCOUNTER — APPOINTMENT (OUTPATIENT)
Dept: UROLOGY | Facility: CLINIC | Age: 24
End: 2024-11-11

## 2024-11-11 ENCOUNTER — LAB (OUTPATIENT)
Dept: LAB | Facility: LAB | Age: 24
End: 2024-11-11
Payer: COMMERCIAL

## 2024-11-11 VITALS — SYSTOLIC BLOOD PRESSURE: 116 MMHG | DIASTOLIC BLOOD PRESSURE: 74 MMHG | HEART RATE: 73 BPM

## 2024-11-11 DIAGNOSIS — Z01.818 PREOPERATIVE CLEARANCE: ICD-10-CM

## 2024-11-11 DIAGNOSIS — F64.9 GENDER DYSPHORIA: ICD-10-CM

## 2024-11-11 DIAGNOSIS — F64.9 GENDER DYSPHORIA: Primary | ICD-10-CM

## 2024-11-11 LAB
ALBUMIN SERPL BCP-MCNC: 4.3 G/DL (ref 3.4–5)
ALP SERPL-CCNC: 56 U/L (ref 33–120)
ALT SERPL W P-5'-P-CCNC: 23 U/L (ref 7–52)
ANION GAP SERPL CALC-SCNC: 9 MMOL/L (ref 10–20)
AST SERPL W P-5'-P-CCNC: 19 U/L (ref 9–39)
BILIRUB SERPL-MCNC: 0.9 MG/DL (ref 0–1.2)
BUN SERPL-MCNC: 16 MG/DL (ref 6–23)
CALCIUM SERPL-MCNC: 9.3 MG/DL (ref 8.6–10.3)
CAOX CRY #/AREA UR COMP ASSIST: ABNORMAL /HPF
CHLORIDE SERPL-SCNC: 102 MMOL/L (ref 98–107)
CO2 SERPL-SCNC: 30 MMOL/L (ref 21–32)
CREAT SERPL-MCNC: 0.9 MG/DL (ref 0.5–1.3)
EGFRCR SERPLBLD CKD-EPI 2021: >90 ML/MIN/1.73M*2
ERYTHROCYTE [DISTWIDTH] IN BLOOD BY AUTOMATED COUNT: 13.2 % (ref 11.5–14.5)
EST. AVERAGE GLUCOSE BLD GHB EST-MCNC: 100 MG/DL
GLUCOSE SERPL-MCNC: 106 MG/DL (ref 74–99)
HBA1C MFR BLD: 5.1 %
HCT VFR BLD AUTO: 45.3 % (ref 36–52)
HGB BLD-MCNC: 15 G/DL (ref 12–17.5)
MCH RBC QN AUTO: 29.2 PG (ref 26–34)
MCHC RBC AUTO-ENTMCNC: 33.1 G/DL (ref 32–36)
MCV RBC AUTO: 88 FL (ref 80–100)
NRBC BLD-RTO: 0 /100 WBCS (ref 0–0)
PLATELET # BLD AUTO: 230 X10*3/UL (ref 150–450)
POTASSIUM SERPL-SCNC: 4.1 MMOL/L (ref 3.5–5.3)
PROT SERPL-MCNC: 7.1 G/DL (ref 6.4–8.2)
RBC # BLD AUTO: 5.13 X10*6/UL (ref 4–5.9)
RBC #/AREA URNS AUTO: ABNORMAL /HPF
SODIUM SERPL-SCNC: 137 MMOL/L (ref 136–145)
WBC # BLD AUTO: 5.2 X10*3/UL (ref 4.4–11.3)
WBC #/AREA URNS AUTO: ABNORMAL /HPF

## 2024-11-11 PROCEDURE — 36415 COLL VENOUS BLD VENIPUNCTURE: CPT

## 2024-11-11 PROCEDURE — 80053 COMPREHEN METABOLIC PANEL: CPT

## 2024-11-11 PROCEDURE — 99214 OFFICE O/P EST MOD 30 MIN: CPT | Performed by: NURSE PRACTITIONER

## 2024-11-11 PROCEDURE — 85027 COMPLETE CBC AUTOMATED: CPT

## 2024-11-11 PROCEDURE — 83036 HEMOGLOBIN GLYCOSYLATED A1C: CPT

## 2024-11-11 PROCEDURE — 80323 ALKALOIDS NOS: CPT

## 2024-11-11 PROCEDURE — 87086 URINE CULTURE/COLONY COUNT: CPT

## 2024-11-11 PROCEDURE — 81001 URINALYSIS AUTO W/SCOPE: CPT

## 2024-11-11 RX ORDER — CHLORHEXIDINE GLUCONATE 40 MG/ML
SOLUTION TOPICAL ONCE
Qty: 118 ML | Refills: 0 | Status: SHIPPED | OUTPATIENT
Start: 2024-11-11 | End: 2024-11-11

## 2024-11-11 NOTE — PROGRESS NOTES
GENDER CARE POST-OP     HISTORY OF PRESENT ILLNESS:   Damian Galdamez is a 24 y.o. trans man s/p Stage 2 ALT without UL 4/4/24    INTERVAL HISTORY:  Returns today for FUV  Seen unaccompanied  Shares plan for testicular implants and glansplasty  Hoping to recover over João break  Urethra seems large, makes hygiene challenging  Notes buildup of yellow or green mucus  Using detachable showerhead to clean it out  Would like to schedule IPP placement for N[place]ia ASAP    PAST MEDICAL HISTORY:  No past medical history on file.    PAST SURGICAL HISTORY:  No past surgical history on file.     ALLERGIES:   No Known Allergies     MEDICATIONS:     Current Outpatient Medications:     methylphenidate HCl (CONCERTA ORAL), Take 18 mg by mouth once daily., Disp: , Rfl:     testosterone cypionate (Depo-Testosterone) 100 mg/mL injection, Inject 0.6 mL (60 mg) into the muscle 1 (one) time per week. Last dose 4/3/24, Disp: , Rfl:      SOCIAL HISTORY:  Patient  reports that he has never smoked. He has never used smokeless tobacco. He reports current alcohol use. He reports current drug use. Drug: Marijuana.   Social History     Socioeconomic History    Marital status: Significant Other     Spouse name: Not on file    Number of children: Not on file    Years of education: Not on file    Highest education level: Not on file   Occupational History    Not on file   Tobacco Use    Smoking status: Never    Smokeless tobacco: Never   Substance and Sexual Activity    Alcohol use: Yes     Comment: 1 drink every 2 weeks    Drug use: Yes     Types: Marijuana     Comment: medical reasons    Sexual activity: Defer   Other Topics Concern    Not on file   Social History Narrative    Not on file     Social Drivers of Health     Financial Resource Strain: Low Risk  (4/4/2024)    Overall Financial Resource Strain (CARDIA)     Difficulty of Paying Living Expenses: Not hard at all   Food Insecurity: Not on file   Transportation Needs: No Transportation  Needs (4/4/2024)    PRAPARE - Transportation     Lack of Transportation (Medical): No     Lack of Transportation (Non-Medical): No   Physical Activity: Not on file   Stress: Not on file   Social Connections: Not on file   Intimate Partner Violence: Not on file   Housing Stability: Low Risk  (4/4/2024)    Housing Stability Vital Sign     Unable to Pay for Housing in the Last Year: No     Number of Places Lived in the Last Year: 1     Unstable Housing in the Last Year: No       FAMILY HISTORY:  No family history on file.    REVIEW OF SYSTEMS:  All systems reviewed, pertinent negatives as noted in HPI.    PHYSICAL EXAM:  Visit Vitals  /74   Pulse 73     Constitutional: Well-developed and well-nourished. No distress.    Head: Normocephalic and atraumatic.    Neck: Normal range of motion.    Pulmonary/Chest: Effort normal. No respiratory distress.   Abdominal: Nondistended.  : See below.  Integumentary: No rash or lesions.  Musculoskeletal: Normal range of motion.    Neurological: Alert and oriented.  Psychiatric: Normal mood and affect. Thought content normal.      LABORATORY REVIEW:   Lab Results   Component Value Date    BUN 14 11/11/2023    CREATININE 0.69 11/11/2023    EGFR >90 11/11/2023     11/11/2023    K 4.3 11/11/2023     11/11/2023    CO2 29 11/11/2023    CALCIUM 9.1 11/11/2023      Lab Results   Component Value Date    WBC 6.5 11/11/2023    RBC 4.09 11/11/2023    HGB 12.3 11/11/2023    HCT 38.2 11/11/2023    MCV 93 11/11/2023    MCH 30.1 11/11/2023    MCHC 32.2 11/11/2023    RDW 12.4 11/11/2023     11/11/2023    MPV 9.8 11/01/2023          Assessment:     Encounter Diagnosis   Name Primary?    Gender dysphoria Yes       Damian Galdamez is a 23 y.o. trans man s/p Stage 2 ALT without UL 4/4/24  Scheduled for stage 3 (glansplasty & testicular implants) 12/19/24    Examined with Dr. Castillo  Moderate narrowing of phallus at base, wider at mid-shaft  Scrotum has healed nicely   Perineal  urethra about 1 cm in diameter     Plan:   Pre-op labs today  Consider ash wash bottle for hygiene  Discussed post-op expectations for healing, recovery  Need updated letter of support prior to scheduling IPP  Encouraged to call in the interim with any questions, concerns

## 2024-11-12 LAB — BACTERIA UR CULT: NORMAL

## 2024-11-16 LAB
ANABASINE UR-MCNC: <5 NG/ML
COTININE UR-MCNC: <15 NG/ML
NICOTINE UR-MCNC: <15 NG/ML
TRANS-3-OH-COTININE UR-MCNC: <50 NG/ML

## 2024-12-03 ENCOUNTER — TELEPHONE (OUTPATIENT)
Dept: UROLOGY | Facility: CLINIC | Age: 24
End: 2024-12-03
Payer: COMMERCIAL

## 2024-12-03 DIAGNOSIS — Z41.9 SURGERY, ELECTIVE: ICD-10-CM

## 2024-12-03 RX ORDER — CHLORHEXIDINE GLUCONATE 40 MG/ML
SOLUTION TOPICAL SEE ADMIN INSTRUCTIONS
Qty: 118 ML | Refills: 0 | Status: SHIPPED | OUTPATIENT
Start: 2024-12-03

## 2024-12-03 NOTE — TELEPHONE ENCOUNTER
Pre-Op Phone Call    Verified patient by name and date of birth. Done    Verified procedure and DOS. Done    Verified patient is planning for Outpatient Done    Verified letters of support in chart. Dates: 9.23 & 11.12 Done    Verified known drug allergies and updated as needed. Done    Verified medication list and updated as needed. Done    Verified pharmacy and updated as needed. Done    Surgical History Previous urological reconstruction    Anesthesia Yes, general with no issues    Blood Transfusion Never and OK if needed    Nicotine Never    Drug Use Current occasional marijuana/edibles    ETOH Occasionally    Social Support Mother    Living Accommodations House, Stairs, patient advised to limit stairs. and Primary living space all on one level    Financial Support Employed Full Time and Does NOT need FMLA/STD documentation completed.    Verified preop labs are ordered or resulted. Patient is using  Lab    Submitted prescriptions for preop prep of Chlorhexidine    Verified first follow-up visit is scheduled. Date: 12.23, 12.30 Done    Addressed patient's questions. Done    Encouraged patient to contact the team with any other questions or concerns. Done

## 2024-12-10 ENCOUNTER — TELEPHONE (OUTPATIENT)
Dept: UROLOGY | Facility: CLINIC | Age: 24
End: 2024-12-10
Payer: COMMERCIAL

## 2024-12-10 DIAGNOSIS — F64.9 GENDER DYSPHORIA: ICD-10-CM

## 2024-12-10 RX ORDER — CELECOXIB 400 MG/1
400 CAPSULE ORAL ONCE
OUTPATIENT
Start: 2024-12-10 | End: 2024-12-10

## 2024-12-10 RX ORDER — ACETAMINOPHEN 325 MG/1
975 TABLET ORAL ONCE
OUTPATIENT
Start: 2024-12-10 | End: 2024-12-10

## 2024-12-10 RX ORDER — CEFAZOLIN SODIUM 2 G/100ML
2 INJECTION, SOLUTION INTRAVENOUS ONCE
OUTPATIENT
Start: 2024-12-10 | End: 2024-12-10

## 2024-12-18 NOTE — PREPROCEDURE INSTRUCTIONS
Current Medications   Medication Instructions    methylphenidate HCl (CONCERTA ORAL) Hold    testosterone cypionate (Depo-Testosterone) 100 mg/mL injection {Taken on Tuesday           NPO Instructions:    Do not eat any food after midnight the night before your surgery/procedure.  You may have 13 ounces of clear liquids until TWO hours before surgery/procedure. This includes water, black tea/coffee, (no milk or cream) apple juice and electrolyte drinks (Gatorade).    Additional Instructions: Arrival 6:00 for 7:30 surgery    Enter through the main entrance of [facility], located at [address]. Proceed to registration, located on the right hand side of the staircase. You will need your ID and insurance card for registration. Please ensure you have a responsible adult to drive you home. A responsible adult DOES NOT include rideshare service drivers (Uber, Lyft, etc), cab drivers, or public transportation drivers, but “provide a ride” is acceptable.    Take a shower before your procedure. After your shower avoid lotions, powders, deodorants or anything applied to the skin. If you wear contacts or glasses, wear the glasses. If you do not have glasses, please bring a case for your contacts. You may wear hearing aids and dentures, bring a case for them or we will provide one. Make sure you wear something loose and comfortable. Keep in mind your surgical procedure and wear something that will accommodate incisions or bandages. Please remove all jewelry and piercing's.     For further questions Brenda MANN can be contacted at [phone] between 7AM-3PM.

## 2024-12-19 ENCOUNTER — ANESTHESIA (OUTPATIENT)
Dept: OPERATING ROOM | Facility: HOSPITAL | Age: 24
End: 2024-12-19
Payer: COMMERCIAL

## 2024-12-19 ENCOUNTER — HOSPITAL ENCOUNTER (OUTPATIENT)
Facility: HOSPITAL | Age: 24
Setting detail: OUTPATIENT SURGERY
Discharge: HOME | End: 2024-12-19
Attending: UROLOGY | Admitting: UROLOGY
Payer: COMMERCIAL

## 2024-12-19 ENCOUNTER — PHARMACY VISIT (OUTPATIENT)
Dept: PHARMACY | Facility: CLINIC | Age: 24
End: 2024-12-19
Payer: COMMERCIAL

## 2024-12-19 ENCOUNTER — ANESTHESIA EVENT (OUTPATIENT)
Dept: OPERATING ROOM | Facility: HOSPITAL | Age: 24
End: 2024-12-19
Payer: COMMERCIAL

## 2024-12-19 VITALS
RESPIRATION RATE: 16 BRPM | DIASTOLIC BLOOD PRESSURE: 55 MMHG | HEART RATE: 62 BPM | SYSTOLIC BLOOD PRESSURE: 120 MMHG | OXYGEN SATURATION: 96 % | WEIGHT: 188.05 LBS | BODY MASS INDEX: 28.5 KG/M2 | TEMPERATURE: 97.7 F | HEIGHT: 68 IN

## 2024-12-19 DIAGNOSIS — F64.9 GENDER DYSPHORIA: ICD-10-CM

## 2024-12-19 PROCEDURE — A54450 PR PREPUTIAL STRETCHING: Performed by: ANESTHESIOLOGY

## 2024-12-19 PROCEDURE — 3600000003 HC OR TIME - INITIAL BASE CHARGE - PROCEDURE LEVEL THREE: Performed by: UROLOGY

## 2024-12-19 PROCEDURE — 2720000007 HC OR 272 NO HCPCS: Performed by: UROLOGY

## 2024-12-19 PROCEDURE — 2500000005 HC RX 250 GENERAL PHARMACY W/O HCPCS: Performed by: UROLOGY

## 2024-12-19 PROCEDURE — 15240 FTH/GFT F/C/C/M/N/AX/G/H/F20: CPT | Performed by: UROLOGY

## 2024-12-19 PROCEDURE — 2500000004 HC RX 250 GENERAL PHARMACY W/ HCPCS (ALT 636 FOR OP/ED): Performed by: UROLOGY

## 2024-12-19 PROCEDURE — 7100000010 HC PHASE TWO TIME - EACH INCREMENTAL 1 MINUTE: Performed by: UROLOGY

## 2024-12-19 PROCEDURE — A54450 PR PREPUTIAL STRETCHING: Performed by: NURSE ANESTHETIST, CERTIFIED REGISTERED

## 2024-12-19 PROCEDURE — 7100000002 HC RECOVERY ROOM TIME - EACH INCREMENTAL 1 MINUTE: Performed by: UROLOGY

## 2024-12-19 PROCEDURE — 7100000001 HC RECOVERY ROOM TIME - INITIAL BASE CHARGE: Performed by: UROLOGY

## 2024-12-19 PROCEDURE — C1889 IMPLANT/INSERT DEVICE, NOC: HCPCS | Performed by: UROLOGY

## 2024-12-19 PROCEDURE — 54660 REVISION OF TESTIS: CPT | Performed by: UROLOGY

## 2024-12-19 PROCEDURE — 2500000004 HC RX 250 GENERAL PHARMACY W/ HCPCS (ALT 636 FOR OP/ED): Performed by: ANESTHESIOLOGY

## 2024-12-19 PROCEDURE — 3700000002 HC GENERAL ANESTHESIA TIME - EACH INCREMENTAL 1 MINUTE: Performed by: UROLOGY

## 2024-12-19 PROCEDURE — RXMED WILLOW AMBULATORY MEDICATION CHARGE

## 2024-12-19 PROCEDURE — 14041 TIS TRNFR F/C/C/M/N/A/G/H/F: CPT | Performed by: UROLOGY

## 2024-12-19 PROCEDURE — 2500000001 HC RX 250 WO HCPCS SELF ADMINISTERED DRUGS (ALT 637 FOR MEDICARE OP): Performed by: NURSE PRACTITIONER

## 2024-12-19 PROCEDURE — 7100000009 HC PHASE TWO TIME - INITIAL BASE CHARGE: Performed by: UROLOGY

## 2024-12-19 PROCEDURE — 3700000001 HC GENERAL ANESTHESIA TIME - INITIAL BASE CHARGE: Performed by: UROLOGY

## 2024-12-19 PROCEDURE — 2780000003 HC OR 278 NO HCPCS: Performed by: UROLOGY

## 2024-12-19 PROCEDURE — 3600000008 HC OR TIME - EACH INCREMENTAL 1 MINUTE - PROCEDURE LEVEL THREE: Performed by: UROLOGY

## 2024-12-19 PROCEDURE — 2500000004 HC RX 250 GENERAL PHARMACY W/ HCPCS (ALT 636 FOR OP/ED): Performed by: NURSE ANESTHETIST, CERTIFIED REGISTERED

## 2024-12-19 DEVICE — IMPLANT, CTM FLOW, CONNECTIVE TISSUE, 4.0ML: Type: IMPLANTABLE DEVICE | Site: PENIS | Status: FUNCTIONAL

## 2024-12-19 RX ORDER — CEFAZOLIN 1 G/1
INJECTION, POWDER, FOR SOLUTION INTRAVENOUS AS NEEDED
Status: DISCONTINUED | OUTPATIENT
Start: 2024-12-19 | End: 2024-12-19

## 2024-12-19 RX ORDER — ONDANSETRON HYDROCHLORIDE 2 MG/ML
4 INJECTION, SOLUTION INTRAVENOUS ONCE AS NEEDED
Status: DISCONTINUED | OUTPATIENT
Start: 2024-12-19 | End: 2024-12-19 | Stop reason: HOSPADM

## 2024-12-19 RX ORDER — METRONIDAZOLE 500 MG/100ML
500 INJECTION, SOLUTION INTRAVENOUS ONCE
Status: DISCONTINUED | OUTPATIENT
Start: 2024-12-19 | End: 2024-12-19 | Stop reason: HOSPADM

## 2024-12-19 RX ORDER — LIDOCAINE HYDROCHLORIDE AND EPINEPHRINE 10; 10 UG/ML; MG/ML
INJECTION, SOLUTION INFILTRATION; PERINEURAL AS NEEDED
Status: DISCONTINUED | OUTPATIENT
Start: 2024-12-19 | End: 2024-12-19 | Stop reason: HOSPADM

## 2024-12-19 RX ORDER — MEPERIDINE HYDROCHLORIDE 25 MG/ML
12.5 INJECTION INTRAMUSCULAR; INTRAVENOUS; SUBCUTANEOUS EVERY 10 MIN PRN
Status: DISCONTINUED | OUTPATIENT
Start: 2024-12-19 | End: 2024-12-19 | Stop reason: HOSPADM

## 2024-12-19 RX ORDER — GLYCOPYRROLATE 0.2 MG/ML
INJECTION INTRAMUSCULAR; INTRAVENOUS AS NEEDED
Status: DISCONTINUED | OUTPATIENT
Start: 2024-12-19 | End: 2024-12-19

## 2024-12-19 RX ORDER — KETOROLAC TROMETHAMINE 30 MG/ML
INJECTION, SOLUTION INTRAMUSCULAR; INTRAVENOUS AS NEEDED
Status: DISCONTINUED | OUTPATIENT
Start: 2024-12-19 | End: 2024-12-19

## 2024-12-19 RX ORDER — ACETAMINOPHEN 325 MG/1
650 TABLET ORAL EVERY 4 HOURS PRN
Status: DISCONTINUED | OUTPATIENT
Start: 2024-12-19 | End: 2024-12-19 | Stop reason: HOSPADM

## 2024-12-19 RX ORDER — SODIUM CHLORIDE 9 MG/ML
100 INJECTION, SOLUTION INTRAVENOUS CONTINUOUS
Status: DISCONTINUED | OUTPATIENT
Start: 2024-12-19 | End: 2024-12-19 | Stop reason: HOSPADM

## 2024-12-19 RX ORDER — IPRATROPIUM BROMIDE 0.5 MG/2.5ML
500 SOLUTION RESPIRATORY (INHALATION) ONCE
Status: DISCONTINUED | OUTPATIENT
Start: 2024-12-19 | End: 2024-12-19 | Stop reason: HOSPADM

## 2024-12-19 RX ORDER — ACETAMINOPHEN 325 MG/1
975 TABLET ORAL ONCE
Status: COMPLETED | OUTPATIENT
Start: 2024-12-19 | End: 2024-12-19

## 2024-12-19 RX ORDER — SODIUM CHLORIDE, SODIUM LACTATE, POTASSIUM CHLORIDE, CALCIUM CHLORIDE 600; 310; 30; 20 MG/100ML; MG/100ML; MG/100ML; MG/100ML
INJECTION, SOLUTION INTRAVENOUS CONTINUOUS PRN
Status: DISCONTINUED | OUTPATIENT
Start: 2024-12-19 | End: 2024-12-19

## 2024-12-19 RX ORDER — DEXAMETHASONE SODIUM PHOSPHATE 10 MG/ML
6 INJECTION INTRAMUSCULAR; INTRAVENOUS ONCE
Status: DISCONTINUED | OUTPATIENT
Start: 2024-12-19 | End: 2024-12-19 | Stop reason: HOSPADM

## 2024-12-19 RX ORDER — SCOPOLAMINE 1 MG/3D
1 PATCH, EXTENDED RELEASE TRANSDERMAL ONCE
Status: DISCONTINUED | OUTPATIENT
Start: 2024-12-19 | End: 2024-12-19 | Stop reason: HOSPADM

## 2024-12-19 RX ORDER — CELECOXIB 100 MG/1
400 CAPSULE ORAL ONCE
Status: COMPLETED | OUTPATIENT
Start: 2024-12-19 | End: 2024-12-19

## 2024-12-19 RX ORDER — MIDAZOLAM HYDROCHLORIDE 1 MG/ML
INJECTION, SOLUTION INTRAMUSCULAR; INTRAVENOUS AS NEEDED
Status: DISCONTINUED | OUTPATIENT
Start: 2024-12-19 | End: 2024-12-19

## 2024-12-19 RX ORDER — CEFAZOLIN SODIUM 2 G/100ML
2 INJECTION, SOLUTION INTRAVENOUS ONCE
Status: DISCONTINUED | OUTPATIENT
Start: 2024-12-19 | End: 2024-12-19 | Stop reason: HOSPADM

## 2024-12-19 RX ORDER — LIDOCAINE HYDROCHLORIDE 20 MG/ML
INJECTION, SOLUTION EPIDURAL; INFILTRATION; INTRACAUDAL; PERINEURAL AS NEEDED
Status: DISCONTINUED | OUTPATIENT
Start: 2024-12-19 | End: 2024-12-19

## 2024-12-19 RX ORDER — LIDOCAINE HYDROCHLORIDE 10 MG/ML
0.1 INJECTION, SOLUTION INFILTRATION; PERINEURAL ONCE
Status: DISCONTINUED | OUTPATIENT
Start: 2024-12-19 | End: 2024-12-19 | Stop reason: HOSPADM

## 2024-12-19 RX ORDER — FENTANYL CITRATE 50 UG/ML
INJECTION, SOLUTION INTRAMUSCULAR; INTRAVENOUS AS NEEDED
Status: DISCONTINUED | OUTPATIENT
Start: 2024-12-19 | End: 2024-12-19

## 2024-12-19 RX ORDER — KETOROLAC TROMETHAMINE 30 MG/ML
30 INJECTION, SOLUTION INTRAMUSCULAR; INTRAVENOUS ONCE AS NEEDED
Status: DISCONTINUED | OUTPATIENT
Start: 2024-12-19 | End: 2024-12-19 | Stop reason: HOSPADM

## 2024-12-19 RX ORDER — ONDANSETRON HYDROCHLORIDE 2 MG/ML
INJECTION, SOLUTION INTRAVENOUS AS NEEDED
Status: DISCONTINUED | OUTPATIENT
Start: 2024-12-19 | End: 2024-12-19

## 2024-12-19 RX ORDER — ALBUTEROL SULFATE 0.83 MG/ML
2.5 SOLUTION RESPIRATORY (INHALATION) ONCE AS NEEDED
Status: DISCONTINUED | OUTPATIENT
Start: 2024-12-19 | End: 2024-12-19 | Stop reason: HOSPADM

## 2024-12-19 RX ORDER — SODIUM CHLORIDE 0.9 G/100ML
IRRIGANT IRRIGATION AS NEEDED
Status: DISCONTINUED | OUTPATIENT
Start: 2024-12-19 | End: 2024-12-19 | Stop reason: HOSPADM

## 2024-12-19 RX ORDER — DIPHENHYDRAMINE HYDROCHLORIDE 50 MG/ML
12.5 INJECTION INTRAMUSCULAR; INTRAVENOUS ONCE AS NEEDED
Status: DISCONTINUED | OUTPATIENT
Start: 2024-12-19 | End: 2024-12-19 | Stop reason: HOSPADM

## 2024-12-19 RX ORDER — PROPOFOL 10 MG/ML
INJECTION, EMULSION INTRAVENOUS AS NEEDED
Status: DISCONTINUED | OUTPATIENT
Start: 2024-12-19 | End: 2024-12-19

## 2024-12-19 RX ORDER — OXYCODONE HYDROCHLORIDE 5 MG/1
5 TABLET ORAL EVERY 6 HOURS PRN
Qty: 15 TABLET | Refills: 0 | Status: SHIPPED | OUTPATIENT
Start: 2024-12-19

## 2024-12-19 SDOH — HEALTH STABILITY: MENTAL HEALTH: CURRENT SMOKER: 1

## 2024-12-19 ASSESSMENT — PAIN DESCRIPTION - DESCRIPTORS
DESCRIPTORS: SORE

## 2024-12-19 ASSESSMENT — PAIN - FUNCTIONAL ASSESSMENT
PAIN_FUNCTIONAL_ASSESSMENT: 0-10

## 2024-12-19 ASSESSMENT — COLUMBIA-SUICIDE SEVERITY RATING SCALE - C-SSRS
6. HAVE YOU EVER DONE ANYTHING, STARTED TO DO ANYTHING, OR PREPARED TO DO ANYTHING TO END YOUR LIFE?: NO
2. HAVE YOU ACTUALLY HAD ANY THOUGHTS OF KILLING YOURSELF?: NO
1. IN THE PAST MONTH, HAVE YOU WISHED YOU WERE DEAD OR WISHED YOU COULD GO TO SLEEP AND NOT WAKE UP?: NO

## 2024-12-19 ASSESSMENT — PAIN SCALES - GENERAL
PAINLEVEL_OUTOF10: 0 - NO PAIN
PAINLEVEL_OUTOF10: 2
PAINLEVEL_OUTOF10: 0 - NO PAIN
PAIN_LEVEL: 0
PAINLEVEL_OUTOF10: 4
PAINLEVEL_OUTOF10: 0 - NO PAIN
PAINLEVEL_OUTOF10: 2
PAINLEVEL_OUTOF10: 1

## 2024-12-19 ASSESSMENT — PAIN DESCRIPTION - LOCATION: LOCATION: OTHER (COMMENT)

## 2024-12-19 NOTE — ANESTHESIA PREPROCEDURE EVALUATION
Patient: Damian Galdamez    Procedure Information       Date/Time: 12/19/24 4299    Procedures:       BILATERAL TESTICLE IMPLANT INSERTION (Bilateral) - Bilateral Testicular implants, Glansplasty      GLANSPLASTY    Location: [facility] 04 / [facility]    Surgeons: Bladimri Castillo MD            Relevant Problems   ID   (+) Wound infection       Clinical information reviewed:    Allergies  Meds  Problems              NPO Detail:  No data recorded     Physical Exam    Airway  Mallampati: III  TM distance: <3 FB  Neck ROM: full     Cardiovascular - normal exam  Rhythm: regular  Rate: normal     Dental - normal exam     Pulmonary - normal exam     Abdominal            Anesthesia Plan    History of general anesthesia?: yes  History of complications of general anesthesia?: no    ASA 2     general     The patient is a current smoker.  Patient was previously instructed to abstain from smoking on day of procedure.    intravenous induction   Postoperative administration of opioids is intended.  Trial extubation is planned.  Anesthetic plan and risks discussed with patient.    Plan discussed with CRNA.

## 2024-12-19 NOTE — DISCHARGE INSTRUCTIONS
Call Dr. Castillo for any problems and/or concerns    Call Doctor right away for:    *Fever above 100.4/shaking chills  *Trouble urinating or burning with urination  *Severe pain or bloating in your abdomen  *Persistent nausea/vomiting  *Redness, swelling, or drainage at your incision sites  *Chest pain/shortness of breath-call 911.    -  If you are able to take them, alternate a dose of Acetaminophen (Tylenol) and Ibuprofen (Motrin) every 3 hours. (For example, 1000mg of Tylenol at 09:00am, 600mg ibuprofen at 12:00pm, 1000mg of Tylenol at 3:00pm, 600mg ibuprofen at 6:00pm).   - Take Oxycodone for breakthrough pain  - Keep wound vac on until Monday 12/23.

## 2024-12-19 NOTE — OP NOTE
BILATERAL TESTICLE IMPLANT INSERTION (B), GLANSPLASTY Operative Note     Date: 2024  OR Location: [facility]    Name: Damian Galdamez, : 2000, Age: 24 y.o., MRN: [id number], Sex: adult    Diagnosis  Pre-op Diagnosis      * Gender dysphoria [F64.9] Post-op Diagnosis     * Gender dysphoria [F64.9]     Procedures  BILATERAL TESTICLE IMPLANT INSERTION  53841 - TX INSJ TESTICULAR PROSTH SEPARATE PROCEDURE    GLANSPLASTY  50050 - TX FORESKN MANJ W/LSS PREPUTIAL ADS&STRETCHING    TX ADJNT TIS TRNSFR/REARGMT ANY AREA 30.1-60 SQ CM [34043]  TX SUB GRFT F/S/N/H/F/G/M/D <100SQ CM 1ST 25 SQ CM [65102]  Surgeons      * Bladimir Castillo - Primary    Resident/Fellow/Other Assistant:  Surgeons and Role:  * No surgeons found with a matching role *    Staff:   Circulator: Kristine  Circulator: Marley Burr Person: Denise  Surgical Assistant: Phan    Anesthesia Staff: Anesthesiologist: Magdiel Azevedo MD  CRNA: ADRIANO Christiansen-CRNA, DNP    Procedure Summary  Anesthesia: General  ASA: II  Estimated Blood Loss: 10mL  Intra-op Medications:   Administrations occurring from 0730 to 0930 on 24:   Medication Name Total Dose   lidocaine-epinephrine (Xylocaine W/EPI) 1 %-1:100,000 injection 10 mL   sodium chloride 0.9 % irrigation solution 1,000 mL   ceFAZolin (Ancef) vial 1 g 2 g   dexAMETHasone 4 mg/mL 8 mg   fentaNYL (Sublimaze) injection 50 mcg/mL 100 mcg   glycopyrrolate (Robinul) injection 0.2 mg   ketorolac (Toradol) injection 30 mg 30 mg   lidocaine PF (Xylocaine-MPF)  2 % 50 mg   ondansetron 2 mg/mL 4 mg   propofol (Diprivan) injection 10 mg/mL 200 mg          Anesthesia Record               Intraprocedure I/O Totals          Intake    LR infusion 700.00 mL    Total Intake 700 mL          Specimen: No specimens collected     Drains and/or Catheters: * None in log *    Tourniquet Times:       Implants:  Implants       Type Name Action Serial No.      Implant IMPLANT, CTM FLOW, CONNECTIVE TISSUE, 4.0ML - [id number] -  [id number] [facility] [id number]              Findings: normal gross anatomy     Indications: Damian Galdamez is an 24 y.o. trans man s/p Stage 2 ALT without UL 4/4/24. Planned for bilateral testicular implants and glansplasty today ahead of IPP placement in 6/2025.     The patient was seen in the preoperative area. The risks, benefits, complications, treatment options, non-operative alternatives, expected recovery and outcomes were discussed with the patient. The possibilities of reaction to medication, pulmonary aspiration, injury to surrounding structures, bleeding, recurrent infection, the need for additional procedures, failure to diagnose a condition, and creating a complication requiring transfusion or operation were discussed with the patient. The patient concurred with the proposed plan, giving informed consent.  The site of surgery was properly noted/marked if necessary per policy. The patient has been actively warmed in preoperative area. Preoperative antibiotics have been ordered and given within 1 hours of incision. Venous thrombosis prophylaxis have been ordered including bilateral sequential compression devices    Procedure Details:   The patient was brought to the operating room and placed supine on the operating room table.  An institutional timeout was called.  Prior to the induction of general anesthesia, the patient received prophylactic IV antibiotics.  The patient then underwent general anesthesia.  He was prepped and draped in the usual sterile fashion.    We began with placement of the bilateral testicular prostheses.  We made an incision along the lateral edge of the right scrotum. A pouch was bluntly dissected in the neoscrotum on the left side. A AART medium testicular implant filled with 16cc sterile water was placed in the pouches and secured with with a 3-0 vicryl dartos stitch to prevent proximal migration. At this point, excellent hemostasis was noted and the wounds copiously irrigated  with irricept irrigation. We injected Floseal into the incisions. The incisions were closed in 2 layers with 3-0 Vicryl for a deep layer and the skin was closed with running 4-0 Monocryl in a running baseball fashion.  The same procedure was repeated on the left side.    We then focused our attention on the glans plasty.  Orientation lines were placed along the neophallus at 12, 3 and 9:00.  Once this was done, we marked out incision lines for the coronal sulcus.  This was done about 30% of the way back from the meatus.  Skin incision was made and carried down ventrally in a concentric fashion to mimic the frenulum of the glans.  Skin incision had been carried down to the subcutaneous tissue.  A distally plate based flap was then raised for about a centimeter and a half.  Once this flap was raised, this was folded upon itself and a Taiban technique.  Total area of tissue mobilization was 9 cm x 2 cm equals to 18 cm².  Mattress sutures were placed.  We then harvested a full-thickness skin graft from the right ASIS area lateral to the hairline.  This was thinned out and placed in saline.  The donor site was closed with a Monocryl.  This graft was then brought over to the penile skin wound.  This was secured with interrupted 4-0 Monocryl.  The skin graft was pie crusted to allow drainage of subcutaneous fluid.  We then used a wound VAC on the skin graft site on the penis.     This concluded the procedure. All counts were correct. The patient was awakened from anesthesia and taken to the PACU in stable conditio    Complications:  None; patient tolerated the procedure well.    Disposition: PACU - hemodynamically stable.  Condition: stable   Additional Details: Follow up Monday 12/23    Attending Attestation: I was present and scrubbed for the entire procedure.    Bladimir Castillo  Phone Number: [phone]

## 2024-12-19 NOTE — ANESTHESIA PROCEDURE NOTES
Airway  Date/Time: 12/19/2024 7:41 AM  Urgency: elective    Airway not difficult    Staffing  Performed: CRNA   Authorized by: Magdiel Azevedo MD    Performed by: ADRIANO Christiansen-STEVE, DNP  Patient location during procedure: OR    Indications and Patient Condition  Indications for airway management: anesthesia  Spontaneous ventilation: present  Sedation level: deep  Preoxygenated: yes  Patient position: sniffing  MILS maintained throughout  Mask difficulty assessment: 0 - not attempted    Final Airway Details  Final airway type: supraglottic airway      Successful airway: classic  Size 4 (I-Gel)     Number of attempts at approach: 1  Ventilation between attempts: none  Number of other approaches attempted: 0

## 2024-12-19 NOTE — SIGNIFICANT EVENT
Discharge instructions reviewed and provided to patient and patients mom. Patient denies questions related to material reviewed.

## 2024-12-19 NOTE — ANESTHESIA POSTPROCEDURE EVALUATION
Patient: Damian Galdamez    Procedure Summary       Date: 12/19/24 Room / Location: [facility] 04 / [facility]    Anesthesia Start: 0725 Anesthesia Stop: 0947    Procedures:       BILATERAL TESTICLE IMPLANT INSERTION (Bilateral: Groin)      GLANSPLASTY (Groin) Diagnosis:       Gender dysphoria      (Gender dysphoria [F64.9])    Surgeons: Bladimir Castillo MD Responsible Provider: Magdiel Azevedo MD    Anesthesia Type: general ASA Status: 2            Anesthesia Type: general    Vitals Value Taken Time   /62 12/19/24 1030   Temp 36.2 °C (97.2 °F) 12/19/24 1030   Pulse 88 12/19/24 1030   Resp 16 12/19/24 1030   SpO2 98 % 12/19/24 1030       Anesthesia Post Evaluation    Patient location during evaluation: PACU  Patient participation: waiting for patient participation  Level of consciousness: awake  Pain score: 0  Pain management: adequate  Airway patency: patent  Cardiovascular status: acceptable and hemodynamically stable  Respiratory status: nasal cannula  Hydration status: acceptable  Postoperative Nausea and Vomiting: none        There were no known notable events for this encounter.

## 2024-12-19 NOTE — H&P
"History Of Present Illness  Damian Galdamez is a 24 y.o. trans man s/p Stage 2 ALT without UL 4/4/24  Shares plan for testicular implants and glansplasty  Hoping to recover over João break  Urethra seems large, makes hygiene challenging  Notes buildup of yellow or green mucus  Using detachable showerhead to clean it out  Would like to schedule IPP placement for Nubia ASA     Past Medical History  He has no past medical history on file.    Surgical History  He has no past surgical history on file.     Social History  He reports that he has never smoked. He has never used smokeless tobacco. He reports current alcohol use. He reports current drug use. Drug: Marijuana.    Family History  No family history on file.     Allergies  Patient has no known allergies.    Review of Systems     Physical Exam  Constitutional: Well-developed and well-nourished. No distress.    Head: Normocephalic and atraumatic.    Neck: Normal range of motion.    Pulmonary/Chest: Effort normal. No respiratory distress.   Abdominal: Nondistended.  : See below.  Integumentary: No rash or lesions.  Musculoskeletal: Normal range of motion.    Neurological: Alert and oriented.  Psychiatric: Normal mood and affect. Thought content normal     Last Recorded Vitals  Blood pressure 132/75, pulse 67, temperature 36.3 °C (97.3 °F), temperature source Temporal, resp. rate 18, height 1.727 m (5' 8\"), weight 85.3 kg (188 lb 0.8 oz), SpO2 97%.    Relevant Results    No results found for this or any previous visit (from the past 24 hours).    No results found.       Assessment/Plan   Assessment & Plan  Gender dysphoria      Plan for bilateral testicular implants and glansplasty       Ravindra Christina MD    "

## 2024-12-23 ENCOUNTER — APPOINTMENT (OUTPATIENT)
Dept: UROLOGY | Facility: CLINIC | Age: 24
End: 2024-12-23
Payer: COMMERCIAL

## 2024-12-23 VITALS
DIASTOLIC BLOOD PRESSURE: 76 MMHG | WEIGHT: 188 LBS | TEMPERATURE: 97.8 F | HEIGHT: 68 IN | SYSTOLIC BLOOD PRESSURE: 127 MMHG | BODY MASS INDEX: 28.49 KG/M2 | HEART RATE: 80 BPM

## 2024-12-23 DIAGNOSIS — F64.9 GENDER DYSPHORIA: Primary | ICD-10-CM

## 2024-12-23 PROCEDURE — 99024 POSTOP FOLLOW-UP VISIT: CPT | Performed by: UROLOGY

## 2024-12-23 PROCEDURE — 3008F BODY MASS INDEX DOCD: CPT | Performed by: UROLOGY

## 2024-12-23 PROCEDURE — 1036F TOBACCO NON-USER: CPT | Performed by: UROLOGY

## 2024-12-23 RX ORDER — CEPHALEXIN 500 MG/1
500 CAPSULE ORAL 3 TIMES DAILY
Qty: 21 CAPSULE | Refills: 0 | Status: SHIPPED | OUTPATIENT
Start: 2024-12-23 | End: 2024-12-30

## 2024-12-23 NOTE — PROGRESS NOTES
UROLOGIC FOLLOW-UP VISIT     PROBLEM LIST:  S/p testicular implant and glansplasty     HISTORY OF PRESENT ILLNESS:   Damian Galdamez is a 24 y.o. trans man s/p Stage 2 ALT without UL 4/4/24. Now s/p bilateral testicular implants and glansplasty 12/19/24.     Doing well. Pain controlled. States that the right testicular implant is riding up a bit. Voiding well. Wound vac has been alarming but on suction.     PAST MEDICAL HISTORY:  No past medical history on file.    PAST SURGICAL HISTORY:  No past surgical history on file.     ALLERGIES:   No Known Allergies     MEDICATIONS:   Current Outpatient Medications on File Prior to Visit   Medication Sig Dispense Refill    oxyCODONE (Roxicodone) 5 mg immediate release tablet Take 1 tablet (5 mg) by mouth every 6 hours if needed for severe pain (7 - 10). 15 tablet 0    testosterone cypionate (Depo-Testosterone) 100 mg/mL injection Inject 0.6 mL (60 mg) into the muscle 1 (one) time per week. Last dose 4/3/24      methylphenidate HCl (CONCERTA ORAL) Take 18 mg by mouth once daily. (Patient not taking: Reported on 12/23/2024)      [DISCONTINUED] chlorhexidine (Hibiclens) 4 % external liquid Apply topically see administration instructions for 2 doses. See Preoperative Instructions document for detailed instructions. 118 mL 0     No current facility-administered medications on file prior to visit.        SOCIAL HISTORY:  Patient  reports that he has never smoked. He has never used smokeless tobacco. He reports current alcohol use. He reports current drug use. Drug: Marijuana.   Social History     Socioeconomic History    Marital status: Significant Other     Spouse name: Not on file    Number of children: Not on file    Years of education: Not on file    Highest education level: Not on file   Occupational History    Not on file   Tobacco Use    Smoking status: Never    Smokeless tobacco: Never   Substance and Sexual Activity    Alcohol use: Yes     Comment: 1 drink every 2 weeks     Drug use: Yes     Types: Marijuana     Comment: medical reasons    Sexual activity: Defer   Other Topics Concern    Not on file   Social History Narrative    Not on file     Social Drivers of Health     Financial Resource Strain: Low Risk  (4/4/2024)    Overall Financial Resource Strain (CARDIA)     Difficulty of Paying Living Expenses: Not hard at all   Food Insecurity: Not on file   Transportation Needs: No Transportation Needs (4/4/2024)    PRAPARE - Transportation     Lack of Transportation (Medical): No     Lack of Transportation (Non-Medical): No   Physical Activity: Not on file   Stress: Not on file   Social Connections: Not on file   Intimate Partner Violence: Not on file   Housing Stability: Low Risk  (4/4/2024)    Housing Stability Vital Sign     Unable to Pay for Housing in the Last Year: No     Number of Places Lived in the Last Year: 1     Unstable Housing in the Last Year: No       FAMILY HISTORY:  No family history on file.    REVIEW OF SYSTEMS:  Negative except as reported above    PHYSICAL EXAM:  Visit Vitals  /76   Pulse 80   Temp 36.6 °C (97.8 °F)     Constitutional: Well-developed and well-nourished. No distress.    Head: Normocephalic and atraumatic.    Neck: Normal range of motion.     Pulmonary/Chest: Effort normal. No respiratory distress.   Abdominal: Non-distended.  : glansplasty site with good take, lateral scrotal incisions healing well. Right implant sitting slightly high and posterior in scrotum.   Integumentary: No rash or lesions visualized.  Musculoskeletal: Normal range of motion.    Neurological: Alert and oriented.  Psychiatric: Normal mood and affect. Thought content normal.      LABORATORY REVIEW:   Lab Results   Component Value Date    BUN 16 11/11/2024    CREATININE 0.90 11/11/2024    EGFR >90 11/11/2024     11/11/2024    K 4.1 11/11/2024     11/11/2024    CO2 30 11/11/2024    CALCIUM 9.3 11/11/2024      Lab Results   Component Value Date    WBC 5.2  "11/11/2024    RBC 5.13 11/11/2024    HGB 15.0 11/11/2024    HCT 45.3 11/11/2024    MCV 88 11/11/2024    MCH 29.2 11/11/2024    MCHC 33.1 11/11/2024    RDW 13.2 11/11/2024     11/11/2024    MPV 9.8 11/01/2023        No results found for: \"PSA\"        Assessment:   Damian Galdamez is a 24 y.o. trans man s/p Stage 2 ALT without UL 4/4/24. Now s/p bilateral testicular implants and glansplasty 12/19/24. Doing well post-op. Right implant is riding a bit high and posterior.      Plan:   - Wound vac removed. Pictures in media. Instructed to dress site with Bacitracin, xeroform, gauze, coban   - Instructed to gently pull right implant down when pain is improved. However, this will likely be replaced by the IPP pump in the future regardless.   - We will send him a week of antibiotics  - We will see him virtually next week and in person in 4-6 weeks.   "

## 2024-12-30 ENCOUNTER — APPOINTMENT (OUTPATIENT)
Dept: UROLOGY | Facility: CLINIC | Age: 24
End: 2024-12-30
Payer: COMMERCIAL

## 2024-12-30 ENCOUNTER — PATIENT MESSAGE (OUTPATIENT)
Dept: UROLOGY | Facility: CLINIC | Age: 24
End: 2024-12-30

## 2024-12-30 ENCOUNTER — TELEPHONE (OUTPATIENT)
Dept: UROLOGY | Facility: CLINIC | Age: 24
End: 2024-12-30

## 2024-12-30 DIAGNOSIS — F64.9 GENDER DYSPHORIA: Primary | ICD-10-CM

## 2024-12-30 DIAGNOSIS — T81.31XD POSTOPERATIVE WOUND DEHISCENCE, SUBSEQUENT ENCOUNTER: Primary | ICD-10-CM

## 2024-12-30 PROBLEM — G89.18 ACUTE POSTOPERATIVE PAIN: Status: RESOLVED | Noted: 2023-11-01 | Resolved: 2024-12-30

## 2024-12-30 PROBLEM — L08.9 WOUND INFECTION: Status: RESOLVED | Noted: 2023-11-06 | Resolved: 2024-12-30

## 2024-12-30 PROBLEM — F42.9 OBSESSIVE-COMPULSIVE DISORDER: Status: ACTIVE | Noted: 2018-02-26

## 2024-12-30 PROBLEM — T14.8XXA WOUND INFECTION: Status: RESOLVED | Noted: 2023-11-06 | Resolved: 2024-12-30

## 2024-12-30 PROCEDURE — 1036F TOBACCO NON-USER: CPT | Performed by: NURSE PRACTITIONER

## 2024-12-30 PROCEDURE — 99024 POSTOP FOLLOW-UP VISIT: CPT | Performed by: NURSE PRACTITIONER

## 2024-12-30 NOTE — TELEPHONE ENCOUNTER
Spoke with Damian by phone after reviewing pictures with Dr. Castillo. Unfortunately graft doesn't look healthy but no intervention is warranted at this time. Advised to try Medihoney in lieu of bacitracin in the interim & update us with any changes.

## 2024-12-30 NOTE — PROGRESS NOTES
GENDER CARE POST-OP     HISTORY OF PRESENT ILLNESS:   Damian Galdamez is a 24 y.o. trans man s/p Stage 2 ALT without UL 4/4/24  Glansplasty and testicular implant placement 12/19/24    INTERVAL HISTORY:  Returns today for FUV  Seen unaccompanied  Doing well, very minimal pain  Mostly noticeable when up for a longer period, or sitting  Implant on R high, not amenable to gentle massage so far  Incisions healing well  Hip incision essentially healed  Scant leakage from L testicular  Glans still looks very dark, a little bit of red  Wondering if this is slow healing or graft failure  Doing Bacitracin, Xeroform, gauze BID  Was drying out when doing daily dressings  No fever or chills    Going back to work 1/6/25    PAST MEDICAL HISTORY:  Past Medical History:   Diagnosis Date    Gender dysphoria 11/01/2023       PAST SURGICAL HISTORY:  No past surgical history on file.     ALLERGIES:   No Known Allergies     MEDICATIONS:     Current Outpatient Medications:     cephalexin (Keflex) 500 mg capsule, Take 1 capsule (500 mg) by mouth 3 times a day for 7 days., Disp: 21 capsule, Rfl: 0    methylphenidate HCl (CONCERTA ORAL), Take 18 mg by mouth once daily. (Patient not taking: Reported on 12/23/2024), Disp: , Rfl:     oxyCODONE (Roxicodone) 5 mg immediate release tablet, Take 1 tablet (5 mg) by mouth every 6 hours if needed for severe pain (7 - 10)., Disp: 15 tablet, Rfl: 0    testosterone cypionate (Depo-Testosterone) 100 mg/mL injection, Inject 0.6 mL (60 mg) into the muscle 1 (one) time per week. Last dose 4/3/24, Disp: , Rfl:      SOCIAL HISTORY:  Patient  reports that he has never smoked. He has never used smokeless tobacco. He reports current alcohol use. He reports current drug use. Drug: Marijuana.   Social History     Socioeconomic History    Marital status: Significant Other     Spouse name: Not on file    Number of children: Not on file    Years of education: Not on file    Highest education level: Not on file    Occupational History    Not on file   Tobacco Use    Smoking status: Never    Smokeless tobacco: Never   Substance and Sexual Activity    Alcohol use: Yes     Comment: 1 drink every 2 weeks    Drug use: Yes     Types: Marijuana     Comment: medical reasons    Sexual activity: Defer   Other Topics Concern    Not on file   Social History Narrative    Not on file     Social Drivers of Health     Financial Resource Strain: Low Risk  (4/4/2024)    Overall Financial Resource Strain (CARDIA)     Difficulty of Paying Living Expenses: Not hard at all   Food Insecurity: Not on file   Transportation Needs: No Transportation Needs (4/4/2024)    PRAPARE - Transportation     Lack of Transportation (Medical): No     Lack of Transportation (Non-Medical): No   Physical Activity: Not on file   Stress: Not on file   Social Connections: Not on file   Intimate Partner Violence: Not on file   Housing Stability: Low Risk  (4/4/2024)    Housing Stability Vital Sign     Unable to Pay for Housing in the Last Year: No     Number of Places Lived in the Last Year: 1     Unstable Housing in the Last Year: No       FAMILY HISTORY:  No family history on file.    REVIEW OF SYSTEMS:  All systems reviewed, pertinent negatives as noted in HPI.    PHYSICAL EXAM:  There were no vitals taken for this visit.    Constitutional: Well-developed and well-nourished. No distress.    Head: Normocephalic and atraumatic.    Neck: Normal range of motion.    Pulmonary/Chest: Effort normal. No respiratory distress.   Integumentary: No rash or lesions.  Musculoskeletal: Normal range of motion.    Neurological: Alert and oriented.  Psychiatric: Normal mood and affect. Thought content normal.      LABORATORY REVIEW:   Lab Results   Component Value Date    BUN 16 11/11/2024    CREATININE 0.90 11/11/2024    EGFR >90 11/11/2024     11/11/2024    K 4.1 11/11/2024     11/11/2024    CO2 30 11/11/2024    CALCIUM 9.3 11/11/2024      Lab Results   Component Value  Date    WBC 5.2 11/11/2024    RBC 5.13 11/11/2024    HGB 15.0 11/11/2024    HCT 45.3 11/11/2024    MCV 88 11/11/2024    MCH 29.2 11/11/2024    MCHC 33.1 11/11/2024    RDW 13.2 11/11/2024     11/11/2024    MPV 9.8 11/01/2023          Assessment:     Encounter Diagnosis   Name Primary?    Gender dysphoria Yes       Damian Galdamez is a 23 y.o. trans man s/p Stage 2 ALT without UL 4/4/24  Stage 3 (glansplasty & testicular implants) 12/19/24  Doing very well post-op  Some concern about sulcus graft     Plan:   Will send photos for review later this morning  If necessary, consider referral back to local wound care clinic  Encouraged to call in the interim with any questions, concerns

## 2025-02-03 ENCOUNTER — APPOINTMENT (OUTPATIENT)
Dept: UROLOGY | Facility: CLINIC | Age: 25
End: 2025-02-03
Payer: COMMERCIAL

## 2025-02-03 ENCOUNTER — OFFICE VISIT (OUTPATIENT)
Dept: UROLOGY | Facility: CLINIC | Age: 25
End: 2025-02-03
Payer: COMMERCIAL

## 2025-02-03 VITALS
DIASTOLIC BLOOD PRESSURE: 84 MMHG | SYSTOLIC BLOOD PRESSURE: 123 MMHG | BODY MASS INDEX: 28.19 KG/M2 | HEART RATE: 83 BPM | WEIGHT: 186 LBS | HEIGHT: 68 IN

## 2025-02-03 DIAGNOSIS — F64.9 GENDER DYSPHORIA: Primary | ICD-10-CM

## 2025-02-03 DIAGNOSIS — Z01.818 PRE-OPERATIVE CLEARANCE: ICD-10-CM

## 2025-02-03 LAB
POC APPEARANCE, URINE: CLEAR
POC BILIRUBIN, URINE: NEGATIVE
POC BLOOD, URINE: NEGATIVE
POC COLOR, URINE: YELLOW
POC GLUCOSE, URINE: NEGATIVE MG/DL
POC KETONES, URINE: NEGATIVE MG/DL
POC LEUKOCYTES, URINE: NEGATIVE
POC NITRITE,URINE: NEGATIVE
POC PH, URINE: 6 PH
POC PROTEIN, URINE: ABNORMAL MG/DL
POC SPECIFIC GRAVITY, URINE: >=1.03
POC UROBILINOGEN, URINE: 0.2 EU/DL

## 2025-02-03 PROCEDURE — 81003 URINALYSIS AUTO W/O SCOPE: CPT | Performed by: NURSE PRACTITIONER

## 2025-02-03 PROCEDURE — 99024 POSTOP FOLLOW-UP VISIT: CPT | Performed by: NURSE PRACTITIONER

## 2025-02-03 PROCEDURE — 3008F BODY MASS INDEX DOCD: CPT | Performed by: NURSE PRACTITIONER

## 2025-02-03 NOTE — PROGRESS NOTES
GENDER CARE POST-OP     HISTORY OF PRESENT ILLNESS:   Damian Galdamez is a 24 y.o. trans man s/p Stage 2 ALT without UL 4/4/24  Glansplasty and testicular implant placement 12/19/24    INTERVAL HISTORY:  Returns today for FUV  Seen unaccompanied  Still working on healing sulcus  Feels like R implant deeper than L, R is too flat  Recently noticing spots of blood with wiping  No changes in stream  Doesn't feel any irritation  Asking about types of penile implants    PAST MEDICAL HISTORY:  Past Medical History:   Diagnosis Date    Gender dysphoria 11/01/2023       PAST SURGICAL HISTORY:  No past surgical history on file.     ALLERGIES:   No Known Allergies     MEDICATIONS:     Current Outpatient Medications:     testosterone cypionate (Depo-Testosterone) 100 mg/mL injection, Inject 0.6 mL (60 mg) into the muscle 1 (one) time per week. Last dose 4/3/24, Disp: , Rfl:      SOCIAL HISTORY:  Patient  reports that he has never smoked. He has never used smokeless tobacco. He reports current alcohol use. He reports current drug use. Drug: Marijuana.   Social History     Socioeconomic History    Marital status: Significant Other     Spouse name: Not on file    Number of children: Not on file    Years of education: Not on file    Highest education level: Not on file   Occupational History    Not on file   Tobacco Use    Smoking status: Never    Smokeless tobacco: Never   Substance and Sexual Activity    Alcohol use: Yes     Comment: 1 drink every 2 weeks    Drug use: Yes     Types: Marijuana     Comment: medical reasons    Sexual activity: Defer   Other Topics Concern    Not on file   Social History Narrative    Not on file     Social Drivers of Health     Financial Resource Strain: Low Risk  (4/4/2024)    Overall Financial Resource Strain (CARDIA)     Difficulty of Paying Living Expenses: Not hard at all   Food Insecurity: Not on file   Transportation Needs: No Transportation Needs (4/4/2024)    PRAPARE - Transportation     Lack  of Transportation (Medical): No     Lack of Transportation (Non-Medical): No   Physical Activity: Not on file   Stress: Not on file   Social Connections: Not on file   Intimate Partner Violence: Not on file   Housing Stability: Low Risk  (4/4/2024)    Housing Stability Vital Sign     Unable to Pay for Housing in the Last Year: No     Number of Places Lived in the Last Year: 1     Unstable Housing in the Last Year: No       FAMILY HISTORY:  No family history on file.    REVIEW OF SYSTEMS:  All systems reviewed, pertinent negatives as noted in HPI.    PHYSICAL EXAM:  Visit Vitals  /84   Pulse 83     Constitutional: Well-developed and well-nourished. No distress.    Head: Normocephalic and atraumatic.    Neck: Normal range of motion.    Pulmonary/Chest: Effort normal. No respiratory distress.   Integumentary: No rash or lesions.  GI: Nondistended.  : See below.  Musculoskeletal: Normal range of motion.    Neurological: Alert and oriented.  Psychiatric: Normal mood and affect. Thought content normal.          LABORATORY REVIEW:   Lab Results   Component Value Date    BUN 16 11/11/2024    CREATININE 0.90 11/11/2024    EGFR >90 11/11/2024     11/11/2024    K 4.1 11/11/2024     11/11/2024    CO2 30 11/11/2024    CALCIUM 9.3 11/11/2024      Lab Results   Component Value Date    WBC 5.2 11/11/2024    RBC 5.13 11/11/2024    HGB 15.0 11/11/2024    HCT 45.3 11/11/2024    MCV 88 11/11/2024    MCH 29.2 11/11/2024    MCHC 33.1 11/11/2024    RDW 13.2 11/11/2024     11/11/2024    MPV 9.8 11/01/2023          Assessment:     Encounter Diagnosis   Name Primary?    Gender dysphoria Yes       Damian Galdamez is a 23 y.o. trans man s/p Stage 2 ALT without UL 4/4/24  Stage 3 (glansplasty & testicular implants) 12/19/24  Doing well post-op  Continues wound care for sulcus  Almost completely healed  No broken skin or irritation of perineal urethra    Male on new insurance, prefers we let him know so employer isn't  notified     Plan:   Urine for UA, culture today  IPP scheduled for June  RTC 5-7 days post-op  Then 4 week FUV for cycling  Encouraged to call in the interim with any questions, concerns

## 2025-02-05 LAB — BACTERIA UR CULT: NORMAL

## 2025-04-08 DIAGNOSIS — Z01.818 PREOPERATIVE CLEARANCE: ICD-10-CM

## 2025-05-23 ENCOUNTER — TELEPHONE (OUTPATIENT)
Age: 25
End: 2025-05-23
Payer: COMMERCIAL

## 2025-05-23 RX ORDER — METHYLPHENIDATE HYDROCHLORIDE 18 MG/1
18 TABLET ORAL EVERY MORNING
COMMUNITY

## 2025-05-23 NOTE — TELEPHONE ENCOUNTER
Pre-Op Phone Call    Verified patient by name and date of birth. Done    Verified procedure and DOS. Done    Verified patient is planning for Outpatient Done    Verified letters of support in chart. Dates: 9.23.24 & 11.12.24     Verified known drug allergies and updated as needed.     Verified medication list and updated as needed.     Verified pharmacy and updated as needed. Done    Surgical History Previous urological reconstruction    Anesthesia Yes, general with no issues    Blood Transfusion Never and OK if needed    Nicotine Never    Drug Use Current occasional marijuana/edibles    ETOH Occasionally    Social Support Mother and Sibling(s)    Living Accommodations House, Stairs, patient advised to limit stairs. and Primary living space all on one level    Financial Support Employed Full Time and Does NOT need FMLA/STD documentation completed.    Verified preop labs are ordered or resulted. Patient is using outside lab    Verified first follow-up visit is scheduled. Date: 6.24 Done    Addressed patient's questions.     Encouraged patient to contact the team with any other questions or concerns.

## 2025-06-19 ENCOUNTER — HOSPITAL ENCOUNTER (OUTPATIENT)
Facility: HOSPITAL | Age: 25
Setting detail: OUTPATIENT SURGERY
Discharge: HOME | End: 2025-06-19
Attending: UROLOGY | Admitting: UROLOGY
Payer: COMMERCIAL

## 2025-06-19 ENCOUNTER — PHARMACY VISIT (OUTPATIENT)
Dept: PHARMACY | Facility: CLINIC | Age: 25
End: 2025-06-19
Payer: COMMERCIAL

## 2025-06-19 ENCOUNTER — ANESTHESIA (OUTPATIENT)
Dept: OPERATING ROOM | Facility: HOSPITAL | Age: 25
End: 2025-06-19
Payer: COMMERCIAL

## 2025-06-19 ENCOUNTER — ANESTHESIA EVENT (OUTPATIENT)
Dept: OPERATING ROOM | Facility: HOSPITAL | Age: 25
End: 2025-06-19
Payer: COMMERCIAL

## 2025-06-19 VITALS
BODY MASS INDEX: 27.74 KG/M2 | HEART RATE: 67 BPM | DIASTOLIC BLOOD PRESSURE: 63 MMHG | TEMPERATURE: 97 F | WEIGHT: 183 LBS | RESPIRATION RATE: 16 BRPM | OXYGEN SATURATION: 97 % | HEIGHT: 68 IN | SYSTOLIC BLOOD PRESSURE: 107 MMHG

## 2025-06-19 DIAGNOSIS — F64.9 GENDER DYSPHORIA: ICD-10-CM

## 2025-06-19 PROCEDURE — 7100000001 HC RECOVERY ROOM TIME - INITIAL BASE CHARGE: Performed by: UROLOGY

## 2025-06-19 PROCEDURE — 3600000008 HC OR TIME - EACH INCREMENTAL 1 MINUTE - PROCEDURE LEVEL THREE: Performed by: UROLOGY

## 2025-06-19 PROCEDURE — A54405 PR INSERT,INFLATABLE PENILE PROSTHESIS: Performed by: NURSE ANESTHETIST, CERTIFIED REGISTERED

## 2025-06-19 PROCEDURE — RXMED WILLOW AMBULATORY MEDICATION CHARGE

## 2025-06-19 PROCEDURE — 7100000002 HC RECOVERY ROOM TIME - EACH INCREMENTAL 1 MINUTE: Performed by: UROLOGY

## 2025-06-19 PROCEDURE — 2500000004 HC RX 250 GENERAL PHARMACY W/ HCPCS (ALT 636 FOR OP/ED): Performed by: STUDENT IN AN ORGANIZED HEALTH CARE EDUCATION/TRAINING PROGRAM

## 2025-06-19 PROCEDURE — A54405 PR INSERT,INFLATABLE PENILE PROSTHESIS: Performed by: ANESTHESIOLOGY

## 2025-06-19 PROCEDURE — 3600000003 HC OR TIME - INITIAL BASE CHARGE - PROCEDURE LEVEL THREE: Performed by: UROLOGY

## 2025-06-19 PROCEDURE — C1768 GRAFT, VASCULAR: HCPCS | Performed by: UROLOGY

## 2025-06-19 PROCEDURE — 14041 TIS TRNFR F/C/C/M/N/A/G/H/F: CPT | Performed by: UROLOGY

## 2025-06-19 PROCEDURE — 2500000004 HC RX 250 GENERAL PHARMACY W/ HCPCS (ALT 636 FOR OP/ED)

## 2025-06-19 PROCEDURE — 2500000004 HC RX 250 GENERAL PHARMACY W/ HCPCS (ALT 636 FOR OP/ED): Performed by: UROLOGY

## 2025-06-19 PROCEDURE — 54405 INSERT MULTI-COMP PENIS PROS: CPT | Performed by: UROLOGY

## 2025-06-19 PROCEDURE — 2500000001 HC RX 250 WO HCPCS SELF ADMINISTERED DRUGS (ALT 637 FOR MEDICARE OP): Performed by: UROLOGY

## 2025-06-19 PROCEDURE — 54520 REMOVAL OF TESTIS: CPT | Performed by: UROLOGY

## 2025-06-19 PROCEDURE — 2500000004 HC RX 250 GENERAL PHARMACY W/ HCPCS (ALT 636 FOR OP/ED): Performed by: NURSE ANESTHETIST, CERTIFIED REGISTERED

## 2025-06-19 PROCEDURE — 7100000010 HC PHASE TWO TIME - EACH INCREMENTAL 1 MINUTE: Performed by: UROLOGY

## 2025-06-19 PROCEDURE — 2500000004 HC RX 250 GENERAL PHARMACY W/ HCPCS (ALT 636 FOR OP/ED): Mod: JZ | Performed by: ANESTHESIOLOGY

## 2025-06-19 PROCEDURE — C1813 PROSTHESIS, PENILE, INFLATAB: HCPCS | Performed by: UROLOGY

## 2025-06-19 PROCEDURE — 3700000002 HC GENERAL ANESTHESIA TIME - EACH INCREMENTAL 1 MINUTE: Performed by: UROLOGY

## 2025-06-19 PROCEDURE — 7100000009 HC PHASE TWO TIME - INITIAL BASE CHARGE: Performed by: UROLOGY

## 2025-06-19 PROCEDURE — 2780000003 HC OR 278 NO HCPCS: Performed by: UROLOGY

## 2025-06-19 PROCEDURE — 2720000007 HC OR 272 NO HCPCS: Performed by: UROLOGY

## 2025-06-19 PROCEDURE — 3700000001 HC GENERAL ANESTHESIA TIME - INITIAL BASE CHARGE: Performed by: UROLOGY

## 2025-06-19 DEVICE — IMPLANTABLE DEVICE: Type: IMPLANTABLE DEVICE | Site: PENIS | Status: FUNCTIONAL

## 2025-06-19 DEVICE — IMPLANTABLE DEVICE: Type: IMPLANTABLE DEVICE | Site: ABDOMEN | Status: FUNCTIONAL

## 2025-06-19 RX ORDER — MIDAZOLAM HYDROCHLORIDE 1 MG/ML
INJECTION, SOLUTION INTRAMUSCULAR; INTRAVENOUS AS NEEDED
Status: DISCONTINUED | OUTPATIENT
Start: 2025-06-19 | End: 2025-06-19

## 2025-06-19 RX ORDER — FENTANYL CITRATE 50 UG/ML
INJECTION, SOLUTION INTRAMUSCULAR; INTRAVENOUS AS NEEDED
Status: DISCONTINUED | OUTPATIENT
Start: 2025-06-19 | End: 2025-06-19

## 2025-06-19 RX ORDER — LIDOCAINE HYDROCHLORIDE 20 MG/ML
INJECTION, SOLUTION INFILTRATION; PERINEURAL AS NEEDED
Status: DISCONTINUED | OUTPATIENT
Start: 2025-06-19 | End: 2025-06-19

## 2025-06-19 RX ORDER — IPRATROPIUM BROMIDE 0.5 MG/2.5ML
500 SOLUTION RESPIRATORY (INHALATION) ONCE
Status: DISCONTINUED | OUTPATIENT
Start: 2025-06-19 | End: 2025-06-19 | Stop reason: HOSPADM

## 2025-06-19 RX ORDER — ONDANSETRON HYDROCHLORIDE 2 MG/ML
INJECTION, SOLUTION INTRAVENOUS AS NEEDED
Status: DISCONTINUED | OUTPATIENT
Start: 2025-06-19 | End: 2025-06-19

## 2025-06-19 RX ORDER — CELECOXIB 100 MG/1
400 CAPSULE ORAL ONCE
Status: COMPLETED | OUTPATIENT
Start: 2025-06-19 | End: 2025-06-19

## 2025-06-19 RX ORDER — SODIUM CHLORIDE, SODIUM LACTATE, POTASSIUM CHLORIDE, CALCIUM CHLORIDE 600; 310; 30; 20 MG/100ML; MG/100ML; MG/100ML; MG/100ML
100 INJECTION, SOLUTION INTRAVENOUS CONTINUOUS
Status: DISCONTINUED | OUTPATIENT
Start: 2025-06-19 | End: 2025-06-19 | Stop reason: HOSPADM

## 2025-06-19 RX ORDER — DEXMEDETOMIDINE IN 0.9 % NACL 20 MCG/5ML
SYRINGE (ML) INTRAVENOUS AS NEEDED
Status: DISCONTINUED | OUTPATIENT
Start: 2025-06-19 | End: 2025-06-19

## 2025-06-19 RX ORDER — SULFAMETHOXAZOLE AND TRIMETHOPRIM 800; 160 MG/1; MG/1
1 TABLET ORAL 2 TIMES DAILY
Qty: 14 TABLET | Refills: 0 | Status: SHIPPED | OUTPATIENT
Start: 2025-06-19 | End: 2025-06-26

## 2025-06-19 RX ORDER — CEFAZOLIN SODIUM 2 G/50ML
2 SOLUTION INTRAVENOUS ONCE
Status: COMPLETED | OUTPATIENT
Start: 2025-06-19 | End: 2025-06-19

## 2025-06-19 RX ORDER — OXYCODONE HYDROCHLORIDE 5 MG/1
5 TABLET ORAL EVERY 6 HOURS PRN
Qty: 15 TABLET | Refills: 0 | Status: SHIPPED | OUTPATIENT
Start: 2025-06-19

## 2025-06-19 RX ORDER — PROPOFOL 10 MG/ML
INJECTION, EMULSION INTRAVENOUS AS NEEDED
Status: DISCONTINUED | OUTPATIENT
Start: 2025-06-19 | End: 2025-06-19

## 2025-06-19 RX ORDER — ACETAMINOPHEN 325 MG/1
650 TABLET ORAL EVERY 4 HOURS PRN
Status: DISCONTINUED | OUTPATIENT
Start: 2025-06-19 | End: 2025-06-19 | Stop reason: HOSPADM

## 2025-06-19 RX ORDER — DIPHENHYDRAMINE HYDROCHLORIDE 50 MG/ML
12.5 INJECTION, SOLUTION INTRAMUSCULAR; INTRAVENOUS ONCE AS NEEDED
Status: DISCONTINUED | OUTPATIENT
Start: 2025-06-19 | End: 2025-06-19 | Stop reason: HOSPADM

## 2025-06-19 RX ORDER — ROCURONIUM BROMIDE 10 MG/ML
INJECTION, SOLUTION INTRAVENOUS AS NEEDED
Status: DISCONTINUED | OUTPATIENT
Start: 2025-06-19 | End: 2025-06-19

## 2025-06-19 RX ORDER — DEXAMETHASONE SODIUM PHOSPHATE 10 MG/ML
6 INJECTION INTRAMUSCULAR; INTRAVENOUS ONCE
Status: DISCONTINUED | OUTPATIENT
Start: 2025-06-19 | End: 2025-06-19 | Stop reason: HOSPADM

## 2025-06-19 RX ORDER — ONDANSETRON HYDROCHLORIDE 2 MG/ML
4 INJECTION, SOLUTION INTRAVENOUS ONCE AS NEEDED
Status: DISCONTINUED | OUTPATIENT
Start: 2025-06-19 | End: 2025-06-19 | Stop reason: HOSPADM

## 2025-06-19 RX ORDER — KETOROLAC TROMETHAMINE 30 MG/ML
30 INJECTION, SOLUTION INTRAMUSCULAR; INTRAVENOUS ONCE AS NEEDED
Status: DISCONTINUED | OUTPATIENT
Start: 2025-06-19 | End: 2025-06-19 | Stop reason: HOSPADM

## 2025-06-19 RX ORDER — MEPERIDINE HYDROCHLORIDE 50 MG/ML
12.5 INJECTION INTRAMUSCULAR; INTRAVENOUS; SUBCUTANEOUS EVERY 10 MIN PRN
Status: DISCONTINUED | OUTPATIENT
Start: 2025-06-19 | End: 2025-06-19 | Stop reason: HOSPADM

## 2025-06-19 RX ORDER — PROCHLORPERAZINE EDISYLATE 5 MG/ML
5 INJECTION INTRAMUSCULAR; INTRAVENOUS ONCE AS NEEDED
Status: DISCONTINUED | OUTPATIENT
Start: 2025-06-19 | End: 2025-06-19 | Stop reason: HOSPADM

## 2025-06-19 RX ORDER — ALBUTEROL SULFATE 0.83 MG/ML
2.5 SOLUTION RESPIRATORY (INHALATION) ONCE AS NEEDED
Status: DISCONTINUED | OUTPATIENT
Start: 2025-06-19 | End: 2025-06-19 | Stop reason: HOSPADM

## 2025-06-19 RX ORDER — ACETAMINOPHEN 325 MG/1
975 TABLET ORAL ONCE
Status: COMPLETED | OUTPATIENT
Start: 2025-06-19 | End: 2025-06-19

## 2025-06-19 RX ORDER — LIDOCAINE HYDROCHLORIDE 10 MG/ML
0.1 INJECTION, SOLUTION INFILTRATION; PERINEURAL ONCE
Status: DISCONTINUED | OUTPATIENT
Start: 2025-06-19 | End: 2025-06-19 | Stop reason: HOSPADM

## 2025-06-19 RX ADMIN — ACETAMINOPHEN 975 MG: 325 TABLET ORAL at 08:20

## 2025-06-19 RX ADMIN — HYDROMORPHONE HYDROCHLORIDE 0.5 MG: 1 INJECTION, SOLUTION INTRAMUSCULAR; INTRAVENOUS; SUBCUTANEOUS at 11:49

## 2025-06-19 RX ADMIN — FENTANYL CITRATE 50 MCG: 50 INJECTION, SOLUTION INTRAMUSCULAR; INTRAVENOUS at 09:54

## 2025-06-19 RX ADMIN — PROPOFOL 50 MCG/KG/MIN: 10 INJECTION, EMULSION INTRAVENOUS at 09:50

## 2025-06-19 RX ADMIN — Medication 4 MCG: at 10:29

## 2025-06-19 RX ADMIN — LIDOCAINE HYDROCHLORIDE 60 MG: 20 INJECTION, SOLUTION INFILTRATION; PERINEURAL at 09:40

## 2025-06-19 RX ADMIN — ONDANSETRON 4 MG: 2 INJECTION, SOLUTION INTRAMUSCULAR; INTRAVENOUS at 09:54

## 2025-06-19 RX ADMIN — FENTANYL CITRATE 50 MCG: 50 INJECTION, SOLUTION INTRAMUSCULAR; INTRAVENOUS at 09:40

## 2025-06-19 RX ADMIN — CELECOXIB 400 MG: 100 CAPSULE ORAL at 08:20

## 2025-06-19 RX ADMIN — DEXAMETHASONE SODIUM PHOSPHATE 8 MG: 4 INJECTION, SOLUTION INTRAMUSCULAR; INTRAVENOUS at 09:53

## 2025-06-19 RX ADMIN — PROPOFOL 250 MG: 10 INJECTION, EMULSION INTRAVENOUS at 09:40

## 2025-06-19 RX ADMIN — MEPERIDINE HYDROCHLORIDE 12.5 MG: 50 INJECTION, SOLUTION INTRAMUSCULAR; INTRAVENOUS; SUBCUTANEOUS at 12:02

## 2025-06-19 RX ADMIN — PROPOFOL 50 MG: 10 INJECTION, EMULSION INTRAVENOUS at 10:06

## 2025-06-19 RX ADMIN — Medication 4 MCG: at 10:42

## 2025-06-19 RX ADMIN — MIDAZOLAM 2 MG: 1 INJECTION INTRAMUSCULAR; INTRAVENOUS at 09:31

## 2025-06-19 RX ADMIN — HYDROMORPHONE HYDROCHLORIDE 0.5 MG: 1 INJECTION, SOLUTION INTRAMUSCULAR; INTRAVENOUS; SUBCUTANEOUS at 11:37

## 2025-06-19 RX ADMIN — PROPOFOL 50 MG: 10 INJECTION, EMULSION INTRAVENOUS at 09:51

## 2025-06-19 RX ADMIN — GENTAMICIN SULFATE 240 MG: 40 INJECTION, SOLUTION INTRAMUSCULAR; INTRAVENOUS at 09:51

## 2025-06-19 RX ADMIN — CEFAZOLIN SODIUM 2 G: 2 SOLUTION INTRAVENOUS at 09:44

## 2025-06-19 RX ADMIN — Medication 4 MCG: at 10:11

## 2025-06-19 RX ADMIN — Medication 4 MCG: at 10:20

## 2025-06-19 RX ADMIN — ROCURONIUM BROMIDE 50 MG: 50 INJECTION, SOLUTION INTRAVENOUS at 09:40

## 2025-06-19 SDOH — HEALTH STABILITY: MENTAL HEALTH: CURRENT SMOKER: 0

## 2025-06-19 ASSESSMENT — PAIN - FUNCTIONAL ASSESSMENT
PAIN_FUNCTIONAL_ASSESSMENT: 0-10
PAIN_FUNCTIONAL_ASSESSMENT: UNABLE TO SELF-REPORT
PAIN_FUNCTIONAL_ASSESSMENT: 0-10

## 2025-06-19 ASSESSMENT — PAIN SCALES - GENERAL
PAINLEVEL_OUTOF10: 4
PAINLEVEL_OUTOF10: 7
PAIN_LEVEL: 0
PAINLEVEL_OUTOF10: 4
PAINLEVEL_OUTOF10: 0 - NO PAIN
PAINLEVEL_OUTOF10: 6
PAINLEVEL_OUTOF10: 7
PAINLEVEL_OUTOF10: 3

## 2025-06-19 NOTE — ANESTHESIA PREPROCEDURE EVALUATION
Patient: Damian Galdamez    Procedure Information       Date/Time: 06/19/25 0930    Procedure: PENILE PROSTHESIS INSERTION - Penile prosthesis    Location: [facility] 08 / [facility]    Surgeons: Bladimir Castillo MD            Relevant Problems   No relevant active problems       Clinical information reviewed:      Problems              NPO Detail:  No data recorded     Physical Exam    Airway  Mallampati: I  TM distance: >3 FB  Neck ROM: full  Mouth opening: 3 or more finger widths     Cardiovascular - normal exam  Rhythm: regular  Rate: normal     Dental - normal exam     Pulmonary - normal exam   Abdominal            Anesthesia Plan    History of general anesthesia?: yes  History of complications of general anesthesia?: no    ASA 2     general     The patient is not a current smoker.    intravenous induction   Postoperative pain plan includes opioids.  Trial extubation is planned.  Anesthetic plan and risks discussed with patient.  Use of blood products discussed with patient who.    Plan discussed with CRNA.

## 2025-06-19 NOTE — ANESTHESIA POSTPROCEDURE EVALUATION
Patient: Damian Galdamez    Procedure Summary       Date: 06/19/25 Room / Location: [facility] / [facility]    Anesthesia Start: 0929 Anesthesia Stop: 1120    Procedure: PENILE PROSTHESIS INSERTION Diagnosis:       Gender dysphoria      (Gender dysphoria [F64.9])    Surgeons: Bladimir Castillo MD Responsible Provider: Magdiel Azevedo MD    Anesthesia Type: general ASA Status: 2            Anesthesia Type: general    Vitals Value Taken Time   /53 06/19/25 11:20   Temp 36.0 06/19/25 11:26   Pulse 79 06/19/25 11:25   Resp 14 06/19/25 11:26   SpO2 96 % 06/19/25 11:25   Vitals shown include unfiled device data.    Anesthesia Post Evaluation    Patient location during evaluation: PACU  Patient participation: complete - patient cannot participate  Level of consciousness: sleepy but conscious  Pain score: 0  Pain management: adequate  Airway patency: patent  Cardiovascular status: acceptable  Respiratory status: acceptable  Hydration status: acceptable  Postoperative Nausea and Vomiting: none        There were no known notable events for this encounter.

## 2025-06-19 NOTE — OP NOTE
PENILE PROSTHESIS INSERTION, RIGHT TESTICULAR PROSTHESIS REMOVAL Operative Note     Date: 2025  OR Location: [facility]    Name: Damian Galdamez, : 2000, Age: 25 y.o., MRN: [id number], Sex: adult    Diagnosis  Pre-op Diagnosis      * Gender dysphoria [F64.9] Post-op Diagnosis     * Gender dysphoria [F64.9]     Procedures  PENILE PROSTHESIS INSERTION  81877 - [place] INSJ MULTI-COMPONENT INFLATABLE PENILE PROSTH    [place] ADJNT TIS TRNSFR/REARGMT ANY AREA 30.1-60 SQ CM [52432]  [place] SUB GRFT F/S/N/H/F/G/M/D <100SQ CM 1ST 25 SQ CM [06543]  Surgeons      * Bladimir Castillo - Primary    Resident/Fellow/Other Assistant:  Surgeons and Role:     * Ravindra Christina MD - Fellow    Staff:   Circulator: Tiffanie Burr Person: Kristi Booth Circulator: Claudette    Anesthesia Staff: Anesthesiologist: Magdiel Azevedo MD  CRNA: ADRIANO Melendez-CRNA    Procedure Summary  Anesthesia: General  ASA: II  Estimated Blood Loss: 10mL  Intra-op Medications:   Administrations occurring from 0930 to 1200 on 25:   Medication Name Total Dose   ceFAZolin (Ancef) 2 g in dextrose (iso) IV 50 mL 2 g   gentamicin (Garamycin) 240 mg in dextrose 5% 100 mL  mg   dexAMETHasone (Decadron) 4 mg/mL 8 mg   dexmedeTOMidine 4 mcg/mL in NS 5 mL syringe 16 mcg   fentaNYL (Sublimaze) injection 50 mcg/mL 100 mcg   lidocaine (Xylocaine) injection 2 % 60 mg   midazolam (Versed) injection 1 mg/mL 2 mg   ondansetron 2 mg/mL 4 mg   propofol (Diprivan) injection 10 mg/mL 636.35 mg   rocuronium (ZeMuron) 50 mg/5 mL injection 50 mg              Anesthesia Record               Intraprocedure I/O Totals          Intake    ceFAZolin (Ancef) 2 g in dextrose (iso) IV 50 mL 50.00 mL    gentamicin (Garamycin) 240 mg in dextrose 5% 100 mL .00 mL    Total Intake 156 mL          Specimen: No specimens collected              Drains and/or Catheters:   Closed/Suction Drain 1 Anterior;Right Hip Bulb 10 Fr. (Active)       Tourniquet Times:         Implants:  Implants        Type Name Action Serial No.      Implant RESERVOIR, CL, 75CC, TITAN - [id number] Implanted      Implant CYLINDER PUMP SET, TITAN ZERO DEGREE INFRAPUBIC, 20CM - [id number] Implanted       TRUE-LOCK PLUG Implanted      Implant GRAFT, VASCULAR, STRAIGHT, KNITTED, HEMASHIELD GOLD, 16 MM X 15 CM, DOUBLE VELOUR - [id number] Implanted               Findings: Grossly normal anatomy without evidence of infection; total length 19cm, Coloplasty Titan 20cm cylinder cut to 17cm; reservoir filled with 75cc injectable saline     Indications:Damian Galdamez is a 24 y.o. trans man s/p Stage 2 ALT without UL 4/4/24.  Glansplasty and testicular implant placement 12/19/24. Planned for IPP and replacement of right testicular implant with IPP pump.     The patient was seen in the preoperative area. The risks, benefits, complications, treatment options, non-operative alternatives, expected recovery and outcomes were discussed with the patient. The possibilities of reaction to medication, pulmonary aspiration, injury to surrounding structures, bleeding, recurrent infection, the need for additional procedures, failure to diagnose a condition, and creating a complication requiring transfusion or operation were discussed with the patient. The patient concurred with the proposed plan, giving informed consent.  The site of surgery was properly noted/marked if necessary per policy. The patient has been actively warmed in preoperative area. Preoperative antibiotics have been ordered and given within 1 hours of incision. Venous thrombosis prophylaxis have been ordered including bilateral sequential compression devices    Procedure Details: After informed consent was obtained, the patient was brought into the operating suite and placed in the supine position. Sequential compression stockings were applied to the bilateral lower extremities. Periprocedure antibiotics of Vancomycin 1g IV and Gentamicin 240mg IV were administered for prophylaxis.  General anesthesia was administered. The patient was placed in dorsal lithotomy position, prepped and draped in the usual sterile manner. Prior to arrival in the operating room, patient was instructed to void prior to the procedure.     Inflatable Penile Prosthetic Placement   We then made a horizontal incision over the pubic symphysis in the midline we dissected down to the pubic symphysis and cleared away the periosteum to create a space for the bone anchoring stitches. Three anchoring stitches (#2 Fiberwire) were placed in the midline.     Using Bovie electrocautery scissors, we then dissected a flap under the incision to the neophallus base, using the scissors to create a space for dilation towards the glans penis.  We then dilated to distal glans using sequential Gloria dilaators. At this point, the length of our expected implant was 19cm (6cm from bone to skin, 13cm length of phallus) using the Nasim measuring tool.  Therefore, a 20cm Coloplast TITAN implant with no rear tip extender was prepared on the back table. We cut the rear tip to size. Total length was 17 cm A piece of Dacron graft was secured around the rear tip of the implant using 2-0 silk sutures. A single cylinder was used.     We created the reservoir space by opening the fascia superior to the pubic bone, sweeping dissection to separate the transversalis fascia from the rectus muscle and entering the space of Retzius. We irrigated our incision with irricept irrigation. After changing gloves, we placed the reservoir in this space and instilled 75 ml of injectable saline. The reservoir was noted to be in good position with no extrusion from the space. The reservoir tubing was shodded and kept to the side.      We then used the Nasim tool and Danilo needle to place the penile prothesis into the incision and deliver it into the glans penis distally. The proximal end of the IPP was fixed to the stitches anchored to the periosteum, using fiberwire  sutures. Because of the different dissection in the neophallus as well as extensive modification of the implant, I will be using a modifier 22.       Attention was then turned to placing the scrotal pump and removal of the testicular implant. Through the same incision, we dissected down toward the right hemiscrotum and removed the testicular implant. The pump was delivered down into the dependent hemiscrotum. The cylinder was filled using a syringe and we noted good position of the IPP with adequate rigidity. The device was cycled and the cylinder confirmed to be in good position. The reservoir and device were then connected and the device left partially inflated. Adjacent tissue transfer to achieve non overlapping suture lines was done. Area was 5 cm X 3 cm = 15 cm2. A 10Fr round drain was placed into the incision and exiting through the right pubic region. The incision was irrigated and closed in two deep layers with 3-0 vicryl and the skin closed with a running baseball suture with 4-0 monocryl. The drain was connected to bulb suction. Dermabond was placed over the infrapubic incision and glans. Island dressings were placed over the infrapubic incision and drain site.     This concluded the procedure. All counts were correct. The patient was awakened from anesthesia and taken to the PACU in stable condition.     Evidence of Infection: No   Complications:  None; patient tolerated the procedure well.    Disposition: PACU - hemodynamically stable.  Condition: stable       Additional Details: Device inflated to 20%, drain in place. Follow up Monday 6/23    Attending Attestation: I was present and scrubbed for the entire procedure.    Bladimir Castillo  Phone Number: [phone]

## 2025-06-19 NOTE — H&P
"History Of Present Illness  Damian Galdamez is a 24 y.o. trans man s/p Stage 2 ALT without UL 4/4/24  Glansplasty and testicular implant placement 12/19/24     Feels like R implant deeper than L, R is too flat  Recently noticing spots of blood with wiping  No changes in stream  Doesn't feel any irritation  Asking about types of penile implants     Past Medical History  Medical History[1]    Surgical History  Surgical History[2]     Social History  He reports that he has never smoked. He has never used smokeless tobacco. He reports current alcohol use. He reports current drug use. Drug: Marijuana.    Family History  Family History[3]     Allergies  Patient has no known allergies.    Review of Systems     Physical Exam  Constitutional: Well-developed and well-nourished. No distress.    Head: Normocephalic and atraumatic.    Neck: Normal range of motion.    Pulmonary/Chest: Effort normal. No respiratory distress.   Integumentary: No rash or lesions.  GI: Nondistended.  : See below.  Musculoskeletal: Normal range of motion.    Neurological: Alert and oriented.  Psychiatric: Normal mood and affect. Thought content normal.       Last Recorded Vitals  Blood pressure 133/74, pulse 71, temperature 36.3 °C (97.3 °F), temperature source Temporal, resp. rate 16, height 1.727 m (5' 8\"), weight 83 kg (183 lb), SpO2 97%.    Relevant Results           Assessment & Plan  Gender dysphoria      Plan for IPP, removal of right testicular implant       Ravindra Christina MD         [1]   Past Medical History:  Diagnosis Date    Gender dysphoria 11/01/2023   [2] No past surgical history on file.  [3] No family history on file.    "

## 2025-06-19 NOTE — ANESTHESIA PROCEDURE NOTES
Airway  Date/Time: 6/19/2025 9:43 AM  Reason: elective    Airway not difficult    Staffing  Performed: CRNA   Authorized by: Magdiel Azevedo MD    Performed by: ADRIANO Melendez-STEVE  Patient location during procedure: OR    Patient Condition  Indications for airway management: anesthesia  Patient position: sniffing  MILS maintained throughout  No planned trial extubation  Sedation level: deep     Final Airway Details   Preoxygenated: yes  Final airway type: endotracheal airway  Successful airway: ETT  Cuffed: yes   Successful intubation technique: video laryngoscopy  Adjuncts used in placement: intubating stylet  Endotracheal tube insertion site: oral  Blade size: #4  ETT size (mm): 7.5  Cormack-Lehane Classification: grade I - full view of glottis  Placement verified by: capnometry   Measured from: teeth  ETT to teeth (cm): 20  Number of attempts at approach: 1    Additional Comments  Macgrath vl #4 x1

## 2025-06-19 NOTE — DISCHARGE INSTRUCTIONS
DEPARTMENT OF Urology  DISCHARGE INSTRUCTIONS PENILE PROSTHESIS  Outpatient Surgery    C O N F I D E N T I A L   I N F O R M A T I O N    Call Urology Department [phone] during regular daytime business hours (8:00 am - 5:00 pm) and after 5:00 pm ask for the Urology resident with any questions or concerns.      If it is a life-threatening situation, proceed to the nearest emergency department.        Follow-up appointment:  Monday 6/23     Thank you for the opportunity to care for you today.  Your health and healing are very important to us.  We hope we made you feel as comfortable as possible and are committed to your recovery and continued well-being.      The following is a brief overview of your penile prosthesis procedure. Some of the information contained on this summary may be confidential.  This information should be kept in your records and should be shared with your regular doctor.    Physicians:   Dr. Castillo     Procedure performed: insertion of penile prosthesis    What to Expect During your Recovery and Home Care  Anesthesia Side Effects   You received general anesthesia today.  You may feel sleepy, tired, or have a sore throat.   You may also feel drowsiness, dizziness, or inability to think clearly.  For your safety, do not drive, drink alcoholic beverages, take any unprescribed medication or make any important decisions for 24 hours.  A responsible adult should be with you for 24 hours.       Activity and Recovery    Go home and rest. No strenuous activity and no heavy lifting over 10 lbs.     Pain Control  Unfortunately, you may experience pain after your procedure.  Adequate management can include alternative measures to help ease your pain and can include ice packs, scrotal support, and over the counter Tylenol. Oxycodone may also be prescribe for breakthrough pain. Do not take more than 4,000 mg of Tylenol in a 24-hour period.     Oxycodone is a narcotic medication, which can impair judgment,  slow reaction times, and cause constipation. Take Miralax, Colace, or other stool softeners for constipation. Do not drive or operate machinery while taking narcotic medications.    Nausea/Vomiting   Clear liquids are best tolerated at first. Start slow, advance your diet as tolerated to normal foods. Avoid spicy, greasy, heavy foods at first. Also, you may feel nauseous or like you need to vomit if you take any type of medication on an empty stomach.  Call your physician if you are unable to eat or drink and have persistent vomiting.          Signs of Bleeding   Minor bleeding or drainage may occur from the surgical site; however, excessive or consistent bleeding should be reported to your surgeon. Excessive bleeding is defined as blood that is dripping from wound, soaking you bandages, and is ketchup colored, thick with possible blood clots.  Consistent is defined as bleeding that does not stop.    Treatment/wound care:   Keep area(s) clean and dry. You can wear gauze dressing on top of the incisions, so the scrotal support does not irritate the incisions.   If you have a drain in place, we will schedule an appointment for the drain to be removed.  The device is purposefully left partially inflated (approximately 30 to 40 percent). This will be deflated at the two week visit.  Pull the scrotal pump straight down towards the ground in the shower when the scrotal skin is supple. You should also do this every time you go to the bathroom This will allow the pump to heal in a nice dependent position.  It is okay to shower 48 hours after time of surgery.  Do not submerge incisions in water (tub bath, swimming) until incisions have completely healed.  Do not scrub wound(s), pat dry.  Clean with mild soap. Do not use oils or lotions on incisions.    Please visually inspect your wound(s) at least once daily.  If the wound(s) are in a difficult to see location, please use a mirror or have someone else assist with visual  inspection.    Signs of Infection  Signs of infection can include fever, excessive swelling, heat, drainage, redness, or severe pain.  If you see any of these occur, please contact [phone]. Any fever higher than 100.4, especially if associated with an ill feeling, abdominal pain, chills, or nausea should be reported to your surgeon.        Additional Instructions:   No sex until discussed further at your follow up appointment. Your device has been left partially inflated, do not deflate, or inflate further. You should wear tight fitting underwear for the next six weeks. At your six week follow up you will be taught how to use your device.

## 2025-06-23 ENCOUNTER — APPOINTMENT (OUTPATIENT)
Age: 25
End: 2025-06-23
Payer: COMMERCIAL

## 2025-06-23 VITALS
DIASTOLIC BLOOD PRESSURE: 77 MMHG | WEIGHT: 184.4 LBS | BODY MASS INDEX: 27.95 KG/M2 | HEART RATE: 82 BPM | HEIGHT: 68 IN | SYSTOLIC BLOOD PRESSURE: 122 MMHG

## 2025-06-23 DIAGNOSIS — F64.9 GENDER DYSPHORIA: Primary | ICD-10-CM

## 2025-06-23 DIAGNOSIS — Z96.0 S/P INSERTION OF PENILE IMPLANT: ICD-10-CM

## 2025-06-23 PROCEDURE — 3008F BODY MASS INDEX DOCD: CPT | Performed by: NURSE PRACTITIONER

## 2025-06-23 PROCEDURE — 99024 POSTOP FOLLOW-UP VISIT: CPT | Performed by: NURSE PRACTITIONER

## 2025-06-23 NOTE — PROGRESS NOTES
GENDER CARE POST-OP     HISTORY OF PRESENT ILLNESS:   Damian Galdamez is a 25 y.o. trans man s/p Stage 2 ALT without UL 4/4/24  Glansplasty and testicular implant placement 12/19/24  Stage 3 with IPP placement 6/19/25    INTERVAL HISTORY:  Returns today for POV  Seen unaccompanied  A little sore  Otherwise doing well  No fevers or chills    PAST MEDICAL HISTORY:  Past Medical History:   Diagnosis Date    Gender dysphoria 11/01/2023       PAST SURGICAL HISTORY:  No past surgical history on file.     ALLERGIES:   No Known Allergies     MEDICATIONS:     Current Outpatient Medications:     methylphenidate ER (Concerta) 18 mg extended release tablet, Take 1 tablet (18 mg) by mouth once daily in the morning. Do not crush, chew, or split., Disp: , Rfl:     oxyCODONE (Roxicodone) 5 mg immediate release tablet, Take 1 tablet (5 mg) by mouth every 6 hours if needed for severe pain (7 - 10)., Disp: 15 tablet, Rfl: 0    sulfamethoxazole-trimethoprim (Bactrim DS) 800-160 mg tablet, Take 1 tablet by mouth 2 times a day for 7 days., Disp: 14 tablet, Rfl: 0    testosterone cypionate (Depo-Testosterone) 100 mg/mL injection, Inject 0.6 mL (60 mg) into the muscle 1 (one) time per week. Last dose 4/3/24, Disp: , Rfl:      SOCIAL HISTORY:  Patient  reports that he has never smoked. He has never used smokeless tobacco. He reports current alcohol use. He reports current drug use. Drug: Marijuana.   Social History     Socioeconomic History    Marital status: Significant Other     Spouse name: Not on file    Number of children: Not on file    Years of education: Not on file    Highest education level: Not on file   Occupational History    Not on file   Tobacco Use    Smoking status: Never    Smokeless tobacco: Never   Substance and Sexual Activity    Alcohol use: Yes     Comment: 1 drink every 2 weeks    Drug use: Yes     Types: Marijuana     Comment: medical reasons    Sexual activity: Defer   Other Topics Concern    Not on file   Social  History Narrative    Not on file     Social Drivers of Health     Financial Resource Strain: Low Risk  (4/4/2024)    Overall Financial Resource Strain (CARDIA)     Difficulty of Paying Living Expenses: Not hard at all   Food Insecurity: Not on file   Transportation Needs: No Transportation Needs (4/4/2024)    PRAPARE - Transportation     Lack of Transportation (Medical): No     Lack of Transportation (Non-Medical): No   Physical Activity: Not on file   Stress: Not on file   Social Connections: Not on file   Intimate Partner Violence: Not on file   Housing Stability: Low Risk  (4/4/2024)    Housing Stability Vital Sign     Unable to Pay for Housing in the Last Year: No     Number of Places Lived in the Last Year: 1     Unstable Housing in the Last Year: No       FAMILY HISTORY:  No family history on file.    REVIEW OF SYSTEMS:  All systems reviewed, pertinent negatives as noted in HPI.    PHYSICAL EXAM:  Visit Vitals  /77   Pulse 82     Constitutional: Well-developed and well-nourished. No distress.    Head: Normocephalic and atraumatic.    Neck: Normal range of motion.    Pulmonary/Chest: Effort normal. No respiratory distress.   Integumentary: No rash or lesions.  GI: Nondistended.  : See below.  Musculoskeletal: Normal range of motion.    Neurological: Alert and oriented.  Psychiatric: Normal mood and affect. Thought content normal.          LABORATORY REVIEW:   Lab Results   Component Value Date    BUN 16 11/11/2024    CREATININE 0.90 11/11/2024    EGFR >90 11/11/2024     11/11/2024    K 4.1 11/11/2024     11/11/2024    CO2 30 11/11/2024    CALCIUM 9.3 11/11/2024      Lab Results   Component Value Date    WBC 5.2 11/11/2024    RBC 5.13 11/11/2024    HGB 15.0 11/11/2024    HCT 45.3 11/11/2024    MCV 88 11/11/2024    MCH 29.2 11/11/2024    MCHC 33.1 11/11/2024    RDW 13.2 11/11/2024     11/11/2024    MPV 9.8 11/01/2023          Assessment:     Encounter Diagnoses   Name Primary?    Gender  dysphoria Yes    S/P insertion of penile implant        Damian Galdamez is a 23 y.o. trans man s/p Stage 2 ALT without UL 4/4/24  Stage 2B glansplasty & testicular implants 12/19/24  Stage 3 with IPP placement 6/19/25    Phallus a little turgid, deflated slightly  Pump and testicular implant seated nicely in scrotum, mobile  CAMI with small amount of serosanguineous drainage, removed without difficulty  Mons incision without erythema or drainage, glue intact     Plan:   Discussed local wound care  RTC in 4-6 weeks to begin cycling device  Encouraged to call in the interim with any questions, concerns

## 2025-06-24 ENCOUNTER — APPOINTMENT (OUTPATIENT)
Dept: UROLOGY | Facility: CLINIC | Age: 25
End: 2025-06-24
Payer: COMMERCIAL

## 2025-07-17 ENCOUNTER — APPOINTMENT (OUTPATIENT)
Dept: UROLOGY | Facility: CLINIC | Age: 25
End: 2025-07-17
Payer: COMMERCIAL

## 2025-07-22 ENCOUNTER — APPOINTMENT (OUTPATIENT)
Age: 25
End: 2025-07-22
Payer: COMMERCIAL

## 2025-07-22 VITALS
WEIGHT: 185.4 LBS | SYSTOLIC BLOOD PRESSURE: 115 MMHG | DIASTOLIC BLOOD PRESSURE: 66 MMHG | HEART RATE: 64 BPM | BODY MASS INDEX: 28.1 KG/M2 | HEIGHT: 68 IN

## 2025-07-22 DIAGNOSIS — F64.9 GENDER DYSPHORIA: Primary | ICD-10-CM

## 2025-07-22 DIAGNOSIS — Z96.0 S/P INSERTION OF PENILE IMPLANT: ICD-10-CM

## 2025-07-22 PROCEDURE — 99024 POSTOP FOLLOW-UP VISIT: CPT | Performed by: NURSE PRACTITIONER

## 2025-07-22 PROCEDURE — 3008F BODY MASS INDEX DOCD: CPT | Performed by: NURSE PRACTITIONER

## 2025-07-22 NOTE — PROGRESS NOTES
GENDER CARE POST-OP     HISTORY OF PRESENT ILLNESS:   Damian Galdamez is a 25 y.o. trans man s/p Stage 2 ALT without UL 4/4/24  Glansplasty and testicular implant placement 12/19/24  Stage 3 with IPP placement 6/19/25    INTERVAL HISTORY:  Returns today for POV  Seen unaccompanied  Reports doing well  Testicular implant rode up a little  Asking about new techniques for glansplasty  Wants to hold off on any surgery for at least a year or two  He and wife are considering starting a family next summer    PAST MEDICAL HISTORY:  Past Medical History:   Diagnosis Date    Gender dysphoria 11/01/2023       PAST SURGICAL HISTORY:  Past Surgical History:   Procedure Laterality Date    HYSTERECTOMY  2023    OOPHORECTOMY  2023    left ovary remains        ALLERGIES:   No Known Allergies     MEDICATIONS:     Current Outpatient Medications:     methylphenidate ER (Concerta) 18 mg extended release tablet, Take 1 tablet (18 mg) by mouth once daily in the morning. Do not crush, chew, or split., Disp: , Rfl:     oxyCODONE (Roxicodone) 5 mg immediate release tablet, Take 1 tablet (5 mg) by mouth every 6 hours if needed for severe pain (7 - 10)., Disp: 15 tablet, Rfl: 0    testosterone cypionate (Depo-Testosterone) 100 mg/mL injection, Inject 0.6 mL (60 mg) into the muscle 1 (one) time per week. Last dose 4/3/24, Disp: , Rfl:      SOCIAL HISTORY:  Patient  reports that he has never smoked. He has never used smokeless tobacco. He reports current alcohol use of about 2.0 standard drinks of alcohol per week. He reports that he does not currently use drugs after having used the following drugs: Marijuana.   Social History     Socioeconomic History    Marital status: Significant Other     Spouse name: Not on file    Number of children: Not on file    Years of education: Not on file    Highest education level: Not on file   Occupational History    Not on file   Tobacco Use    Smoking status: Never    Smokeless tobacco: Never   Substance and  Sexual Activity    Alcohol use: Yes     Alcohol/week: 2.0 standard drinks of alcohol     Types: 2 Standard drinks or equivalent per week     Comment: 1 drink every 2 weeks    Drug use: Not Currently     Types: Marijuana     Comment: medical reasons    Sexual activity: Not Currently     Partners: Female     Birth control/protection: Surgical, Female Sterilization, None   Other Topics Concern    Not on file   Social History Narrative    Not on file     Social Drivers of Health     Financial Resource Strain: Low Risk  (4/4/2024)    Overall Financial Resource Strain (CARDIA)     Difficulty of Paying Living Expenses: Not hard at all   Food Insecurity: Not on file   Transportation Needs: No Transportation Needs (4/4/2024)    PRAPARE - Transportation     Lack of Transportation (Medical): No     Lack of Transportation (Non-Medical): No   Physical Activity: Not on file   Stress: Not on file   Social Connections: Not on file   Intimate Partner Violence: Not on file   Housing Stability: Low Risk  (4/4/2024)    Housing Stability Vital Sign     Unable to Pay for Housing in the Last Year: No     Number of Places Lived in the Last Year: 1     Unstable Housing in the Last Year: No       FAMILY HISTORY:  Family History   Problem Relation Name Age of Onset    Cancer Maternal Grandmother Carol Hosler     Cancer Maternal Grandfather Lee Hosler     Heart disease Paternal Grandfather [facility]     Hypertension Mother [facility]     Alcohol abuse Paternal Grandfather [facility]     Depression Paternal Grandfather [facility]     Early natural death Paternal Grandfather [facility]     Diabetes [facility]     Depression Mother [facility]     Depression Father [facility]        REVIEW OF SYSTEMS:  All systems reviewed, pertinent negatives as noted in HPI.    PHYSICAL EXAM:  Visit Vitals  /66   Pulse 64     Constitutional: Well-developed and well-nourished. No distress.    Head: Normocephalic and atraumatic.     Neck: Normal range of motion.    Pulmonary/Chest: Effort normal. No respiratory distress.   Integumentary: No rash or lesions.  GI: Nondistended.  : See below.  Musculoskeletal: Normal range of motion.    Neurological: Alert and oriented.  Psychiatric: Normal mood and affect. Thought content normal.      LABORATORY REVIEW:   Lab Results   Component Value Date    BUN 16 11/11/2024    CREATININE 0.90 11/11/2024    EGFR >90 11/11/2024     11/11/2024    K 4.1 11/11/2024     11/11/2024    CO2 30 11/11/2024    CALCIUM 9.3 11/11/2024      Lab Results   Component Value Date    WBC 5.2 11/11/2024    RBC 5.13 11/11/2024    HGB 15.0 11/11/2024    HCT 45.3 11/11/2024    MCV 88 11/11/2024    MCH 29.2 11/11/2024    MCHC 33.1 11/11/2024    RDW 13.2 11/11/2024     11/11/2024    MPV 9.8 11/01/2023          Assessment:     Encounter Diagnoses   Name Primary?    Gender dysphoria Yes    S/P insertion of penile implant        Damian Galdamez is a 23 y.o. trans man s/p Stage 2 ALT without UL 4/4/24  Stage 2B glansplasty & testicular implants 12/19/24  Stage 3 with IPP placement 6/19/25    Incisions healing very well  Device and pump are well-seated  Moderate swelling in distal phallus  No appreciable demarcation of glans other than scar    IPP successfully cycled now     Plan:   Discussed daily cycling x 10 minutes daily for the next few weeks  Once comfortable, may use for sexual activity  RTC prn, encouraged to reach out with any questions, concerns

## (undated) DEVICE — Device

## (undated) DEVICE — DRAPE, SHEET, UTILITY, NON ABSORBENT, 18 X 26 IN, LF

## (undated) DEVICE — APPLICATOR, CHLORAPREP, W/ORANGE TINT, 26ML

## (undated) DEVICE — DRESSING, WOUND, MEPITEL, 4X8

## (undated) DEVICE — DRAIN, CHANNEL, ROUND, FULL FLUTE 19F

## (undated) DEVICE — CORD, CAUTERY, BIOPOLAR FORCEP, 12FT

## (undated) DEVICE — IRRIGATION SET, CYSTOSCOPY, REGULATING CLAMP, STRAIGHT, 81 IN

## (undated) DEVICE — ELECTROSURGICAL, CLEANER, LECTROBRASIVE

## (undated) DEVICE — SLEEVE, VASO PRESS, CALF GARMENT, MEDIUM, GREEN

## (undated) DEVICE — ELECTRODE, ELECTROSURGICAL, BALL TIP, LLETZ, LARGE, 5 MM X 13 CM, STAINLESS STEEL

## (undated) DEVICE — CATHETER TRAY, SURESTEP, 16FR, URINE METER W/STATLOCK

## (undated) DEVICE — ELECTRODE, ELECTROSURGICAL, BLADE, INSULATED, ENT/IMA, STERILE

## (undated) DEVICE — TIP, ELECTROSURGICAL, 4IN BLADE, MODIFIED, EXTENDED

## (undated) DEVICE — EVACUATOR, WOUND, SUCTION, CLOSED, JACKSON-PRATT, 100 CC, SILICONE

## (undated) DEVICE — CATHETER, SECURE DEVICE, URINARY, ADH

## (undated) DEVICE — CORD, FORCEP, BIPOLAR, DISP

## (undated) DEVICE — GOWN, ASTOUND, XL

## (undated) DEVICE — BANDAGE, GAUZE, CONFORMING, KERLIX, 6 PLY, 4.5 IN X 4.1 YD

## (undated) DEVICE — DRESSING, GAUZE, SPONGE, 12 PLY, CURITY, 4 X 4 IN, RIGID TRAY, STERILE

## (undated) DEVICE — STAPLER, SKIN PROXIMATE, 35 WIDE

## (undated) DEVICE — TUBESET, SONICVAC

## (undated) DEVICE — DRAIN, JP CHANNEL, 10 FR, FULL FLUTE

## (undated) DEVICE — RESERVOIR, DRAINAGE, WOUND, JACKSON-PRATT, 100 CC, SILICONE

## (undated) DEVICE — DRESSING, AQUACEL AG, HYDROFIBER W/SILVER, 3.5 X 6 IN

## (undated) DEVICE — DRESSING, ISLAND, TELFA, 4 X 5 IN

## (undated) DEVICE — SYRINGE, 20 CC, LUER LOCK

## (undated) DEVICE — CAUTERY, PENCIL, PUSH BUTTON, SMOKE EVAC, 70MM

## (undated) DEVICE — FLOSEAL, MATRIX, HEMOSTATIC, FULL STERILE PREP, 5ML

## (undated) DEVICE — BAG, DECANTER

## (undated) DEVICE — DRESSING, GAUZE, PETROLATUM, XEROFORM, 5 X 9 IN, STERILE

## (undated) DEVICE — SYRINGE, 60 CC, IRRIGATION, BULB, CONTRO-BULB, PAPER POUCH

## (undated) DEVICE — NEEDLE, HYPODERMIC, SAFETYGLIDE, SHIELDING, REGULAR WALL, REGULAR BEVEL, 22 G X 1.5 IN, BLACK HUB

## (undated) DEVICE — FORCEP, BIOPOLAR, ADSON, NON INSUL, 5IN 1.0MM

## (undated) DEVICE — COVER, TABLE, 54X90

## (undated) DEVICE — ELECTRODE, ELECTROSURGICAL, COATED NEEDLE, EDGE, STERILE

## (undated) DEVICE — WOUND SYSTEM, DEBRIDEMENT & CLEANING, O.R DUOPAK

## (undated) DEVICE — CUP, MEDICINE, GRADUATED, 2 OZ, PLASTIC, DISP, LF

## (undated) DEVICE — DRAPE, DERMATAC, 19.7 X 21CM

## (undated) DEVICE — TOWEL, OR, XRAY DETECT 2 PK, GREEN, 17X26, ST

## (undated) DEVICE — TISSEEL FIBRIN SEALANT, PRIMA, FROZEN, 4ML

## (undated) DEVICE — TUBING, CLEAR N-COND, 5MM X 10, LF

## (undated) DEVICE — ELECTRODE, ELECTROSURGICAL, GUARDED TIP

## (undated) DEVICE — FOAM, GRAM, SILVER, MEDIUM DRESSING, VAC

## (undated) DEVICE — WOUND VAC KIT, W/CANNISTER, 500ML, 5/PK

## (undated) DEVICE — DRESSING, QUICKCLOT, HEMOSTATIC, 5 X 5

## (undated) DEVICE — SPONGE GAUZE, XRAY SC+RFID, 8X4 16 PLY, STERILE

## (undated) DEVICE — RETRACTOR, CORDLESS, RADIALUX, LIGHTED

## (undated) DEVICE — SYRINGE, 10 CC, LUER LOCK

## (undated) DEVICE — SYSTEM KIT, INCISION, PREVENA PEEL AND PLACE, 13CM

## (undated) DEVICE — STAY SET, SURGICAL, 5MM SHARP HOOK, 8PK

## (undated) DEVICE — BOWL SET, SMALL, DELUXE, STERILE

## (undated) DEVICE — SYRINGE, 50 CC, LUER LOCK

## (undated) DEVICE — DRAPE, UNDERBUTTOCKS